# Patient Record
Sex: MALE | Race: WHITE | Employment: UNEMPLOYED | URBAN - NONMETROPOLITAN AREA
[De-identification: names, ages, dates, MRNs, and addresses within clinical notes are randomized per-mention and may not be internally consistent; named-entity substitution may affect disease eponyms.]

---

## 2020-11-24 ENCOUNTER — HOSPITAL ENCOUNTER (EMERGENCY)
Age: 70
Discharge: ANOTHER HOSPITAL | End: 2020-11-24
Attending: EMERGENCY MEDICINE
Payer: COMMERCIAL

## 2020-11-24 ENCOUNTER — APPOINTMENT (OUTPATIENT)
Dept: GENERAL RADIOLOGY | Age: 70
End: 2020-11-24
Payer: COMMERCIAL

## 2020-11-24 VITALS
DIASTOLIC BLOOD PRESSURE: 66 MMHG | HEART RATE: 83 BPM | SYSTOLIC BLOOD PRESSURE: 102 MMHG | WEIGHT: 170 LBS | RESPIRATION RATE: 18 BRPM | TEMPERATURE: 97.7 F | OXYGEN SATURATION: 94 %

## 2020-11-24 LAB
ABO: NORMAL
ALBUMIN SERPL-MCNC: 3.6 G/DL (ref 3.5–5.1)
ALP BLD-CCNC: 51 U/L (ref 38–126)
ALT SERPL-CCNC: 20 U/L (ref 11–66)
ANION GAP SERPL CALCULATED.3IONS-SCNC: 20 MEQ/L (ref 8–16)
ANTIBODY SCREEN: NORMAL
AST SERPL-CCNC: 28 U/L (ref 5–40)
BASOPHILS # BLD: 0.2 %
BASOPHILS ABSOLUTE: 0 THOU/MM3 (ref 0–0.1)
BILIRUB SERPL-MCNC: 0.5 MG/DL (ref 0.3–1.2)
BUN BLDV-MCNC: 120 MG/DL (ref 7–22)
C-REACTIVE PROTEIN: 4.22 MG/DL (ref 0–1)
CALCIUM SERPL-MCNC: 8.9 MG/DL (ref 8.5–10.5)
CHLORIDE BLD-SCNC: 102 MEQ/L (ref 98–111)
CO2: 20 MEQ/L (ref 23–33)
CREAT SERPL-MCNC: 4.9 MG/DL (ref 0.4–1.2)
D-DIMER QUANTITATIVE: 784 NG/ML FEU (ref 0–500)
EKG ATRIAL RATE: 76 BPM
EKG P AXIS: -19 DEGREES
EKG P-R INTERVAL: 134 MS
EKG Q-T INTERVAL: 376 MS
EKG QRS DURATION: 78 MS
EKG QTC CALCULATION (BAZETT): 423 MS
EKG R AXIS: -9 DEGREES
EKG T AXIS: 5 DEGREES
EKG VENTRICULAR RATE: 76 BPM
EOSINOPHIL # BLD: 1.3 %
EOSINOPHILS ABSOLUTE: 0.1 THOU/MM3 (ref 0–0.4)
ERYTHROCYTE [DISTWIDTH] IN BLOOD BY AUTOMATED COUNT: 12.6 % (ref 11.5–14.5)
ERYTHROCYTE [DISTWIDTH] IN BLOOD BY AUTOMATED COUNT: 45.5 FL (ref 35–45)
FERRITIN: 936 NG/ML (ref 22–322)
GFR SERPL CREATININE-BSD FRML MDRD: 12 ML/MIN/1.73M2
GLUCOSE BLD-MCNC: 135 MG/DL (ref 70–108)
HCT VFR BLD CALC: 40.3 % (ref 42–52)
HEMOGLOBIN: 13.2 GM/DL (ref 14–18)
IMMATURE GRANS (ABS): 0.02 THOU/MM3 (ref 0–0.07)
IMMATURE GRANULOCYTES: 0.4 %
LACTIC ACID: 1.2 MMOL/L (ref 0.5–2.2)
LD: 230 U/L (ref 100–190)
LYMPHOCYTES # BLD: 25.5 %
LYMPHOCYTES ABSOLUTE: 1.3 THOU/MM3 (ref 1–4.8)
MCH RBC QN AUTO: 32.2 PG (ref 26–33)
MCHC RBC AUTO-ENTMCNC: 32.8 GM/DL (ref 32.2–35.5)
MCV RBC AUTO: 98.3 FL (ref 80–94)
MONOCYTES # BLD: 4.6 %
MONOCYTES ABSOLUTE: 0.2 THOU/MM3 (ref 0.4–1.3)
NUCLEATED RED BLOOD CELLS: 0 /100 WBC
OSMOLALITY CALCULATION: 323.5 MOSMOL/KG (ref 275–300)
PLATELET # BLD: 182 THOU/MM3 (ref 130–400)
PLATELET ESTIMATE: ADEQUATE
PMV BLD AUTO: 11.2 FL (ref 9.4–12.4)
POTASSIUM REFLEX MAGNESIUM: 4 MEQ/L (ref 3.5–5.2)
PROCALCITONIN: 0.47 NG/ML (ref 0.01–0.09)
RBC # BLD: 4.1 MILL/MM3 (ref 4.7–6.1)
RH FACTOR: NORMAL
SARS-COV-2, NAAT: DETECTED
SCAN OF BLOOD SMEAR: NORMAL
SEG NEUTROPHILS: 68 %
SEGMENTED NEUTROPHILS ABSOLUTE COUNT: 3.5 THOU/MM3 (ref 1.8–7.7)
SODIUM BLD-SCNC: 142 MEQ/L (ref 135–145)
TOTAL PROTEIN: 7.2 G/DL (ref 6.1–8)
TROPONIN T: < 0.01 NG/ML
WBC # BLD: 5.2 THOU/MM3 (ref 4.8–10.8)

## 2020-11-24 PROCEDURE — 85379 FIBRIN DEGRADATION QUANT: CPT

## 2020-11-24 PROCEDURE — 80053 COMPREHEN METABOLIC PANEL: CPT

## 2020-11-24 PROCEDURE — 83615 LACTATE (LD) (LDH) ENZYME: CPT

## 2020-11-24 PROCEDURE — 86140 C-REACTIVE PROTEIN: CPT

## 2020-11-24 PROCEDURE — 71045 X-RAY EXAM CHEST 1 VIEW: CPT

## 2020-11-24 PROCEDURE — 99285 EMERGENCY DEPT VISIT HI MDM: CPT

## 2020-11-24 PROCEDURE — 36415 COLL VENOUS BLD VENIPUNCTURE: CPT

## 2020-11-24 PROCEDURE — 6360000002 HC RX W HCPCS: Performed by: EMERGENCY MEDICINE

## 2020-11-24 PROCEDURE — 85025 COMPLETE CBC W/AUTO DIFF WBC: CPT

## 2020-11-24 PROCEDURE — 83605 ASSAY OF LACTIC ACID: CPT

## 2020-11-24 PROCEDURE — 84145 PROCALCITONIN (PCT): CPT

## 2020-11-24 PROCEDURE — 86900 BLOOD TYPING SEROLOGIC ABO: CPT

## 2020-11-24 PROCEDURE — 93005 ELECTROCARDIOGRAM TRACING: CPT | Performed by: EMERGENCY MEDICINE

## 2020-11-24 PROCEDURE — 84484 ASSAY OF TROPONIN QUANT: CPT

## 2020-11-24 PROCEDURE — U0002 COVID-19 LAB TEST NON-CDC: HCPCS

## 2020-11-24 PROCEDURE — 2580000003 HC RX 258: Performed by: EMERGENCY MEDICINE

## 2020-11-24 PROCEDURE — 86850 RBC ANTIBODY SCREEN: CPT

## 2020-11-24 PROCEDURE — 86901 BLOOD TYPING SEROLOGIC RH(D): CPT

## 2020-11-24 PROCEDURE — 82728 ASSAY OF FERRITIN: CPT

## 2020-11-24 RX ORDER — OMEPRAZOLE 20 MG/1
20 CAPSULE, DELAYED RELEASE ORAL 2 TIMES DAILY
COMMUNITY

## 2020-11-24 RX ORDER — ATENOLOL 100 MG/1
100 TABLET ORAL DAILY
COMMUNITY

## 2020-11-24 RX ORDER — ASPIRIN 81 MG/1
81 TABLET ORAL DAILY
COMMUNITY

## 2020-11-24 RX ORDER — PRAVASTATIN SODIUM 80 MG/1
80 TABLET ORAL NIGHTLY
COMMUNITY

## 2020-11-24 RX ORDER — HYDROCHLOROTHIAZIDE 25 MG/1
25 TABLET ORAL DAILY
COMMUNITY

## 2020-11-24 RX ORDER — AMLODIPINE BESYLATE 10 MG/1
10 TABLET ORAL DAILY
COMMUNITY

## 2020-11-24 RX ORDER — FINASTERIDE 5 MG/1
5 TABLET, FILM COATED ORAL DAILY
COMMUNITY

## 2020-11-24 RX ORDER — LISINOPRIL 20 MG/1
20 TABLET ORAL 2 TIMES DAILY
COMMUNITY

## 2020-11-24 RX ORDER — SODIUM CHLORIDE 9 MG/ML
INJECTION, SOLUTION INTRAVENOUS CONTINUOUS
Status: DISCONTINUED | OUTPATIENT
Start: 2020-11-24 | End: 2020-11-24 | Stop reason: HOSPADM

## 2020-11-24 RX ORDER — AMITRIPTYLINE HYDROCHLORIDE 50 MG/1
50 TABLET, FILM COATED ORAL NIGHTLY
COMMUNITY

## 2020-11-24 RX ORDER — DEXAMETHASONE 4 MG/1
6 TABLET ORAL ONCE
Status: COMPLETED | OUTPATIENT
Start: 2020-11-24 | End: 2020-11-24

## 2020-11-24 RX ORDER — TERAZOSIN 2 MG/1
2 CAPSULE ORAL NIGHTLY
COMMUNITY

## 2020-11-24 RX ADMIN — DEXAMETHASONE 6 MG: 4 TABLET ORAL at 13:20

## 2020-11-24 RX ADMIN — SODIUM CHLORIDE: 9 INJECTION, SOLUTION INTRAVENOUS at 18:30

## 2020-11-24 ASSESSMENT — ENCOUNTER SYMPTOMS
SORE THROAT: 1
RHINORRHEA: 0
CONSTIPATION: 0
ABDOMINAL PAIN: 0
NAUSEA: 0
COUGH: 1
SHORTNESS OF BREATH: 0
DIARRHEA: 0
VOMITING: 0

## 2020-11-24 ASSESSMENT — PAIN DESCRIPTION - LOCATION
LOCATION: BACK;KNEE

## 2020-11-24 ASSESSMENT — PAIN DESCRIPTION - FREQUENCY
FREQUENCY: CONTINUOUS

## 2020-11-24 ASSESSMENT — PAIN DESCRIPTION - ORIENTATION
ORIENTATION: RIGHT;LEFT
ORIENTATION: RIGHT;LEFT
ORIENTATION: LEFT;RIGHT
ORIENTATION: RIGHT;LEFT

## 2020-11-24 ASSESSMENT — PAIN DESCRIPTION - DESCRIPTORS
DESCRIPTORS: ACHING

## 2020-11-24 ASSESSMENT — PAIN SCALES - GENERAL
PAINLEVEL_OUTOF10: 5

## 2020-11-24 ASSESSMENT — PAIN DESCRIPTION - PAIN TYPE
TYPE: CHRONIC PAIN

## 2020-11-24 NOTE — ED NOTES
In for hourly rounding. Pt resting on cot in position of comfort with eyes closed, easily awoken to voice. Pt remains A&Ox4, resps easy and unlabored. Verbal orders for 100ml/hr fluid administration from Dr. Pelon Amos. IV flushed and  shows no s/s of infection or infiltration. Pt pain remains unchanged at this time. Monitor remains in place. Assisted pt on use of urinal. Updated pt on POC. Will monitor.      Issa Fry RN  11/24/20 1640

## 2020-11-24 NOTE — ED PROVIDER NOTES
Jov[facility] Walsh [facility] COMPLAINT       Chief Complaint   Patient presents with    Shortness of Breath    Generalized Body Aches       Nurses Notes reviewed and I agree except as noted in the HPI. HISTORY OF PRESENT ILLNESS    Get Doe is a 69 y.o. male who presents emergency department for reported low O2 level. Patient reports that he was brought [facility] ED [facility]. He states that he has had a sore throat and did have a cough recently. He had some episodes of posttussive emesis but states that his cough is actually better now. He also had some mild headache. No fever, nausea, vomiting, diarrhea. He denies any known exposure to COVID-19. He did have a Covid test performed yesterday but does not yet have the results. Per prearrival notes from EMS, they were called to bring the patient to our facility because his oxygen level was low last night, he was requiring 5 L nasal cannula oxygen and sats were 95%. Patient also reportedly was hypotensive at the transferring facility however his exact vital signs were not provided to us in the emergency department. Currently, patient is resting in the room on room air and sats are 95 to 96%. He denies chest pain, abdominal pain, lower extremity pain or swelling. He does report some generalized malaise and weakness. He also reports some ongoing chronic pain in the knees and lower back consistent with his history of chronic pain due to arthritis; this is at baseline. No other initial complaints or concerns. REVIEW OF SYSTEMS     Review of Systems   Constitutional: Positive for fatigue. Negative for diaphoresis and fever. HENT: Positive for sore throat. Negative for congestion and rhinorrhea. Eyes: Negative for visual disturbance. Respiratory: Positive for cough. Negative for shortness of breath. Cardiovascular: Negative for chest pain, palpitations and leg swelling. Gastrointestinal: Negative for abdominal pain, constipation, diarrhea, nausea and vomiting. Endocrine: Negative for polyuria. Genitourinary: Negative for dysuria. Musculoskeletal: Positive for arthralgias. Negative for joint swelling. Skin: Negative for rash. Neurological: Positive for headaches. Negative for dizziness, seizures, syncope, speech difficulty, weakness and numbness. Hematological: Negative for adenopathy. Psychiatric/Behavioral: Negative for confusion and self-injury. All other systems reviewed and are negative. PAST MEDICAL HISTORY    has no past medical history on file. SURGICAL HISTORY      has no past surgical history on file. CURRENT MEDICATIONS       Previous Medications    AMITRIPTYLINE (ELAVIL) 50 MG TABLET    Take 50 mg by mouth nightly    AMLODIPINE (NORVASC) 10 MG TABLET    Take 10 mg by mouth daily    ASPIRIN 81 MG EC TABLET    Take 81 mg by mouth daily    ATENOLOL (TENORMIN) 100 MG TABLET    Take 100 mg by mouth daily    FINASTERIDE (PROSCAR) 5 MG TABLET    Take 5 mg by mouth daily    HYDROCHLOROTHIAZIDE (HYDRODIURIL) 25 MG TABLET    Take 25 mg by mouth daily    LISINOPRIL (PRINIVIL;ZESTRIL) 20 MG TABLET    Take 20 mg by mouth 2 times daily    OMEPRAZOLE (PRILOSEC) 20 MG DELAYED RELEASE CAPSULE    Take 20 mg by mouth 2 times daily 2BID    PRAVASTATIN (PRAVACHOL) 80 MG TABLET    Take 80 mg by mouth nightly    TERAZOSIN (HYTRIN) 2 MG CAPSULE    Take 2 mg by mouth nightly 4qHS       ALLERGIES     has No Known Allergies. FAMILY HISTORY     has no family status information on file. family history is not on file. SOCIAL HISTORY          PHYSICAL EXAM     INITIAL VITALS:  weight is 170 lb (77.1 kg). His oral temperature is 97.7 °F (36.5 °C). His blood pressure is 102/66 and his pulse is 83. His respiration is 18 and oxygen saturation is 94%. Constitutional: Well-nourished, no distress evident.   HEENT: Normocephalic, normal hearing, EOMI, speech clear.  Neck: Soft supple. Trachea midline. Heart: Regular rate and rhythm on cardiac monitor, no cyanosis, no JVD appreciated  Lungs: Respiratory effort normal; no retractions, no audible wheezing, no signs of air hunger  Abdomen: Nondistended; soft, nontender. Extremities: Well-perfused, movement normal as observed. Neuro: No focal deficits noted. Psych: Normal affect and behavior. DIFFERENTIAL DIAGNOSIS:   covid 19, pneumonia, dehydration, and others    DIAGNOSTIC RESULTS     EKG: All EKG's are interpreted by the Emergency Department Physician who either signs or Co-signsthis chart in the absence of a cardiologist.  EKG shows sinus rhythm at a rate of 76 bpm with occasional PVCs, LA interval 134 ms, QRS duration 78 ms,  ms. No ST elevation or depression, there is fine artifact in the baseline diffusely which can obscure or complicate the breathing. My interpretation. RADIOLOGY: non-plain film images(s) such as CT,Ultrasound and MRI are read by the radiologist.    XR CHEST PORTABLE   Final Result   No acute disease            **This report has been created using voice recognition software. It may contain minor errors which are inherent in voice recognition technology. **      Final report electronically signed by Dr. Caldwell Given on 11/24/2020 11:59 AM        [] Visualized and interpreted by me   [x] Radiologist's Wet Read Report Reviewed   [] Discussed withRadiologist.    LABS:   Labs Reviewed   CBC WITH AUTO DIFFERENTIAL - Abnormal; Notable for the following components:       Result Value    RBC 4.10 (*)     Hemoglobin 13.2 (*)     Hematocrit 40.3 (*)     MCV 98.3 (*)     RDW-SD 45.5 (*)     Monocytes Absolute 0.2 (*)     All other components within normal limits   COMPREHENSIVE METABOLIC PANEL W/ REFLEX TO MG FOR LOW K - Abnormal; Notable for the following components:    Glucose 135 (*)     CREATININE 4.9 (*)      (*)     CO2 20 (*)     All other components within normal limits D-DIMER, QUANTITATIVE - Abnormal; Notable for the following components:    D-Dimer, Quant 784.00 (*)     All other components within normal limits   FERRITIN - Abnormal; Notable for the following components:    Ferritin 936 (*)     All other components within normal limits   LACTATE DEHYDROGENASE - Abnormal; Notable for the following components:     (*)     All other components within normal limits   C-REACTIVE PROTEIN - Abnormal; Notable for the following components:    CRP 4.22 (*)     All other components within normal limits   PROCALCITONIN - Abnormal; Notable for the following components:    Procalcitonin 0.47 (*)     All other components within normal limits   COVID-19 - Abnormal; Notable for the following components:    SARS-CoV-2, NAAT DETECTED (*)     All other components within normal limits   ANION GAP - Abnormal; Notable for the following components:    Anion Gap 20.0 (*)     All other components within normal limits   GLOMERULAR FILTRATION RATE, ESTIMATED - Abnormal; Notable for the following components:    Est, Glom Filt Rate 12 (*)     All other components within normal limits   OSMOLALITY - Abnormal; Notable for the following components:    Osmolality Calc 323.5 (*)     All other components within normal limits   TROPONIN   LACTIC ACID, PLASMA   SCAN OF BLOOD SMEAR   D-DIMER, QUANTITATIVE   TYPE AND SCREEN       EMERGENCY DEPARTMENT COURSE:   Vitals:    Vitals:    11/24/20 1416 11/24/20 1531 11/24/20 1636 11/24/20 1752   BP: 96/61 (!) 103/58 102/68 102/66   Pulse: 72 74 79 83   Resp: 18 18 18 18   Temp:       TempSrc:       SpO2: 94% 92% 92% 94%   Weight:         No prior labs available for comparison, suspect elevation of BUN and creatinine is acute. Covid positive. I contacted [facility] transfer center at approximately 1 PM and spoke with staff there. They are going to call back about bed status in a couple of hours. Decadron 6mg ordered PO.     He was accepted for transfer to [facility] under Dr. Inga Gongora. Patient did urinate here in the emergency department per nursing staff, IV fluid hydration was continued. CRITICAL CARE:   None    CONSULTS:  [facility] transfer center    PROCEDURES:  None    FINAL IMPRESSION      1. COVID-19    2. ROSHAN (acute kidney injury) ([facility] [address]          DISPOSITION/PLAN   Transfer    PATIENT REFERRED TO:  No follow-up provider specified. DISCHARGE MEDICATIONS:  New Prescriptions    No medications on file       (Please note that portions of this note were completed with a voice recognition program.  Efforts were made to edit the dictations but occasionally words are mis-transcribed.)    Provider:  I personally performed the services described in the documentation, reviewed and edited the documentation which was dictated, and it accurately records my words and actions.     Reji Snider MD 11/24/20 7:07 PM                Reji Snider MD  11/24/20 8653

## 2020-11-24 NOTE — ED NOTES
In for hourly rounding. Pt resting on cot in position of comfort. Pt remains A&Ox4, resps easy and unlabored. IV infusion complete and shows no s/s of infection or infiltration. Pt pain remains unchanged at this time. Monitor remains in place. Updated pt on POC. Will monitor. [facility] guards remain at bedside.      Romie Monzon RN  11/24/20 9713

## 2020-11-24 NOTE — ED NOTES
Pt presents to ED via [address] [facility] from [facility] for c/o shortness of breath and possible COVID. [facility] reports pt began complaining of shortness of breath last night and placed pt on 2L O2 via nc. Upon arrival, pt is resting on cot in position of comfort. Pt is A&Ox4, resps easy and unlabored with LS clear and equal bilat. Pt 95-97% on RA. EKG completed upon arrival. IV established PTA, flushed, and infusing with no s/s of infection or infiltration. En route, pt was noted to be hypotensive. Monitor applied and VSS. Dr. Rayray Cali at bedside for assessment. Lab at bedside to draw more blood work and pt swabbed for rapid covid. Will monitor.      Gretel Lundborg, RN  11/24/20 9231

## 2020-11-24 NOTE — ACP (ADVANCE CARE PLANNING)
Advance Care Planning     Advance Care Planning Activator (Inpatient)  Conversation Note      Date of ACP Conversation: 11/24/2020    Conversation Conducted with: Patient with [address] Decision Maker: Court-Appointed Legal Guardian (name) Florencia Weaver    ACP Activator: Doreen Mccormick    \"Who would you like to name as your primary health care decision-maker? \"               Name: Florencia Weaver        Relationship: [employer]          Phone number: [phone]  \"Can this person be reached easily? \" No  \"Who would you like to name as your back-up decision maker? \"   Name: None        Relationship: None          Phone number: None  \"Can this person be reached easily? \" No      Care Preferences    Ventilation: \"If you were in your present state of health and suddenly became very ill and were unable to breathe on your own, what would your preference be about the use of a ventilator (breathing machine) if it were available to you? \"      Would the patient desire the use of ventilator (breathing machine)?: yes    \"If your health worsens and it becomes clear that your chance of recovery is unlikely, what would your preference be about the use of a ventilator (breathing machine) if it were available to you? \"     Would the patient desire the use of ventilator (breathing machine)?: Yes      Resuscitation  \"CPR works best to restart the heart when there is a sudden event, like a heart attack, in someone who is otherwise healthy. Unfortunately, CPR does not typically restart the heart for people who have serious health conditions or who are very sick. \"    \"In the event your heart stopped as a result of an underlying serious health condition, would you want attempts to be made to restart your heart (answer \"yes\" for attempt to resuscitate) or would you prefer a natural death (answer \"no\" for do not attempt to resuscitate)? \" yes    [] Yes   [x] No   Educated Patient / Decision Maker regarding differences between Advance Directives and portable DNR orders. Length of ACP Conversation in minutes:  15  Conversation Outcomes:  [] ACP discussion completed  [x] Existing advance directive reviewed with patient; no changes to patient's previously recorded wishes  [] New Advance Directive completed  [] Portable Do Not Rescitate prepared for Provider review and signature  [] POLST/POST/MOLST/MOST prepared for Provider review and signature      Follow-up plan:    [] Schedule follow-up conversation to continue planning  [] Referred individual to Provider for additional questions/concerns   [] Advised patient/agent/surrogate to review completed ACP document and update if needed with changes in condition, patient preferences or care setting    [] This note routed to one or more involved healthcare providers      Nicole Diamond is an inmate from the local snf. His determination of treatment was determined by the warden. When discussed he stated the patient is and will remain a Full Code at this point in time. No further conversation or follow-up is required.

## 2020-11-24 NOTE — ED NOTES
Bed: 020A  Expected date: 11/24/20  Expected time: 10:57 AM  Means of arrival: [facility]  Comments:     Freddie Ruiz RN  11/24/20 1926

## 2020-11-24 NOTE — ED NOTES
In for hourly rounding. Pt resting on cot in position of comfort with eyes closed, easily awoken to voice. Pt remains A&Ox4, resps easy and unlabored. IV shows no s/s of infection or infiltration. Pt chronic pain remains unchanged at this time. Monitor remains in place. Updated pt on POC. Will monitor.        Ilia Ricks RN  11/24/20 0035

## 2020-11-24 NOTE — ED NOTES
In for hourly rounding. Pt resting on cot in position of comfort. Pt remains A&Ox4, resps easy and unlabored. IV shows no s/s of infection or infiltration. Pt pain remains unchanged at this time. Monitor remains in place. Updated pt on POC. Will monitor.      Sarah Hernandez RN  11/24/20 9406

## 2020-11-25 PROCEDURE — 93010 ELECTROCARDIOGRAM REPORT: CPT | Performed by: INTERNAL MEDICINE

## 2022-09-30 ENCOUNTER — HOSPITAL ENCOUNTER (EMERGENCY)
Age: 72
Discharge: HOME | End: 2022-09-30
Attending: EMERGENCY MEDICINE
Payer: MEDICAID

## 2022-09-30 ENCOUNTER — APPOINTMENT (OUTPATIENT)
Dept: GENERAL RADIOLOGY | Age: 72
End: 2022-09-30
Payer: MEDICAID

## 2022-09-30 VITALS
SYSTOLIC BLOOD PRESSURE: 161 MMHG | HEIGHT: 63 IN | TEMPERATURE: 98.7 F | RESPIRATION RATE: 18 BRPM | DIASTOLIC BLOOD PRESSURE: 83 MMHG | WEIGHT: 179 LBS | HEART RATE: 62 BPM | BODY MASS INDEX: 31.71 KG/M2 | OXYGEN SATURATION: 96 %

## 2022-09-30 DIAGNOSIS — J18.9 PNEUMONIA OF LEFT LOWER LOBE DUE TO INFECTIOUS ORGANISM: Primary | ICD-10-CM

## 2022-09-30 LAB
ALBUMIN SERPL-MCNC: 4.1 G/DL (ref 3.5–5.2)
ALP BLD-CCNC: 55 U/L (ref 40–129)
ALT SERPL-CCNC: 20 U/L (ref 0–40)
ANION GAP SERPL CALCULATED.3IONS-SCNC: 12 MMOL/L (ref 7–16)
APTT: 27.4 SEC (ref 24.5–35.1)
AST SERPL-CCNC: 18 U/L (ref 0–39)
BACTERIA: ABNORMAL /HPF
BASOPHILS ABSOLUTE: 0.04 E9/L (ref 0–0.2)
BASOPHILS RELATIVE PERCENT: 0.6 % (ref 0–2)
BILIRUB SERPL-MCNC: 0.2 MG/DL (ref 0–1.2)
BILIRUBIN URINE: NEGATIVE
BLOOD, URINE: NEGATIVE
BUN BLDV-MCNC: 33 MG/DL (ref 6–23)
CALCIUM SERPL-MCNC: 9.4 MG/DL (ref 8.6–10.2)
CHLORIDE BLD-SCNC: 108 MMOL/L (ref 98–107)
CLARITY: CLEAR
CO2: 24 MMOL/L (ref 22–29)
COLOR: YELLOW
CREAT SERPL-MCNC: 1.4 MG/DL (ref 0.7–1.2)
CRYSTALS, UA: ABNORMAL /HPF
EOSINOPHILS ABSOLUTE: 0.16 E9/L (ref 0.05–0.5)
EOSINOPHILS RELATIVE PERCENT: 2.3 % (ref 0–6)
EPITHELIAL CELLS, UA: ABNORMAL /HPF
GFR AFRICAN AMERICAN: >60
GFR NON-AFRICAN AMERICAN: 50 ML/MIN/1.73
GLUCOSE BLD-MCNC: 117 MG/DL (ref 74–99)
GLUCOSE URINE: NEGATIVE MG/DL
HCT VFR BLD CALC: 41 % (ref 37–54)
HEMOGLOBIN: 13.6 G/DL (ref 12.5–16.5)
IMMATURE GRANULOCYTES #: 0.02 E9/L
IMMATURE GRANULOCYTES %: 0.3 % (ref 0–5)
INR BLD: 1.1
KETONES, URINE: NEGATIVE MG/DL
LACTIC ACID: 1.6 MMOL/L (ref 0.5–2.2)
LEUKOCYTE ESTERASE, URINE: NEGATIVE
LYMPHOCYTES ABSOLUTE: 1.87 E9/L (ref 1.5–4)
LYMPHOCYTES RELATIVE PERCENT: 26.6 % (ref 20–42)
MCH RBC QN AUTO: 30.3 PG (ref 26–35)
MCHC RBC AUTO-ENTMCNC: 33.2 % (ref 32–34.5)
MCV RBC AUTO: 91.3 FL (ref 80–99.9)
MONOCYTES ABSOLUTE: 0.57 E9/L (ref 0.1–0.95)
MONOCYTES RELATIVE PERCENT: 8.1 % (ref 2–12)
NEUTROPHILS ABSOLUTE: 4.37 E9/L (ref 1.8–7.3)
NEUTROPHILS RELATIVE PERCENT: 62.1 % (ref 43–80)
NITRITE, URINE: NEGATIVE
PDW BLD-RTO: 13.6 FL (ref 11.5–15)
PH UA: 6 (ref 5–9)
PLATELET # BLD: 216 E9/L (ref 130–450)
PMV BLD AUTO: 10.9 FL (ref 7–12)
POTASSIUM REFLEX MAGNESIUM: 4 MMOL/L (ref 3.5–5)
PRO-BNP: 917 PG/ML (ref 0–125)
PROTEIN UA: NEGATIVE MG/DL
PROTHROMBIN TIME: 11.9 SEC (ref 9.3–12.4)
RBC # BLD: 4.49 E12/L (ref 3.8–5.8)
RBC UA: ABNORMAL /HPF (ref 0–2)
SARS-COV-2, NAAT: NOT DETECTED
SODIUM BLD-SCNC: 144 MMOL/L (ref 132–146)
SPECIFIC GRAVITY UA: >=1.03 (ref 1–1.03)
TOTAL PROTEIN: 7.5 G/DL (ref 6.4–8.3)
TROPONIN, HIGH SENSITIVITY: 11 NG/L (ref 0–11)
TROPONIN, HIGH SENSITIVITY: 12 NG/L (ref 0–11)
UROBILINOGEN, URINE: 0.2 E.U./DL
WBC # BLD: 7 E9/L (ref 4.5–11.5)
WBC UA: ABNORMAL /HPF (ref 0–5)

## 2022-09-30 PROCEDURE — 85025 COMPLETE CBC W/AUTO DIFF WBC: CPT

## 2022-09-30 PROCEDURE — 36415 COLL VENOUS BLD VENIPUNCTURE: CPT

## 2022-09-30 PROCEDURE — 87635 SARS-COV-2 COVID-19 AMP PRB: CPT

## 2022-09-30 PROCEDURE — 80053 COMPREHEN METABOLIC PANEL: CPT

## 2022-09-30 PROCEDURE — 85610 PROTHROMBIN TIME: CPT

## 2022-09-30 PROCEDURE — 71045 X-RAY EXAM CHEST 1 VIEW: CPT

## 2022-09-30 PROCEDURE — 99285 EMERGENCY DEPT VISIT HI MDM: CPT

## 2022-09-30 PROCEDURE — 81001 URINALYSIS AUTO W/SCOPE: CPT

## 2022-09-30 PROCEDURE — 93005 ELECTROCARDIOGRAM TRACING: CPT

## 2022-09-30 PROCEDURE — 84484 ASSAY OF TROPONIN QUANT: CPT

## 2022-09-30 PROCEDURE — 85730 THROMBOPLASTIN TIME PARTIAL: CPT

## 2022-09-30 PROCEDURE — 83605 ASSAY OF LACTIC ACID: CPT

## 2022-09-30 PROCEDURE — 6370000000 HC RX 637 (ALT 250 FOR IP): Performed by: EMERGENCY MEDICINE

## 2022-09-30 PROCEDURE — 83880 ASSAY OF NATRIURETIC PEPTIDE: CPT

## 2022-09-30 RX ORDER — CEFDINIR 300 MG/1
300 CAPSULE ORAL ONCE
Status: COMPLETED | OUTPATIENT
Start: 2022-09-30 | End: 2022-09-30

## 2022-09-30 RX ORDER — DOXYCYCLINE HYCLATE 100 MG/1
100 CAPSULE ORAL ONCE
Status: COMPLETED | OUTPATIENT
Start: 2022-09-30 | End: 2022-09-30

## 2022-09-30 RX ORDER — CEFDINIR 300 MG/1
300 CAPSULE ORAL 2 TIMES DAILY
Qty: 20 CAPSULE | Refills: 0 | Status: ON HOLD | OUTPATIENT
Start: 2022-09-30 | End: 2022-10-14 | Stop reason: HOSPADM

## 2022-09-30 RX ORDER — DOXYCYCLINE HYCLATE 100 MG
100 TABLET ORAL 2 TIMES DAILY
Qty: 20 TABLET | Refills: 0 | Status: ON HOLD | OUTPATIENT
Start: 2022-09-30 | End: 2022-10-14 | Stop reason: HOSPADM

## 2022-09-30 RX ADMIN — CEFDINIR 300 MG: 300 CAPSULE ORAL at 18:27

## 2022-09-30 RX ADMIN — DOXYCYCLINE 100 MG: 100 CAPSULE ORAL at 18:28

## 2022-09-30 ASSESSMENT — PAIN DESCRIPTION - LOCATION: LOCATION: ABDOMEN

## 2022-09-30 ASSESSMENT — PAIN SCALES - GENERAL: PAINLEVEL_OUTOF10: 5

## 2022-09-30 ASSESSMENT — PAIN - FUNCTIONAL ASSESSMENT
PAIN_FUNCTIONAL_ASSESSMENT: 0-10
PAIN_FUNCTIONAL_ASSESSMENT: NONE - DENIES PAIN

## 2022-09-30 ASSESSMENT — PAIN DESCRIPTION - ORIENTATION: ORIENTATION: LEFT

## 2022-09-30 NOTE — ED PROVIDER NOTES
HPI:  9/30/22,   Time: 3:18 PM EDT       Barbara Massey is a 66 y.o. male presenting to the ED for irr heart beat, beginning unk ago. The complaint has been persistent, mild in severity, and worsened by nothing. Pt states weak and fatigued for past week. In rehab facility. Chronic nausea from esophageal problems. No palpitations. No cp/sob/v/d/congestion/fever/chills/sweats. Bib ems, pos cough    Review of Systems:   Pertinent positives and negatives are stated within HPI, all other systems reviewed and are negative.          --------------------------------------------- PAST HISTORY ---------------------------------------------  Past Medical History:  has a past medical history of Arthritis and Prostate disorder. Past Surgical History:  has no past surgical history on file. Social History:  reports that he has never smoked. He has never used smokeless tobacco. He reports that he does not currently use alcohol. He reports that he does not use drugs. Family History: family history is not on file. The patients home medications have been reviewed. Allergies: Patient has no known allergies. ---------------------------------------------------PHYSICAL EXAM--------------------------------------    Constitutional/General: Alert and oriented x3, well appearing, non toxic in NAD  Head: Normocephalic and atraumatic  Eyes: PERRL, EOMI, conjunctive normal, sclera non icteric  Mouth: Oropharynx clear, handling secretions, no trismus, no asymmetry of the posterior oropharynx or uvular edema  Neck: Supple, full ROM,   Respiratory: Lungs clear to auscultation bilaterally, no wheezes, rales, or rhonchi. Not in respiratory distress  Cardiovascular:  Regular rate. Regular rhythm. Occ pvcsNo murmurs, gallops, or rubs. 2+ distal pulses  Chest: No chest wall tenderness  GI:  Abdomen Soft, Non tender, Non distended. +BS. No organomegaly, no palpable masses,  No rebound, guarding, or rigidity. Musculoskeletal: Moves all extremities x 4. Warm and well perfused, no clubbing, cyanosis, or edema. Capillary refill <3 seconds  Integument: skin warm and dry. No rashes. Lymphatic: no lymphadenopathy noted  Neurologic: GCS 15, no focal deficits, symmetric strength 5/5 in the upper and lower extremities bilaterally  Psychiatric: Normal Affect    -------------------------------------------------- RESULTS -------------------------------------------------  I have personally reviewed all laboratory and imaging results for this patient. Results are listed below.      LABS:  Results for orders placed or performed during the hospital encounter of 09/30/22   COVID-19, Rapid    Specimen: Nasopharyngeal Swab   Result Value Ref Range    SARS-CoV-2, NAAT Not Detected Not Detected   CBC with Auto Differential   Result Value Ref Range    WBC 7.0 4.5 - 11.5 E9/L    RBC 4.49 3.80 - 5.80 E12/L    Hemoglobin 13.6 12.5 - 16.5 g/dL    Hematocrit 41.0 37.0 - 54.0 %    MCV 91.3 80.0 - 99.9 fL    MCH 30.3 26.0 - 35.0 pg    MCHC 33.2 32.0 - 34.5 %    RDW 13.6 11.5 - 15.0 fL    Platelets 001 519 - 329 E9/L    MPV 10.9 7.0 - 12.0 fL    Neutrophils % 62.1 43.0 - 80.0 %    Immature Granulocytes % 0.3 0.0 - 5.0 %    Lymphocytes % 26.6 20.0 - 42.0 %    Monocytes % 8.1 2.0 - 12.0 %    Eosinophils % 2.3 0.0 - 6.0 %    Basophils % 0.6 0.0 - 2.0 %    Neutrophils Absolute 4.37 1.80 - 7.30 E9/L    Immature Granulocytes # 0.02 E9/L    Lymphocytes Absolute 1.87 1.50 - 4.00 E9/L    Monocytes Absolute 0.57 0.10 - 0.95 E9/L    Eosinophils Absolute 0.16 0.05 - 0.50 E9/L    Basophils Absolute 0.04 0.00 - 0.20 E9/L   Comprehensive Metabolic Panel w/ Reflex to MG   Result Value Ref Range    Sodium 144 132 - 146 mmol/L    Potassium reflex Magnesium 4.0 3.5 - 5.0 mmol/L    Chloride 108 (H) 98 - 107 mmol/L    CO2 24 22 - 29 mmol/L    Anion Gap 12 7 - 16 mmol/L    Glucose 117 (H) 74 - 99 mg/dL    BUN 33 (H) 6 - 23 mg/dL    Creatinine 1.4 (H) 0.7 - 1.2 mg/dL GFR Non-African American 50 >=60 mL/min/1.73    GFR African American >60     Calcium 9.4 8.6 - 10.2 mg/dL    Total Protein 7.5 6.4 - 8.3 g/dL    Albumin 4.1 3.5 - 5.2 g/dL    Total Bilirubin 0.2 0.0 - 1.2 mg/dL    Alkaline Phosphatase 55 40 - 129 U/L    ALT 20 0 - 40 U/L    AST 18 0 - 39 U/L   Troponin   Result Value Ref Range    Troponin, High Sensitivity 11 0 - 11 ng/L   Protime-INR   Result Value Ref Range    Protime 11.9 9.3 - 12.4 sec    INR 1.1    APTT   Result Value Ref Range    aPTT 27.4 24.5 - 35.1 sec   Brain Natriuretic Peptide   Result Value Ref Range    Pro- (H) 0 - 125 pg/mL   Urinalysis with Microscopic   Result Value Ref Range    Color, UA Yellow Straw/Yellow    Clarity, UA Clear Clear    Glucose, Ur Negative Negative mg/dL    Bilirubin Urine Negative Negative    Ketones, Urine Negative Negative mg/dL    Specific Gravity, UA >=1.030 1.005 - 1.030    Blood, Urine Negative Negative    pH, UA 6.0 5.0 - 9.0    Protein, UA Negative Negative mg/dL    Urobilinogen, Urine 0.2 <2.0 E.U./dL    Nitrite, Urine Negative Negative    Leukocyte Esterase, Urine Negative Negative    WBC, UA NONE 0 - 5 /HPF    RBC, UA NONE 0 - 2 /HPF    Epithelial Cells, UA RARE /HPF    Bacteria, UA RARE (A) None Seen /HPF    Crystals, UA Rare (A) None Seen /HPF   Lactic Acid   Result Value Ref Range    Lactic Acid 1.6 0.5 - 2.2 mmol/L   Troponin   Result Value Ref Range    Troponin, High Sensitivity 12 (H) 0 - 11 ng/L       RADIOLOGY:  Interpreted by Radiologist.  XR CHEST PORTABLE   Final Result   Mild left basilar atelectasis and or infiltrate. EKG:  This EKG is signed and interpreted by the EP. Time: 1516  Rate: 90  Rhythm: Sinus w/ frequent pvcs  Interpretation: non-specific EKG  Comparison: None      ------------------------- NURSING NOTES AND VITALS REVIEWED ---------------------------   The nursing notes within the ED encounter and vital signs as below have been reviewed by myself.   BP (!) 161/83   Pulse 62   Temp 98.7 °F (37.1 °C) (Oral)   Resp 18   Ht 5' 3\" (1.6 m)   Wt 179 lb (81.2 kg)   SpO2 96%   BMI 31.71 kg/m²   Oxygen Saturation Interpretation: Normal    The patients available past medical records and past encounters were reviewed. ------------------------------ ED COURSE/MEDICAL DECISION MAKING----------------------  Medications   cefdinir (OMNICEF) capsule 300 mg (300 mg Oral Given 9/30/22 1827)   doxycycline hyclate (VIBRAMYCIN) capsule 100 mg (100 mg Oral Given 9/30/22 1828)         ED COURSE:       Medical Decision Making:    Vss, no hypoxia, pt initially denied cough, then stated did. Pna on xr, feel pt safe for outpt abx      This patient's ED course included: a personal history and physicial examination    This patient has remained hemodynamically stable during their ED course. Re-Evaluations:             Re-evaluation. Patients symptoms show no change    Re-examination  9/30/22   6PM EDT          Vital Signs:         Counseling: The emergency provider has spoken with the patient and discussed todays results, in addition to providing specific details for the plan of care and counseling regarding the diagnosis and prognosis. Questions are answered at this time and they are agreeable with the plan.       --------------------------------- IMPRESSION AND DISPOSITION ---------------------------------    IMPRESSION  1. Pneumonia of left lower lobe due to infectious organism        DISPOSITION  Disposition: Discharge to nursing home  Patient condition is stable    NOTE: This report was transcribed using voice recognition software.  Every effort was made to ensure accuracy; however, inadvertent computerized transcription errors may be present        Charmaine Fournier MD  09/30/22 2054

## 2022-09-30 NOTE — ED NOTES
Called [phone] for facility on [facility]"" pt states he is staying at, was told supervisor will call back for pt transportation     Dominique [facility]aleyCro[facility]  09/30/22 3616

## 2022-10-04 LAB
EKG ATRIAL RATE: 87 BPM
EKG P AXIS: 35 DEGREES
EKG P-R INTERVAL: 132 MS
EKG Q-T INTERVAL: 388 MS
EKG QRS DURATION: 70 MS
EKG QTC CALCULATION (BAZETT): 466 MS
EKG R AXIS: -2 DEGREES
EKG T AXIS: 24 DEGREES
EKG VENTRICULAR RATE: 87 BPM

## 2022-10-06 ENCOUNTER — HOSPITAL ENCOUNTER (INPATIENT)
Age: 72
LOS: 8 days | Discharge: HOME | DRG: 254 | End: 2022-10-14
Attending: EMERGENCY MEDICINE | Admitting: FAMILY MEDICINE
Payer: MEDICAID

## 2022-10-06 ENCOUNTER — APPOINTMENT (OUTPATIENT)
Dept: GENERAL RADIOLOGY | Age: 72
DRG: 254 | End: 2022-10-06
Payer: MEDICAID

## 2022-10-06 ENCOUNTER — APPOINTMENT (OUTPATIENT)
Dept: CT IMAGING | Age: 72
DRG: 254 | End: 2022-10-06
Payer: MEDICAID

## 2022-10-06 DIAGNOSIS — J18.9 PNEUMONIA OF LEFT LOWER LOBE DUE TO INFECTIOUS ORGANISM: Primary | ICD-10-CM

## 2022-10-06 LAB
ALBUMIN SERPL-MCNC: 4.1 G/DL (ref 3.5–5.2)
ALP BLD-CCNC: 61 U/L (ref 40–129)
ALT SERPL-CCNC: 18 U/L (ref 0–40)
AMPHETAMINE SCREEN, URINE: NOT DETECTED
ANION GAP SERPL CALCULATED.3IONS-SCNC: 12 MMOL/L (ref 7–16)
AST SERPL-CCNC: 17 U/L (ref 0–39)
BACTERIA: ABNORMAL /HPF
BARBITURATE SCREEN URINE: NOT DETECTED
BASOPHILS ABSOLUTE: 0.04 E9/L (ref 0–0.2)
BASOPHILS RELATIVE PERCENT: 0.6 % (ref 0–2)
BENZODIAZEPINE SCREEN, URINE: NOT DETECTED
BILIRUB SERPL-MCNC: 0.6 MG/DL (ref 0–1.2)
BILIRUBIN URINE: NEGATIVE
BLOOD, URINE: NEGATIVE
BUN BLDV-MCNC: 20 MG/DL (ref 6–23)
CALCIUM SERPL-MCNC: 9.5 MG/DL (ref 8.6–10.2)
CANNABINOID SCREEN URINE: NOT DETECTED
CHLORIDE BLD-SCNC: 105 MMOL/L (ref 98–107)
CLARITY: CLEAR
CO2: 24 MMOL/L (ref 22–29)
COCAINE METABOLITE SCREEN URINE: NOT DETECTED
COLOR: YELLOW
CREAT SERPL-MCNC: 1.4 MG/DL (ref 0.7–1.2)
EKG ATRIAL RATE: 85 BPM
EKG P AXIS: 29 DEGREES
EKG P-R INTERVAL: 148 MS
EKG Q-T INTERVAL: 390 MS
EKG QRS DURATION: 80 MS
EKG QTC CALCULATION (BAZETT): 464 MS
EKG R AXIS: 1 DEGREES
EKG T AXIS: 7 DEGREES
EKG VENTRICULAR RATE: 85 BPM
EOSINOPHILS ABSOLUTE: 0.22 E9/L (ref 0.05–0.5)
EOSINOPHILS RELATIVE PERCENT: 3.5 % (ref 0–6)
FENTANYL SCREEN, URINE: NOT DETECTED
GFR AFRICAN AMERICAN: >60
GFR NON-AFRICAN AMERICAN: 50 ML/MIN/1.73
GLUCOSE BLD-MCNC: 108 MG/DL (ref 74–99)
GLUCOSE URINE: NEGATIVE MG/DL
HCT VFR BLD CALC: 46.2 % (ref 37–54)
HEMOGLOBIN: 15.1 G/DL (ref 12.5–16.5)
IMMATURE GRANULOCYTES #: 0.02 E9/L
IMMATURE GRANULOCYTES %: 0.3 % (ref 0–5)
KETONES, URINE: NEGATIVE MG/DL
LACTIC ACID: 1.6 MMOL/L (ref 0.5–2.2)
LEUKOCYTE ESTERASE, URINE: NEGATIVE
LYMPHOCYTES ABSOLUTE: 1.68 E9/L (ref 1.5–4)
LYMPHOCYTES RELATIVE PERCENT: 26.4 % (ref 20–42)
Lab: NORMAL
MCH RBC QN AUTO: 30.3 PG (ref 26–35)
MCHC RBC AUTO-ENTMCNC: 32.7 % (ref 32–34.5)
MCV RBC AUTO: 92.8 FL (ref 80–99.9)
METHADONE SCREEN, URINE: NOT DETECTED
MONOCYTES ABSOLUTE: 0.62 E9/L (ref 0.1–0.95)
MONOCYTES RELATIVE PERCENT: 9.7 % (ref 2–12)
NEUTROPHILS ABSOLUTE: 3.79 E9/L (ref 1.8–7.3)
NEUTROPHILS RELATIVE PERCENT: 59.5 % (ref 43–80)
NITRITE, URINE: NEGATIVE
OPIATE SCREEN URINE: NOT DETECTED
OXYCODONE URINE: NOT DETECTED
PDW BLD-RTO: 13.8 FL (ref 11.5–15)
PH UA: 6 (ref 5–9)
PHENCYCLIDINE SCREEN URINE: NOT DETECTED
PLATELET # BLD: 235 E9/L (ref 130–450)
PMV BLD AUTO: 10.5 FL (ref 7–12)
POTASSIUM SERPL-SCNC: 3.9 MMOL/L (ref 3.5–5)
PRO-BNP: 810 PG/ML (ref 0–125)
PROTEIN UA: 30 MG/DL
RBC # BLD: 4.98 E12/L (ref 3.8–5.8)
RBC UA: ABNORMAL /HPF (ref 0–2)
SODIUM BLD-SCNC: 141 MMOL/L (ref 132–146)
SPECIFIC GRAVITY UA: >=1.03 (ref 1–1.03)
TOTAL PROTEIN: 7.5 G/DL (ref 6.4–8.3)
TROPONIN, HIGH SENSITIVITY: 13 NG/L (ref 0–11)
TSH SERPL DL<=0.05 MIU/L-ACNC: 1.49 UIU/ML (ref 0.27–4.2)
UROBILINOGEN, URINE: 0.2 E.U./DL
WBC # BLD: 6.4 E9/L (ref 4.5–11.5)
WBC UA: ABNORMAL /HPF (ref 0–5)

## 2022-10-06 PROCEDURE — 84484 ASSAY OF TROPONIN QUANT: CPT

## 2022-10-06 PROCEDURE — 71045 X-RAY EXAM CHEST 1 VIEW: CPT

## 2022-10-06 PROCEDURE — 83605 ASSAY OF LACTIC ACID: CPT

## 2022-10-06 PROCEDURE — 6360000004 HC RX CONTRAST MEDICATION: Performed by: RADIOLOGY

## 2022-10-06 PROCEDURE — 80053 COMPREHEN METABOLIC PANEL: CPT

## 2022-10-06 PROCEDURE — 99285 EMERGENCY DEPT VISIT HI MDM: CPT

## 2022-10-06 PROCEDURE — 96375 TX/PRO/DX INJ NEW DRUG ADDON: CPT

## 2022-10-06 PROCEDURE — 36415 COLL VENOUS BLD VENIPUNCTURE: CPT

## 2022-10-06 PROCEDURE — 2500000003 HC RX 250 WO HCPCS: Performed by: EMERGENCY MEDICINE

## 2022-10-06 PROCEDURE — 71275 CT ANGIOGRAPHY CHEST: CPT

## 2022-10-06 PROCEDURE — 6360000002 HC RX W HCPCS: Performed by: EMERGENCY MEDICINE

## 2022-10-06 PROCEDURE — 84443 ASSAY THYROID STIM HORMONE: CPT

## 2022-10-06 PROCEDURE — 80307 DRUG TEST PRSMV CHEM ANLYZR: CPT

## 2022-10-06 PROCEDURE — 81001 URINALYSIS AUTO W/SCOPE: CPT

## 2022-10-06 PROCEDURE — 85025 COMPLETE CBC W/AUTO DIFF WBC: CPT

## 2022-10-06 PROCEDURE — 6370000000 HC RX 637 (ALT 250 FOR IP): Performed by: FAMILY MEDICINE

## 2022-10-06 PROCEDURE — 96365 THER/PROPH/DIAG IV INF INIT: CPT

## 2022-10-06 PROCEDURE — 93005 ELECTROCARDIOGRAM TRACING: CPT | Performed by: PHYSICIAN ASSISTANT

## 2022-10-06 PROCEDURE — 1200000000 HC SEMI PRIVATE

## 2022-10-06 PROCEDURE — 87040 BLOOD CULTURE FOR BACTERIA: CPT

## 2022-10-06 PROCEDURE — 2580000003 HC RX 258: Performed by: EMERGENCY MEDICINE

## 2022-10-06 PROCEDURE — 6370000000 HC RX 637 (ALT 250 FOR IP): Performed by: EMERGENCY MEDICINE

## 2022-10-06 PROCEDURE — 83880 ASSAY OF NATRIURETIC PEPTIDE: CPT

## 2022-10-06 RX ORDER — SODIUM CHLORIDE 9 MG/ML
INJECTION, SOLUTION INTRAVENOUS PRN
Status: DISCONTINUED | OUTPATIENT
Start: 2022-10-06 | End: 2022-10-14 | Stop reason: HOSPADM

## 2022-10-06 RX ORDER — ACETAMINOPHEN 325 MG/1
650 TABLET ORAL EVERY 6 HOURS PRN
Status: DISCONTINUED | OUTPATIENT
Start: 2022-10-06 | End: 2022-10-14 | Stop reason: HOSPADM

## 2022-10-06 RX ORDER — ASPIRIN 81 MG/1
324 TABLET, CHEWABLE ORAL ONCE
Status: COMPLETED | OUTPATIENT
Start: 2022-10-06 | End: 2022-10-06

## 2022-10-06 RX ORDER — SODIUM CHLORIDE 0.9 % (FLUSH) 0.9 %
10 SYRINGE (ML) INJECTION EVERY 12 HOURS SCHEDULED
Status: DISCONTINUED | OUTPATIENT
Start: 2022-10-06 | End: 2022-10-14 | Stop reason: HOSPADM

## 2022-10-06 RX ORDER — LOSARTAN POTASSIUM 25 MG/1
25 TABLET ORAL DAILY
Status: DISCONTINUED | OUTPATIENT
Start: 2022-10-07 | End: 2022-10-14 | Stop reason: HOSPADM

## 2022-10-06 RX ORDER — POLYETHYLENE GLYCOL 3350 17 G/17G
17 POWDER, FOR SOLUTION ORAL DAILY PRN
Status: DISCONTINUED | OUTPATIENT
Start: 2022-10-06 | End: 2022-10-14 | Stop reason: HOSPADM

## 2022-10-06 RX ORDER — SODIUM CHLORIDE 0.9 % (FLUSH) 0.9 %
10 SYRINGE (ML) INJECTION PRN
Status: DISCONTINUED | OUTPATIENT
Start: 2022-10-06 | End: 2022-10-14 | Stop reason: HOSPADM

## 2022-10-06 RX ORDER — PROMETHAZINE HYDROCHLORIDE 12.5 MG/1
12.5 TABLET ORAL EVERY 6 HOURS PRN
Status: DISCONTINUED | OUTPATIENT
Start: 2022-10-06 | End: 2022-10-14 | Stop reason: HOSPADM

## 2022-10-06 RX ORDER — ENOXAPARIN SODIUM 100 MG/ML
40 INJECTION SUBCUTANEOUS DAILY
Status: DISCONTINUED | OUTPATIENT
Start: 2022-10-07 | End: 2022-10-14 | Stop reason: HOSPADM

## 2022-10-06 RX ORDER — ACETAMINOPHEN 650 MG/1
650 SUPPOSITORY RECTAL EVERY 6 HOURS PRN
Status: DISCONTINUED | OUTPATIENT
Start: 2022-10-06 | End: 2022-10-14 | Stop reason: HOSPADM

## 2022-10-06 RX ORDER — ONDANSETRON 2 MG/ML
4 INJECTION INTRAMUSCULAR; INTRAVENOUS EVERY 6 HOURS PRN
Status: DISCONTINUED | OUTPATIENT
Start: 2022-10-06 | End: 2022-10-14 | Stop reason: HOSPADM

## 2022-10-06 RX ORDER — HYDRALAZINE HYDROCHLORIDE 20 MG/ML
10 INJECTION INTRAMUSCULAR; INTRAVENOUS EVERY 4 HOURS PRN
Status: DISCONTINUED | OUTPATIENT
Start: 2022-10-06 | End: 2022-10-14 | Stop reason: HOSPADM

## 2022-10-06 RX ADMIN — CEFTRIAXONE 1000 MG: 1 INJECTION, POWDER, FOR SOLUTION INTRAMUSCULAR; INTRAVENOUS at 14:30

## 2022-10-06 RX ADMIN — NITROGLYCERIN 1 INCH: 20 OINTMENT TOPICAL at 12:01

## 2022-10-06 RX ADMIN — IOPAMIDOL 75 ML: 755 INJECTION, SOLUTION INTRAVENOUS at 17:24

## 2022-10-06 RX ADMIN — ACETAMINOPHEN 650 MG: 325 TABLET ORAL at 21:10

## 2022-10-06 RX ADMIN — ASPIRIN 81 MG 324 MG: 81 TABLET ORAL at 12:00

## 2022-10-06 RX ADMIN — DOXYCYCLINE 100 MG: 100 INJECTION, POWDER, LYOPHILIZED, FOR SOLUTION INTRAVENOUS at 14:29

## 2022-10-06 ASSESSMENT — PAIN - FUNCTIONAL ASSESSMENT: PAIN_FUNCTIONAL_ASSESSMENT: 0-10

## 2022-10-06 ASSESSMENT — PAIN SCALES - GENERAL
PAINLEVEL_OUTOF10: 6
PAINLEVEL_OUTOF10: 5

## 2022-10-06 ASSESSMENT — PAIN DESCRIPTION - ORIENTATION: ORIENTATION: INNER

## 2022-10-06 ASSESSMENT — PAIN DESCRIPTION - DESCRIPTORS: DESCRIPTORS: ACHING;NAGGING;SHARP

## 2022-10-06 ASSESSMENT — PAIN DESCRIPTION - LOCATION: LOCATION: CHEST

## 2022-10-06 NOTE — ED NOTES
Department of Emergency Medicine  FIRST PROVIDER TRIAGE NOTE             Independent MLP           10/6/22  9:33 AM EDT    Date of Encounter: 10/6/22   MRN: [id number]      HPI: Emily Edwards is a 66 y.o. male who presents to the ED for Fatigue (Pt has list of complaints, recently treated for pneumonia, here from [address] [place] [address] states he is out of his medications and  hasnt gotten them yet. Pt complaining of fatigue, arm pain into left chest, dry throat etc. )     Is a 66year-old that is presenting with generalized fatigue. Patient recently had pneumonia and was treated and was sent to [facility] without his medications and has never received them. Patient states he is feeling more fatigued and a new onset of worsening chest pain. Patient states is in his arm into his left chest within his dry throat as well. Patient came for further evaluation    ROS: Negative for abd pain, back pain, fever, vomiting, or diarrhea. PE: Gen Appearance/Constitutional: alert  HEENT: NC/NT. PERRLA,  Airway patent. Neck: supple     Initial Plan of Care: All treatment areas with department are currently occupied. Plan to order/Initiate the following while awaiting opening in ED: labs, EKG, and imaging studies.   Initiate Treatment-Testing, Proceed toTreatment Area When Bed Available for ED Attending/MLP to Continue Care    Electronically signed by [place] Reyes PA-C   DD: 10/6/22       Mt Reyes PA-C  10/06/22 7768

## 2022-10-06 NOTE — ED PROVIDER NOTES
HPI:  10/6/22,   Time: 11:40 AM EDT       Verna Kitchen is a 66 y.o. male presenting to the ED for cp/sob/fatigue, beginning days ago. The complaint has been persistent, moderate in severity, and worsened by nothing. Recent visit for pna, feels worse since going home. No n/v/d/abd pain. Cp into left arm with sob. Cont cough. No fever/chills/sweats. Nothing makes better. Review of Systems:   Pertinent positives and negatives are stated within HPI, all other systems reviewed and are negative.          --------------------------------------------- PAST HISTORY ---------------------------------------------  Past Medical History:  has a past medical history of Arthritis and Prostate disorder. Past Surgical History:  has a past surgical history that includes Esophagus dilation. Social History:  reports that he has never smoked. He has never used smokeless tobacco. He reports that he does not currently use alcohol. He reports that he does not use drugs. Family History: family history is not on file. The patients home medications have been reviewed. Allergies: Patient has no known allergies. ---------------------------------------------------PHYSICAL EXAM--------------------------------------    Constitutional/General: Alert and oriented x3, well appearing, non toxic in NAD  Head: Normocephalic and atraumatic  Eyes: PERRL, EOMI, conjunctive normal, sclera non icteric  Mouth: Oropharynx clear, handling secretions, no trismus, no asymmetry of the posterior oropharynx or uvular edema  Neck: Supple, full ROM, non tender to palpation in the midline, no stridor, no crepitus, no meningeal signs  Respiratory: Lungs clear to auscultation bilaterally, no wheezes, rales, or rhonchi. Not in respiratory distress  Cardiovascular:  Regular rate. Regular rhythm. No murmurs, gallops, or rubs. 2+ distal pulses  Chest: No chest wall tenderness  GI:  Abdomen Soft, Non tender, Non distended. +BS.  No organomegaly, no palpable masses,  No rebound, guarding, or rigidity. Musculoskeletal: Moves all extremities x 4. Warm and well perfused, no clubbing, cyanosis, or edema. Capillary refill <3 seconds  Integument: skin warm and dry. No rashes. Lymphatic: no lymphadenopathy noted  Neurologic: GCS 15, no focal deficits, symmetric strength 5/5 in the upper and lower extremities bilaterally  Psychiatric: Normal Affect    -------------------------------------------------- RESULTS -------------------------------------------------  I have personally reviewed all laboratory and imaging results for this patient. Results are listed below.      LABS:  Results for orders placed or performed during the hospital encounter of 10/06/22   CBC with Auto Differential   Result Value Ref Range    WBC 6.4 4.5 - 11.5 E9/L    RBC 4.98 3.80 - 5.80 E12/L    Hemoglobin 15.1 12.5 - 16.5 g/dL    Hematocrit 46.2 37.0 - 54.0 %    MCV 92.8 80.0 - 99.9 fL    MCH 30.3 26.0 - 35.0 pg    MCHC 32.7 32.0 - 34.5 %    RDW 13.8 11.5 - 15.0 fL    Platelets 935 794 - 735 E9/L    MPV 10.5 7.0 - 12.0 fL    Neutrophils % 59.5 43.0 - 80.0 %    Immature Granulocytes % 0.3 0.0 - 5.0 %    Lymphocytes % 26.4 20.0 - 42.0 %    Monocytes % 9.7 2.0 - 12.0 %    Eosinophils % 3.5 0.0 - 6.0 %    Basophils % 0.6 0.0 - 2.0 %    Neutrophils Absolute 3.79 1.80 - 7.30 E9/L    Immature Granulocytes # 0.02 E9/L    Lymphocytes Absolute 1.68 1.50 - 4.00 E9/L    Monocytes Absolute 0.62 0.10 - 0.95 E9/L    Eosinophils Absolute 0.22 0.05 - 0.50 E9/L    Basophils Absolute 0.04 0.00 - 0.20 E9/L   Comprehensive Metabolic Panel   Result Value Ref Range    Sodium 141 132 - 146 mmol/L    Potassium 3.9 3.5 - 5.0 mmol/L    Chloride 105 98 - 107 mmol/L    CO2 24 22 - 29 mmol/L    Anion Gap 12 7 - 16 mmol/L    Glucose 108 (H) 74 - 99 mg/dL    BUN 20 6 - 23 mg/dL    Creatinine 1.4 (H) 0.7 - 1.2 mg/dL    GFR Non-African American 50 >=60 mL/min/1.73    GFR African American >60     Calcium 9.5 8.6 - 10.2 mg/dL    Total Protein 7.5 6.4 - 8.3 g/dL    Albumin 4.1 3.5 - 5.2 g/dL    Total Bilirubin 0.6 0.0 - 1.2 mg/dL    Alkaline Phosphatase 61 40 - 129 U/L    ALT 18 0 - 40 U/L    AST 17 0 - 39 U/L   Troponin   Result Value Ref Range    Troponin, High Sensitivity 13 (H) 0 - 11 ng/L   Brain Natriuretic Peptide   Result Value Ref Range    Pro- (H) 0 - 125 pg/mL   Lactic Acid   Result Value Ref Range    Lactic Acid 1.6 0.5 - 2.2 mmol/L   Urinalysis with Microscopic   Result Value Ref Range    Color, UA Yellow Straw/Yellow    Clarity, UA Clear Clear    Glucose, Ur Negative Negative mg/dL    Bilirubin Urine Negative Negative    Ketones, Urine Negative Negative mg/dL    Specific Gravity, UA >=1.030 1.005 - 1.030    Blood, Urine Negative Negative    pH, UA 6.0 5.0 - 9.0    Protein, UA 30 (A) Negative mg/dL    Urobilinogen, Urine 0.2 <2.0 E.U./dL    Nitrite, Urine Negative Negative    Leukocyte Esterase, Urine Negative Negative    WBC, UA NONE 0 - 5 /HPF    RBC, UA NONE 0 - 2 /HPF    Bacteria, UA NONE SEEN None Seen /HPF   Urine Drug Screen   Result Value Ref Range    Amphetamine Screen, Urine NOT DETECTED Negative <1000 ng/mL    Barbiturate Screen, Ur NOT DETECTED Negative < 200 ng/mL    Benzodiazepine Screen, Urine NOT DETECTED Negative < 200 ng/mL    Cannabinoid Scrn, Ur NOT DETECTED Negative < 50ng/mL    Cocaine Metabolite Screen, Urine NOT DETECTED Negative < 300 ng/mL    Opiate Scrn, Ur NOT DETECTED Negative < 300ng/mL    PCP Screen, Urine NOT DETECTED Negative < 25 ng/mL    Methadone Screen, Urine NOT DETECTED Negative <300 ng/mL    Oxycodone Urine NOT DETECTED Negative <100 ng/mL    FENTANYL SCREEN, URINE NOT DETECTED Negative <1 ng/mL    Drug Screen Comment: see below    TSH   Result Value Ref Range    TSH 1.490 0.270 - 4.200 uIU/mL   Comprehensive Metabolic Panel w/ Reflex to MG   Result Value Ref Range    Sodium 140 132 - 146 mmol/L    Potassium reflex Magnesium 4.0 3.5 - 5.0 mmol/L    Chloride 104 98 - 107 mmol/L    CO2 25 22 - 29 mmol/L    Anion Gap 11 7 - 16 mmol/L    Glucose 105 (H) 74 - 99 mg/dL    BUN 23 6 - 23 mg/dL    Creatinine 1.3 (H) 0.7 - 1.2 mg/dL    GFR Non-African American 54 >=60 mL/min/1.73    GFR African American >60     Calcium 8.8 8.6 - 10.2 mg/dL    Total Protein 6.8 6.4 - 8.3 g/dL    Albumin 3.6 3.5 - 5.2 g/dL    Total Bilirubin 0.5 0.0 - 1.2 mg/dL    Alkaline Phosphatase 52 40 - 129 U/L    ALT 16 0 - 40 U/L    AST 17 0 - 39 U/L   CBC with Auto Differential   Result Value Ref Range    WBC 7.6 4.5 - 11.5 E9/L    RBC 4.62 3.80 - 5.80 E12/L    Hemoglobin 14.0 12.5 - 16.5 g/dL    Hematocrit 43.8 37.0 - 54.0 %    MCV 94.8 80.0 - 99.9 fL    MCH 30.3 26.0 - 35.0 pg    MCHC 32.0 32.0 - 34.5 %    RDW 13.9 11.5 - 15.0 fL    Platelets 530 933 - 910 E9/L    MPV 10.6 7.0 - 12.0 fL    Neutrophils % 56.5 43.0 - 80.0 %    Immature Granulocytes % 0.3 0.0 - 5.0 %    Lymphocytes % 28.3 20.0 - 42.0 %    Monocytes % 12.1 (H) 2.0 - 12.0 %    Eosinophils % 2.5 0.0 - 6.0 %    Basophils % 0.3 0.0 - 2.0 %    Neutrophils Absolute 4.31 1.80 - 7.30 E9/L    Immature Granulocytes # 0.02 E9/L    Lymphocytes Absolute 2.15 1.50 - 4.00 E9/L    Monocytes Absolute 0.92 0.10 - 0.95 E9/L    Eosinophils Absolute 0.19 0.05 - 0.50 E9/L    Basophils Absolute 0.02 0.00 - 0.20 E9/L   Troponin   Result Value Ref Range    Troponin, High Sensitivity 16 (H) 0 - 11 ng/L   EKG 12 Lead   Result Value Ref Range    Ventricular Rate 85 BPM    Atrial Rate 85 BPM    P-R Interval 148 ms    QRS Duration 80 ms    Q-T Interval 390 ms    QTc Calculation (Bazett) 464 ms    P Axis 29 degrees    R Axis 1 degrees    T Axis 7 degrees       RADIOLOGY:  Interpreted by Radiologist.  CTA PULMONARY W CONTRAST   Final Result   1. No evidence of pulmonary embolism or acute pulmonary abnormality. 2. Large hiatal hernia. 3. View of the upper abdomen show severe calcified and noncalcified   atherosclerotic plaque involving the abdominal aorta.          XR CHEST PORTABLE   Final Result   1. Patchy left perihilar left lower lobe airspace disease. EKG:  This EKG is signed and interpreted by the EP. Time: 939  Rate: 85  Rhythm: Sinus and frequent PVCs  Interpretation: no acute changes  Comparison: None      ------------------------- NURSING NOTES AND VITALS REVIEWED ---------------------------   The nursing notes within the ED encounter and vital signs as below have been reviewed by myself. BP (!) 142/78   Pulse 82   Temp 97.8 °F (36.6 °C) (Oral)   Resp 20   Wt 172 lb (78 kg)   SpO2 95%   BMI 30.47 kg/m²   Oxygen Saturation Interpretation: Normal    The patients available past medical records and past encounters were reviewed.         ------------------------------ ED COURSE/MEDICAL DECISION MAKING----------------------  Medications   sodium chloride flush 0.9 % injection 10 mL (10 mLs IntraVENous Given 10/7/22 0901)   sodium chloride flush 0.9 % injection 10 mL (has no administration in time range)   0.9 % sodium chloride infusion (has no administration in time range)   enoxaparin (LOVENOX) injection 40 mg (40 mg SubCUTAneous Given 10/7/22 0901)   promethazine (PHENERGAN) tablet 12.5 mg (has no administration in time range)     Or   ondansetron (ZOFRAN) injection 4 mg (has no administration in time range)   polyethylene glycol (GLYCOLAX) packet 17 g (has no administration in time range)   acetaminophen (TYLENOL) tablet 650 mg (650 mg Oral Given 10/7/22 0900)     Or   acetaminophen (TYLENOL) suppository 650 mg ( Rectal See Alternative 10/7/22 0900)   cefTRIAXone (ROCEPHIN) 1,000 mg in sterile water 10 mL IV syringe (has no administration in time range)   doxycycline (VIBRAMYCIN) 100 mg in dextrose 5 % 100 mL IVPB ([id number]) (0 mg IntraVENous Stopped 10/7/22 0435)   losartan (COZAAR) tablet 25 mg (25 mg Oral Given 10/7/22 0901)   hydrALAZINE (APRESOLINE) injection 10 mg (has no administration in time range)   nitroglycerin (NITRO-BID) 2 % ointment 1 inch (1 inch Topical Given 10/6/22 1201)   aspirin chewable tablet 324 mg (324 mg Oral Given 10/6/22 1200)   cefTRIAXone (ROCEPHIN) 1,000 mg in sterile water 10 mL IV syringe (1,000 mg IntraVENous Given 10/6/22 1430)     And   doxycycline (VIBRAMYCIN) 100 mg in dextrose 5 % 100 mL IVPB ([id number]) (0 mg IntraVENous Stopped 10/6/22 1542)   iopamidol (ISOVUE-370) 76 % injection 75 mL (75 mLs IntraVENous Given 10/6/22 1724)         ED COURSE:       Medical Decision Making:    Xr with pna, seen in ed for same, failed o utpt tx, no pe, admit for further care      This patient's ED course included: a personal history and physicial examination    This patient has remained hemodynamically stable during their ED course. Re-Evaluations:             Re-evaluation. Patients symptoms show no change    Re-examination  10/6/22   11:40 AM EDT          Vital Signs:     Consultations:             sound    Critical Care:         Counseling: The emergency provider has spoken with the patient and discussed todays results, in addition to providing specific details for the plan of care and counseling regarding the diagnosis and prognosis. Questions are answered at this time and they are agreeable with the plan.       --------------------------------- IMPRESSION AND DISPOSITION ---------------------------------    IMPRESSION  1. Pneumonia of left lower lobe due to infectious organism        DISPOSITION  Disposition: Admit to telemetry  Patient condition is stable    NOTE: This report was transcribed using voice recognition software.  Every effort was made to ensure accuracy; however, inadvertent computerized transcription errors may be present        Eliseo Fink MD  10/07/22 7331

## 2022-10-06 NOTE — H&P
Hospitalist History & Physical      PCP: No primary care provider on file. Date of Service: Pt seen/examined on 10/6/2022     Chief Complaint:  had concerns including Fatigue (Pt has list of complaints, recently treated for pneumonia, here from [address] [place] [address] states he is out of his medications and  hasnt gotten them yet. Pt complaining of fatigue, arm pain into left chest, dry throat etc. ). History Of Present Illness:    Mr. Valentina Guerra, a 66y.o. year old male  who  has a past medical history of Arthritis and Prostate disorder. Patient presented to the emergency department with chest pain, shortness of breath and fatigue. Recently diagnosed with pneumonia is currently on oral antibiotics. Patient reporting chest pain that radiates into the left arm. Has persistent cough. Denies fever, chills, nausea, vomiting, abdominal pain. Vital signs show the patient be hypertensive with pressures as high as 211/115. Current pressures 187/93. The patient is afebrile. SPO2 94% on room air. Laboratory studies demonstrate creatinine 1.4, glucose 108, proBNP 810, troponin 13. Chest x-ray shows patchy left perihilar left lower lobe airspace disease. Patient given doses of doxycycline and ceftriaxone and medicine was consulted for admission. Past Medical History:   Diagnosis Date    Arthritis     Prostate disorder        History reviewed. No pertinent surgical history. Prior to Admission medications    Medication Sig Start Date End Date Taking? Authorizing Provider   cefdinir (OMNICEF) 300 MG capsule Take 1 capsule by mouth 2 times daily for 10 days 9/30/22 10/10/22  Pipe Pagan MD   doxycycline hyclate (VIBRA-TABS) 100 MG tablet Take 1 tablet by mouth 2 times daily for 10 days 9/30/22 10/10/22  Pipe Pagan MD         Allergies:  Patient has no known allergies. Social History:    TOBACCO:   reports that he has never smoked.  He has never used smokeless tobacco.  ETOH:   reports that he does not currently use alcohol. Family History:    Reviewed in detail and negative for DM, CAD, Cancer, CVA. Positive as follows\"  History reviewed. No pertinent family history. REVIEW OF SYSTEMS:   Pertinent positives as noted in the HPI. All other systems reviewed and negative. PHYSICAL EXAM:  BP (!) 187/93   Pulse 95   Temp 98.2 °F (36.8 °C)   Resp 22   Wt 172 lb (78 kg)   SpO2 94%   BMI 30.47 kg/m²   General appearance: No apparent distress, appears stated age and cooperative. HEENT: Normal cephalic, atraumatic without obvious deformity. Pupils equal, round, and reactive to light. Extra ocular muscles intact. Conjunctivae/corneas clear. Neck: Supple, with full range of motion. No jugular venous distention. Trachea midline. Respiratory: Clear to auscultation bilaterally  Cardiovascular: Regular rate and rhythm  Abdomen: Soft, nontender, nondistended  Musculoskeletal: No clubbing, cyanosis, edema of bilateral lower extremities. Brisk capillary refill. Skin: Normal skin color. No rashes or lesions. Neurologic:  Neurovascularly intact without any focal sensory/motor deficits. Cranial nerves: II-XII intact, grossly non-focal.  Psychiatric: Alert and oriented, thought content appropriate, normal insight    Reviewed EKG and CXR personally      CBC:   Recent Labs     10/06/22  0934   WBC 6.4   RBC 4.98   HGB 15.1   HCT 46.2   MCV 92.8   RDW 13.8        BMP:   Recent Labs     10/06/22  0934      K 3.9      CO2 24   BUN 20   CREATININE 1.4*     LFT:  Recent Labs     10/06/22  0934   PROT 7.5   ALKPHOS 61   ALT 18   AST 17   BILITOT 0.6     CE:  No results for input(s): James Beets in the last 72 hours. PT/INR: No results for input(s): INR, APTT in the last 72 hours. BNP: No results for input(s): BNP in the last 72 hours.   ESR: No results found for: SEDRATE  CRP: No results found for: CRP  D Dimer: No results found for: DDIMER   Folate and B12: No results found for: [id number], No results found for: FOLATE  Lactic Acid:   Lab Results   Component Value Date    LACTA 1.6 10/06/2022     Thyroid Studies:   Lab Results   Component Value Date    TSH 1.490 10/06/2022       Oupatient labs:  Lab Results   Component Value Date    TSH 1.490 10/06/2022    INR 1.1 09/30/2022       Urinalysis:    Lab Results   Component Value Date/Time    NITRU Negative 10/06/2022 09:34 AM    WBCUA NONE 10/06/2022 09:34 AM    BACTERIA NONE SEEN 10/06/2022 09:34 AM    RBCUA NONE 10/06/2022 09:34 AM    BLOODU Negative 10/06/2022 09:34 AM    SPECGRAV >=1.030 10/06/2022 09:34 AM    GLUCOSEU Negative 10/06/2022 09:34 AM       Imaging:  XR CHEST PORTABLE    Result Date: 10/6/2022  EXAMINATION: ONE XRAY VIEW OF THE CHEST 10/6/2022 12:46 pm COMPARISON: 09/30/2022 HISTORY: ORDERING SYSTEM PROVIDED HISTORY: pain  hx pneumonia TECHNOLOGIST PROVIDED HISTORY: Reason for exam:->pain  hx pneumonia FINDINGS: The cardiac silhouette is at upper limits of normal in size. There are no findings of CHF. The right lung is clear. There is patchy left perihilar airspace disease. There is no pleural effusion     1. Patchy left perihilar left lower lobe airspace disease. XR CHEST PORTABLE    Result Date: 9/30/2022  EXAMINATION: ONE XRAY VIEW OF THE CHEST 9/30/2022 2:48 pm COMPARISON: None. HISTORY: ORDERING SYSTEM PROVIDED HISTORY: weak TECHNOLOGIST PROVIDED HISTORY: Reason for exam:->weak What reading provider will be dictating this exam?->CRC FINDINGS: Mild left basilar atelectasis and or infiltrate. Heart and pulmonary vascularity are normal.  Neither costophrenic angle is blunted. Mild left basilar atelectasis and or infiltrate.        ASSESSMENT:  -Community-acquired pneumonia  -Chest pain  -Poorly controlled hypertension  -Elevated troponin      PLAN:  -Admit to medicine  -Doxycycline and ceftriaxone  -Respiratory cultures  -Telemetry  -Repeat troponin  -Hydralazine as needed  -Start losartan 25 mg daily        Diet: No diet orders on file  Code Status: No Order  Surrogate decision maker confirmed with patient:   No emergency contact information on file. DVT Prophylaxis: []Lovenox []Heparin []PCD [] [address]  []Encouraged ambulation  Disposition: []Med/Surg [] Intermediate [] ICU/CCU  Admit status: [] Observation [] Inpatient     +++++++++++++++++++++++++++++++++++++++++++++++++  Tamarae Card, DO  +++++++++++++++++++++++++++++++++++++++++++++++++  NOTE: This report was transcribed using voice recognition software. Every effort was made to ensure accuracy; however, inadvertent computerized transcription errors may be present.

## 2022-10-06 NOTE — ED NOTES
Provider at bedside     Juliet Emerson, [facility]  10/06/22 5723

## 2022-10-07 LAB
ADENOVIRUS BY PCR: NOT DETECTED
ALBUMIN SERPL-MCNC: 3.6 G/DL (ref 3.5–5.2)
ALP BLD-CCNC: 52 U/L (ref 40–129)
ALT SERPL-CCNC: 16 U/L (ref 0–40)
ANION GAP SERPL CALCULATED.3IONS-SCNC: 11 MMOL/L (ref 7–16)
AST SERPL-CCNC: 17 U/L (ref 0–39)
BASOPHILS ABSOLUTE: 0.02 E9/L (ref 0–0.2)
BASOPHILS RELATIVE PERCENT: 0.3 % (ref 0–2)
BILIRUB SERPL-MCNC: 0.5 MG/DL (ref 0–1.2)
BORDETELLA PARAPERTUSSIS BY PCR: NOT DETECTED
BORDETELLA PERTUSSIS BY PCR: NOT DETECTED
BUN BLDV-MCNC: 23 MG/DL (ref 6–23)
CALCIUM SERPL-MCNC: 8.8 MG/DL (ref 8.6–10.2)
CHLAMYDOPHILIA PNEUMONIAE BY PCR: NOT DETECTED
CHLORIDE BLD-SCNC: 104 MMOL/L (ref 98–107)
CO2: 25 MMOL/L (ref 22–29)
CORONAVIRUS 229E BY PCR: NOT DETECTED
CORONAVIRUS HKU1 BY PCR: NOT DETECTED
CORONAVIRUS NL63 BY PCR: NOT DETECTED
CORONAVIRUS OC43 BY PCR: NOT DETECTED
CREAT SERPL-MCNC: 1.3 MG/DL (ref 0.7–1.2)
EOSINOPHILS ABSOLUTE: 0.19 E9/L (ref 0.05–0.5)
EOSINOPHILS RELATIVE PERCENT: 2.5 % (ref 0–6)
GFR AFRICAN AMERICAN: >60
GFR NON-AFRICAN AMERICAN: 54 ML/MIN/1.73
GLUCOSE BLD-MCNC: 105 MG/DL (ref 74–99)
HCT VFR BLD CALC: 43.8 % (ref 37–54)
HEMOGLOBIN: 14 G/DL (ref 12.5–16.5)
HUMAN METAPNEUMOVIRUS BY PCR: NOT DETECTED
HUMAN RHINOVIRUS/ENTEROVIRUS BY PCR: NOT DETECTED
IMMATURE GRANULOCYTES #: 0.02 E9/L
IMMATURE GRANULOCYTES %: 0.3 % (ref 0–5)
INFLUENZA A BY PCR: NOT DETECTED
INFLUENZA B BY PCR: NOT DETECTED
LYMPHOCYTES ABSOLUTE: 2.15 E9/L (ref 1.5–4)
LYMPHOCYTES RELATIVE PERCENT: 28.3 % (ref 20–42)
MCH RBC QN AUTO: 30.3 PG (ref 26–35)
MCHC RBC AUTO-ENTMCNC: 32 % (ref 32–34.5)
MCV RBC AUTO: 94.8 FL (ref 80–99.9)
MONOCYTES ABSOLUTE: 0.92 E9/L (ref 0.1–0.95)
MONOCYTES RELATIVE PERCENT: 12.1 % (ref 2–12)
MYCOPLASMA PNEUMONIAE BY PCR: NOT DETECTED
NEUTROPHILS ABSOLUTE: 4.31 E9/L (ref 1.8–7.3)
NEUTROPHILS RELATIVE PERCENT: 56.5 % (ref 43–80)
PARAINFLUENZA VIRUS 1 BY PCR: NOT DETECTED
PARAINFLUENZA VIRUS 2 BY PCR: NOT DETECTED
PARAINFLUENZA VIRUS 3 BY PCR: NOT DETECTED
PARAINFLUENZA VIRUS 4 BY PCR: NOT DETECTED
PDW BLD-RTO: 13.9 FL (ref 11.5–15)
PLATELET # BLD: 217 E9/L (ref 130–450)
PMV BLD AUTO: 10.6 FL (ref 7–12)
POTASSIUM REFLEX MAGNESIUM: 4 MMOL/L (ref 3.5–5)
RBC # BLD: 4.62 E12/L (ref 3.8–5.8)
RESPIRATORY SYNCYTIAL VIRUS BY PCR: NOT DETECTED
SARS-COV-2, PCR: NOT DETECTED
SODIUM BLD-SCNC: 140 MMOL/L (ref 132–146)
TOTAL PROTEIN: 6.8 G/DL (ref 6.4–8.3)
TROPONIN, HIGH SENSITIVITY: 16 NG/L (ref 0–11)
WBC # BLD: 7.6 E9/L (ref 4.5–11.5)

## 2022-10-07 PROCEDURE — 85025 COMPLETE CBC W/AUTO DIFF WBC: CPT

## 2022-10-07 PROCEDURE — 6370000000 HC RX 637 (ALT 250 FOR IP): Performed by: FAMILY MEDICINE

## 2022-10-07 PROCEDURE — 1200000000 HC SEMI PRIVATE

## 2022-10-07 PROCEDURE — 2500000003 HC RX 250 WO HCPCS: Performed by: FAMILY MEDICINE

## 2022-10-07 PROCEDURE — 0202U NFCT DS 22 TRGT SARS-COV-2: CPT

## 2022-10-07 PROCEDURE — 36415 COLL VENOUS BLD VENIPUNCTURE: CPT

## 2022-10-07 PROCEDURE — 84484 ASSAY OF TROPONIN QUANT: CPT

## 2022-10-07 PROCEDURE — 2580000003 HC RX 258: Performed by: FAMILY MEDICINE

## 2022-10-07 PROCEDURE — 6360000002 HC RX W HCPCS: Performed by: FAMILY MEDICINE

## 2022-10-07 PROCEDURE — 6370000000 HC RX 637 (ALT 250 FOR IP): Performed by: INTERNAL MEDICINE

## 2022-10-07 PROCEDURE — 80053 COMPREHEN METABOLIC PANEL: CPT

## 2022-10-07 RX ORDER — PANTOPRAZOLE SODIUM 40 MG/1
40 TABLET, DELAYED RELEASE ORAL
Status: DISCONTINUED | OUTPATIENT
Start: 2022-10-07 | End: 2022-10-09

## 2022-10-07 RX ORDER — DOXYCYCLINE HYCLATE 100 MG/1
100 CAPSULE ORAL EVERY 12 HOURS SCHEDULED
Status: DISCONTINUED | OUTPATIENT
Start: 2022-10-07 | End: 2022-10-08

## 2022-10-07 RX ORDER — CALCIUM CARBONATE 200(500)MG
500 TABLET,CHEWABLE ORAL 3 TIMES DAILY PRN
Status: DISCONTINUED | OUTPATIENT
Start: 2022-10-07 | End: 2022-10-14 | Stop reason: HOSPADM

## 2022-10-07 RX ADMIN — SODIUM CHLORIDE, PRESERVATIVE FREE 10 ML: 5 INJECTION INTRAVENOUS at 09:01

## 2022-10-07 RX ADMIN — PANTOPRAZOLE SODIUM 40 MG: 40 TABLET, DELAYED RELEASE ORAL at 17:16

## 2022-10-07 RX ADMIN — ACETAMINOPHEN 650 MG: 325 TABLET ORAL at 20:17

## 2022-10-07 RX ADMIN — ENOXAPARIN SODIUM 40 MG: 100 INJECTION SUBCUTANEOUS at 09:01

## 2022-10-07 RX ADMIN — ACETAMINOPHEN 650 MG: 325 TABLET ORAL at 09:00

## 2022-10-07 RX ADMIN — DOXYCYCLINE 100 MG: 100 INJECTION, POWDER, LYOPHILIZED, FOR SOLUTION INTRAVENOUS at 15:31

## 2022-10-07 RX ADMIN — CEFTRIAXONE 1000 MG: 1 INJECTION, POWDER, FOR SOLUTION INTRAMUSCULAR; INTRAVENOUS at 14:47

## 2022-10-07 RX ADMIN — DOXYCYCLINE HYCLATE 100 MG: 100 CAPSULE ORAL at 21:46

## 2022-10-07 RX ADMIN — CALCIUM CARBONATE 500 MG: 500 TABLET, CHEWABLE ORAL at 22:49

## 2022-10-07 RX ADMIN — LOSARTAN POTASSIUM 25 MG: 25 TABLET, FILM COATED ORAL at 09:01

## 2022-10-07 RX ADMIN — SODIUM CHLORIDE, PRESERVATIVE FREE 10 ML: 5 INJECTION INTRAVENOUS at 20:19

## 2022-10-07 RX ADMIN — DOXYCYCLINE 100 MG: 100 INJECTION, POWDER, LYOPHILIZED, FOR SOLUTION INTRAVENOUS at 03:35

## 2022-10-07 RX ADMIN — SODIUM CHLORIDE, PRESERVATIVE FREE 10 ML: 5 INJECTION INTRAVENOUS at 03:35

## 2022-10-07 RX ADMIN — ONDANSETRON 4 MG: 2 INJECTION INTRAMUSCULAR; INTRAVENOUS at 14:54

## 2022-10-07 ASSESSMENT — PAIN SCALES - GENERAL
PAINLEVEL_OUTOF10: 0
PAINLEVEL_OUTOF10: 4
PAINLEVEL_OUTOF10: 8

## 2022-10-07 ASSESSMENT — PAIN DESCRIPTION - ORIENTATION: ORIENTATION: MID

## 2022-10-07 ASSESSMENT — PAIN DESCRIPTION - DESCRIPTORS: DESCRIPTORS: ACHING;POUNDING

## 2022-10-07 ASSESSMENT — PAIN DESCRIPTION - LOCATION
LOCATION: HEAD
LOCATION: HEAD

## 2022-10-07 NOTE — ED NOTES
Report called to [facility], all questions answered.      Lily Prieto RN  10/06/22 0000

## 2022-10-07 NOTE — CARE COORDINATION
Care Coordination:  Presented to ED from his dorm at [address] [place] [address] with fatigue, SOB  . Recently discharged  . Pneumonia. IV antibiotic treatment . Patient resides in Dorm at [address] [facility] [phone] . His  is Domitila Ledesma at [address]. Patient has no family . No home ,. Was incarcerated long term at [facility] in [place]. He will return to [facility] at discharge.  should be contacted for transportation.

## 2022-10-07 NOTE — PROGRESS NOTES
Hospitalist Progress Note      SYNOPSIS: Patient admitted on 10/6/2022 for Community acquired pneumonia of both lower lobes  66years of age male patient [facility] from [facility] for symptoms of not feeling well. Patient apparently was recently treated for pneumonia  Symptom complex included fatigue      SUBJECTIVE: Above description based upon chief complaint documented history and physical  In addition to this patient stated that he experienced a headache warmness in the back of his neck his eyes were watering he did notice some nausea in the overall he was having some sore throat and felt lousy  He did have a dry hacking cough productive of sputum and occasionally \"regurgitation and particles of food coming up with the cough  He did experience some chest wall discomfort with the cough    Review of systems  Patient now denies headache denies fever denies abdominal pain  Although the reason for admission is documented as pneumonia  Which was not unreasonable given that the chest x-ray suggested some abnormality in the lung fields  CT scan of the chest to rule out PE not only did not find evidence of pulmonary emboli it also did not reveal any consolidation or infiltrate in the lungs  The scan did reveal evidence of hiatal hernia    Past medical history  Listed is arthritis and prostate disorder  The patient states that he suffered an esophageal rupture several years ago  Past surgical history  Surgical repair of the esophagus over 10 years ago  Temp (24hrs), Av.9 °F (36.6 °C), Min:97.2 °F (36.2 °C), Max:98.5 °F (36.9 °C)    DIET: ADULT DIET; Regular  Diet NPO  CODE: Full Code    OBJECTIVE:    BP (!) 158/70   Pulse 80   Temp 97.2 °F (36.2 °C) (Temporal)   Resp 18   Wt 172 lb (78 kg)   SpO2 96%   BMI 30.47 kg/m²     Physical Exam  Vitals reviewed. Constitutional:       General: He is not in acute distress. Appearance: He is not toxic-appearing or diaphoretic.       Comments: Elderly male HENT:      Right Ear: External ear normal.      Left Ear: External ear normal.      Nose: Nose normal.      Mouth/Throat:      Mouth: Mucous membranes are moist.   Eyes:      Conjunctiva/sclera: Conjunctivae normal.   Cardiovascular:      Rate and Rhythm: Normal rate and regular rhythm. Pulses: Normal pulses. Heart sounds: Murmur heard. Comments: Golf's pulse noted  Pulmonary:      Effort: Pulmonary effort is normal. No respiratory distress. Breath sounds: No wheezing or rhonchi. Abdominal:      General: Abdomen is flat. Bowel sounds are normal. There is no distension. Skin:     General: Skin is warm. Neurological:      Mental Status: He is alert.       Comments: Awake and alert speech is clear   Psychiatric:         Mood and Affect: Mood normal.         Behavior: Behavior normal.        ASSESSMENT:  Recent diagnosis of pneumonia  Symptom complex and presentation and clinical scenario does not seem consistent with pneumonia that is active at this time       Hiatal hernia    Chest wall pain/atypical for anginal symptoms  Elev ser cr-may repres CKD stage III  PLAN:    Stop IV antibiotics  Begin oral doxycycline  Begin oral Protonix for treatment of hiatal hernia    Lexiscan non exercise stress test  Consider op echo for diastol murmu    DISPOSITION: Not stable for home yet    Medications:  REVIEWED DAILY    Infusion Medications    sodium chloride       Scheduled Medications    pantoprazole  40 mg Oral QAM AC    doxycycline hyclate  100 mg Oral 2 times per day    sodium chloride flush  10 mL IntraVENous 2 times per day    enoxaparin  40 mg SubCUTAneous Daily    losartan  25 mg Oral Daily     PRN Meds: regadenoson, sodium chloride flush, sodium chloride, promethazine **OR** ondansetron, polyethylene glycol, acetaminophen **OR** acetaminophen, hydrALAZINE    Labs:     Recent Labs     10/06/22  0934 10/07/22  0435   WBC 6.4 7.6   HGB 15.1 14.0   HCT 46.2 43.8    217       Recent Labs 10/06/22  0934 10/07/22  0435    140   K 3.9 4.0    104   CO2 24 25   BUN 20 23   CREATININE 1.4* 1.3*   CALCIUM 9.5 8.8       Recent Labs     10/06/22  0934 10/07/22  0435   PROT 7.5 6.8   ALKPHOS 61 52   ALT 18 16   AST 17 17   BILITOT 0.6 0.5       Chronic labs:    Lab Results   Component Value Date    TSH 1.490 10/06/2022    INR 1.1 09/30/2022       Radiology:     +++++++++++++++++++++++++++++++++++++++++++++++++  DO Vikas oMntero Ala Physician - [address] [place], [place]  +++++++++++++++++++++++++++++++++++++++++++++++++  NOTE: This report was transcribed using voice recognition software. Every effort was made to ensure accuracy; however, inadvertent computerized transcription errors may be present.

## 2022-10-07 NOTE — PROGRESS NOTES
[facility] and floor number given. Spoke with Afua Wyman.   Burgess Weaver-   [phone]  [address]

## 2022-10-08 ENCOUNTER — APPOINTMENT (OUTPATIENT)
Dept: NUCLEAR MEDICINE | Age: 72
DRG: 254 | End: 2022-10-08
Payer: MEDICAID

## 2022-10-08 LAB
ALBUMIN SERPL-MCNC: 3.7 G/DL (ref 3.5–5.2)
ALP BLD-CCNC: 50 U/L (ref 40–129)
ALT SERPL-CCNC: 13 U/L (ref 0–40)
ANION GAP SERPL CALCULATED.3IONS-SCNC: 14 MMOL/L (ref 7–16)
AST SERPL-CCNC: 18 U/L (ref 0–39)
BASOPHILS ABSOLUTE: 0.04 E9/L (ref 0–0.2)
BASOPHILS RELATIVE PERCENT: 0.7 % (ref 0–2)
BILIRUB SERPL-MCNC: 0.4 MG/DL (ref 0–1.2)
BUN BLDV-MCNC: 24 MG/DL (ref 6–23)
C-REACTIVE PROTEIN: 0.8 MG/DL (ref 0–0.4)
CALCIUM SERPL-MCNC: 9.2 MG/DL (ref 8.6–10.2)
CHLORIDE BLD-SCNC: 105 MMOL/L (ref 98–107)
CO2: 23 MMOL/L (ref 22–29)
CREAT SERPL-MCNC: 1.4 MG/DL (ref 0.7–1.2)
EOSINOPHILS ABSOLUTE: 0.29 E9/L (ref 0.05–0.5)
EOSINOPHILS RELATIVE PERCENT: 4.9 % (ref 0–6)
GFR AFRICAN AMERICAN: >60
GFR NON-AFRICAN AMERICAN: 50 ML/MIN/1.73
GLUCOSE BLD-MCNC: 102 MG/DL (ref 74–99)
HCT VFR BLD CALC: 42.3 % (ref 37–54)
HEMOGLOBIN: 13.5 G/DL (ref 12.5–16.5)
IMMATURE GRANULOCYTES #: 0.01 E9/L
IMMATURE GRANULOCYTES %: 0.2 % (ref 0–5)
LV EF: 69 %
LVEF MODALITY: NORMAL
LYMPHOCYTES ABSOLUTE: 2.1 E9/L (ref 1.5–4)
LYMPHOCYTES RELATIVE PERCENT: 35.6 % (ref 20–42)
MCH RBC QN AUTO: 30.3 PG (ref 26–35)
MCHC RBC AUTO-ENTMCNC: 31.9 % (ref 32–34.5)
MCV RBC AUTO: 95.1 FL (ref 80–99.9)
MONOCYTES ABSOLUTE: 0.54 E9/L (ref 0.1–0.95)
MONOCYTES RELATIVE PERCENT: 9.2 % (ref 2–12)
NEUTROPHILS ABSOLUTE: 2.92 E9/L (ref 1.8–7.3)
NEUTROPHILS RELATIVE PERCENT: 49.4 % (ref 43–80)
PDW BLD-RTO: 13.8 FL (ref 11.5–15)
PLATELET # BLD: 199 E9/L (ref 130–450)
PMV BLD AUTO: 10.8 FL (ref 7–12)
POTASSIUM REFLEX MAGNESIUM: 4.2 MMOL/L (ref 3.5–5)
PROCALCITONIN: 0.08 NG/ML (ref 0–0.08)
RBC # BLD: 4.45 E12/L (ref 3.8–5.8)
SODIUM BLD-SCNC: 142 MMOL/L (ref 132–146)
TOTAL PROTEIN: 6.5 G/DL (ref 6.4–8.3)
WBC # BLD: 5.9 E9/L (ref 4.5–11.5)

## 2022-10-08 PROCEDURE — 80053 COMPREHEN METABOLIC PANEL: CPT

## 2022-10-08 PROCEDURE — 6370000000 HC RX 637 (ALT 250 FOR IP): Performed by: INTERNAL MEDICINE

## 2022-10-08 PROCEDURE — 3430000000 HC RX DIAGNOSTIC RADIOPHARMACEUTICAL: Performed by: RADIOLOGY

## 2022-10-08 PROCEDURE — 78452 HT MUSCLE IMAGE SPECT MULT: CPT | Performed by: INTERNAL MEDICINE

## 2022-10-08 PROCEDURE — 93017 CV STRESS TEST TRACING ONLY: CPT

## 2022-10-08 PROCEDURE — A9500 TC99M SESTAMIBI: HCPCS | Performed by: RADIOLOGY

## 2022-10-08 PROCEDURE — 93016 CV STRESS TEST SUPVJ ONLY: CPT | Performed by: INTERNAL MEDICINE

## 2022-10-08 PROCEDURE — 87449 NOS EACH ORGANISM AG IA: CPT

## 2022-10-08 PROCEDURE — 78452 HT MUSCLE IMAGE SPECT MULT: CPT

## 2022-10-08 PROCEDURE — 6370000000 HC RX 637 (ALT 250 FOR IP): Performed by: FAMILY MEDICINE

## 2022-10-08 PROCEDURE — 84145 PROCALCITONIN (PCT): CPT

## 2022-10-08 PROCEDURE — 2580000003 HC RX 258: Performed by: FAMILY MEDICINE

## 2022-10-08 PROCEDURE — 36415 COLL VENOUS BLD VENIPUNCTURE: CPT

## 2022-10-08 PROCEDURE — 93018 CV STRESS TEST I&R ONLY: CPT | Performed by: INTERNAL MEDICINE

## 2022-10-08 PROCEDURE — 86140 C-REACTIVE PROTEIN: CPT

## 2022-10-08 PROCEDURE — 6360000002 HC RX W HCPCS: Performed by: FAMILY MEDICINE

## 2022-10-08 PROCEDURE — 85025 COMPLETE CBC W/AUTO DIFF WBC: CPT

## 2022-10-08 PROCEDURE — 1200000000 HC SEMI PRIVATE

## 2022-10-08 PROCEDURE — 6360000002 HC RX W HCPCS: Performed by: INTERNAL MEDICINE

## 2022-10-08 RX ADMIN — SODIUM CHLORIDE, PRESERVATIVE FREE 10 ML: 5 INJECTION INTRAVENOUS at 20:27

## 2022-10-08 RX ADMIN — ENOXAPARIN SODIUM 40 MG: 100 INJECTION SUBCUTANEOUS at 08:34

## 2022-10-08 RX ADMIN — LOSARTAN POTASSIUM 25 MG: 25 TABLET, FILM COATED ORAL at 08:34

## 2022-10-08 RX ADMIN — ACETAMINOPHEN 650 MG: 325 TABLET ORAL at 20:28

## 2022-10-08 RX ADMIN — Medication 13.1 MILLICURIE: at 10:30

## 2022-10-08 RX ADMIN — SODIUM CHLORIDE, PRESERVATIVE FREE 10 ML: 5 INJECTION INTRAVENOUS at 08:34

## 2022-10-08 RX ADMIN — Medication 39.7 MILLICURIE: at 12:55

## 2022-10-08 RX ADMIN — REGADENOSON 0.4 MG: 0.08 INJECTION, SOLUTION INTRAVENOUS at 12:52

## 2022-10-08 RX ADMIN — DOXYCYCLINE HYCLATE 100 MG: 100 CAPSULE ORAL at 08:34

## 2022-10-08 ASSESSMENT — PAIN SCALES - GENERAL: PAINLEVEL_OUTOF10: 6

## 2022-10-08 NOTE — PROGRESS NOTES
Hospitalist Progress Note      SYNOPSIS: Patient admitted on 10/6/2022 for Community acquired pneumonia of both lower lobes  66years of age male [facility] of not feeling well. Patient apparently was recently treated for pneumonia  Symptom complex included fatigue      SUBJECTIVE: in midst of stress test completion  Procal is low  Resp pcr negative    Review of systems  + cough + cp with cough  Past medical history  Listed is arthritis and prostate disorder  The patient states that he suffered an esophageal rupture several years ago  Past surgical history  Surgical repair of the esophagus over 10 years ago  Temp (24hrs), Av °F (36.7 °C), Min:97.6 °F (36.4 °C), Max:98.2 °F (36.8 °C)    DIET: ADULT DIET; Regular  CODE: Full Code    OBJECTIVE:    BP (!) 153/93   Pulse 88   Temp 98.1 °F (36.7 °C) (Oral)   Resp 18   Wt 172 lb (78 kg)   SpO2 95%   BMI 30.47 kg/m²     Physical Exam  Vitals reviewed. Constitutional:       General: He is not in acute distress. Appearance: He is not toxic-appearing or diaphoretic. Comments: Elderly male   HENT:      Right Ear: External ear normal.      Left Ear: External ear normal.      Nose: Nose normal.      Mouth/Throat:      Mouth: Mucous membranes are moist.   Eyes:      Conjunctiva/sclera: Conjunctivae normal.   Cardiovascular:      Rate and Rhythm: Normal rate and regular rhythm. Pulses: Normal pulses. Heart sounds: Murmur heard. Comments: Bend's pulse noted  Pulmonary:      Effort: Pulmonary effort is normal. No respiratory distress. Breath sounds: No wheezing or rhonchi. Abdominal:      General: Abdomen is flat. Bowel sounds are normal. There is no distension. Skin:     General: Skin is warm. Neurological:      Mental Status: He is alert.       Comments: Awake and alert speech is clear   Psychiatric:         Mood and Affect: Mood normal.         Behavior: Behavior normal. ASSESSMENT:  Atypi cpain  Pneumonia ruled out  Hiatal hernia    Chest wall pain/atypical for anginal symptoms  Elev ser cr-may repres CKD stage III  PLAN:  Stop doxycycline   Protonix for treatment of hiatal hernia  Repeat cxr in am    F-up Lexiscan non exercise stress test    DISPOSITION: Not stable for home yet    Medications:  REVIEWED DAILY    Infusion Medications    sodium chloride       Scheduled Medications    pantoprazole  40 mg Oral QAM AC    doxycycline hyclate  100 mg Oral 2 times per day    sodium chloride flush  10 mL IntraVENous 2 times per day    enoxaparin  40 mg SubCUTAneous Daily    losartan  25 mg Oral Daily     PRN Meds: calcium carbonate, sodium chloride flush, sodium chloride, promethazine **OR** ondansetron, polyethylene glycol, acetaminophen **OR** acetaminophen, hydrALAZINE    Labs:     Recent Labs     10/06/22  0934 10/07/22  0435 10/08/22  0448   WBC 6.4 7.6 5.9   HGB 15.1 14.0 13.5   HCT 46.2 43.8 42.3    217 199         Recent Labs     10/06/22  0934 10/07/22  0435 10/08/22  0448    140 142   K 3.9 4.0 4.2    104 105   CO2 24 25 23   BUN 20 23 24*   CREATININE 1.4* 1.3* 1.4*   CALCIUM 9.5 8.8 9.2         Recent Labs     10/06/22  0934 10/07/22  0435 10/08/22  0448   PROT 7.5 6.8 6.5   ALKPHOS 61 52 50   ALT 18 16 13   AST 17 17 18   BILITOT 0.6 0.5 0.4         Chronic labs:    Lab Results   Component Value Date    TSH 1.490 10/06/2022    INR 1.1 09/30/2022       Radiology:     +++++++++++++++++++++++++++++++++++++++++++++++++  Savanna Dunbar DO  [place] [address], [place]  +++++++++++++++++++++++++++++++++++++++++++++++++  NOTE: This report was transcribed using voice recognition software. Every effort was made to ensure accuracy; however, inadvertent computerized transcription errors may be present.

## 2022-10-08 NOTE — PROCEDURES
Daniela Lou[facility] Nuclear Stress Test:    Cardiologist: Dr. Shakira Sotelo EKG: Normal sinus rhythm, normal EKG    Indications for study: Chest pain    1. No chest pain  2. No new arrhythmias  3. No EKG changes suggestive of stress induced ischemia  4. Nuclear images pending    Chandan Fulton MD., [facility].    [facility]) Cardiology

## 2022-10-09 ENCOUNTER — APPOINTMENT (OUTPATIENT)
Dept: GENERAL RADIOLOGY | Age: 72
DRG: 254 | End: 2022-10-09
Payer: MEDICAID

## 2022-10-09 PROBLEM — K44.9 HIATAL HERNIA: Status: ACTIVE | Noted: 2022-10-09

## 2022-10-09 LAB
ALBUMIN SERPL-MCNC: 3.6 G/DL (ref 3.5–5.2)
ALP BLD-CCNC: 53 U/L (ref 40–129)
ALT SERPL-CCNC: 16 U/L (ref 0–40)
ANION GAP SERPL CALCULATED.3IONS-SCNC: 13 MMOL/L (ref 7–16)
AST SERPL-CCNC: 19 U/L (ref 0–39)
BASOPHILS ABSOLUTE: 0.03 E9/L (ref 0–0.2)
BASOPHILS RELATIVE PERCENT: 0.5 % (ref 0–2)
BILIRUB SERPL-MCNC: 0.6 MG/DL (ref 0–1.2)
BUN BLDV-MCNC: 23 MG/DL (ref 6–23)
CALCIUM SERPL-MCNC: 8.7 MG/DL (ref 8.6–10.2)
CHLORIDE BLD-SCNC: 104 MMOL/L (ref 98–107)
CO2: 22 MMOL/L (ref 22–29)
CREAT SERPL-MCNC: 1.3 MG/DL (ref 0.7–1.2)
EOSINOPHILS ABSOLUTE: 0.28 E9/L (ref 0.05–0.5)
EOSINOPHILS RELATIVE PERCENT: 4.3 % (ref 0–6)
GFR AFRICAN AMERICAN: >60
GFR NON-AFRICAN AMERICAN: 54 ML/MIN/1.73
GLUCOSE BLD-MCNC: 95 MG/DL (ref 74–99)
HCT VFR BLD CALC: 44.1 % (ref 37–54)
HEMOGLOBIN: 13.9 G/DL (ref 12.5–16.5)
IMMATURE GRANULOCYTES #: 0.02 E9/L
IMMATURE GRANULOCYTES %: 0.3 % (ref 0–5)
L. PNEUMOPHILA SEROGP 1 UR AG: NORMAL
LYMPHOCYTES ABSOLUTE: 2.2 E9/L (ref 1.5–4)
LYMPHOCYTES RELATIVE PERCENT: 34 % (ref 20–42)
MCH RBC QN AUTO: 31.2 PG (ref 26–35)
MCHC RBC AUTO-ENTMCNC: 31.5 % (ref 32–34.5)
MCV RBC AUTO: 98.9 FL (ref 80–99.9)
MONOCYTES ABSOLUTE: 0.61 E9/L (ref 0.1–0.95)
MONOCYTES RELATIVE PERCENT: 9.4 % (ref 2–12)
NEUTROPHILS ABSOLUTE: 3.33 E9/L (ref 1.8–7.3)
NEUTROPHILS RELATIVE PERCENT: 51.5 % (ref 43–80)
PDW BLD-RTO: 13.7 FL (ref 11.5–15)
PLATELET # BLD: 203 E9/L (ref 130–450)
PMV BLD AUTO: 10.4 FL (ref 7–12)
POTASSIUM REFLEX MAGNESIUM: 3.7 MMOL/L (ref 3.5–5)
PROCALCITONIN: 0.06 NG/ML (ref 0–0.08)
RBC # BLD: 4.46 E12/L (ref 3.8–5.8)
SODIUM BLD-SCNC: 139 MMOL/L (ref 132–146)
STREP PNEUMONIAE ANTIGEN, URINE: NORMAL
TOTAL PROTEIN: 6.9 G/DL (ref 6.4–8.3)
WBC # BLD: 6.5 E9/L (ref 4.5–11.5)

## 2022-10-09 PROCEDURE — 2580000003 HC RX 258: Performed by: FAMILY MEDICINE

## 2022-10-09 PROCEDURE — 6370000000 HC RX 637 (ALT 250 FOR IP): Performed by: FAMILY MEDICINE

## 2022-10-09 PROCEDURE — 6360000002 HC RX W HCPCS: Performed by: FAMILY MEDICINE

## 2022-10-09 PROCEDURE — 85025 COMPLETE CBC W/AUTO DIFF WBC: CPT

## 2022-10-09 PROCEDURE — 36415 COLL VENOUS BLD VENIPUNCTURE: CPT

## 2022-10-09 PROCEDURE — 80053 COMPREHEN METABOLIC PANEL: CPT

## 2022-10-09 PROCEDURE — 84145 PROCALCITONIN (PCT): CPT

## 2022-10-09 PROCEDURE — 6370000000 HC RX 637 (ALT 250 FOR IP): Performed by: INTERNAL MEDICINE

## 2022-10-09 PROCEDURE — 1200000000 HC SEMI PRIVATE

## 2022-10-09 PROCEDURE — 71046 X-RAY EXAM CHEST 2 VIEWS: CPT

## 2022-10-09 RX ORDER — PANTOPRAZOLE SODIUM 40 MG/1
40 TABLET, DELAYED RELEASE ORAL
Status: DISCONTINUED | OUTPATIENT
Start: 2022-10-10 | End: 2022-10-11

## 2022-10-09 RX ADMIN — HYDRALAZINE HYDROCHLORIDE 10 MG: 20 INJECTION INTRAMUSCULAR; INTRAVENOUS at 09:32

## 2022-10-09 RX ADMIN — SODIUM CHLORIDE, PRESERVATIVE FREE 10 ML: 5 INJECTION INTRAVENOUS at 20:14

## 2022-10-09 RX ADMIN — ONDANSETRON 4 MG: 2 INJECTION INTRAMUSCULAR; INTRAVENOUS at 04:53

## 2022-10-09 RX ADMIN — ENOXAPARIN SODIUM 40 MG: 100 INJECTION SUBCUTANEOUS at 09:26

## 2022-10-09 RX ADMIN — SODIUM CHLORIDE, PRESERVATIVE FREE 10 ML: 5 INJECTION INTRAVENOUS at 09:26

## 2022-10-09 RX ADMIN — ONDANSETRON 4 MG: 2 INJECTION INTRAMUSCULAR; INTRAVENOUS at 20:14

## 2022-10-09 RX ADMIN — PANTOPRAZOLE SODIUM 40 MG: 40 TABLET, DELAYED RELEASE ORAL at 04:55

## 2022-10-09 RX ADMIN — LOSARTAN POTASSIUM 25 MG: 25 TABLET, FILM COATED ORAL at 09:26

## 2022-10-09 ASSESSMENT — PAIN SCALES - GENERAL: PAINLEVEL_OUTOF10: 0

## 2022-10-09 NOTE — PROGRESS NOTES
Hospitalist Progress Note      SYNOPSIS: Patient admitted on 10/6/2022 for Hiatal hernia      SUBJECTIVE:    Still experiencing regurgit of food  particles   Lexiscan= low risk no evid of ischemia  Temp (24hrs), Av.4 °F (36.9 °C), Min:97.9 °F (36.6 °C), Max:99.8 °F (37.7 °C)    DIET: ADULT DIET; Regular  CODE: Full Code    OBJECTIVE:    BP (!) 150/70   Pulse 68   Temp 98 °F (36.7 °C) (Oral)   Resp 19   Wt 172 lb (78 kg)   SpO2 96%   BMI 30.47 kg/m²     General appearance: Not jaundiced not diaphoretic  Eyes=  HEENT: No obvious swelling to lips or tongue  Neck:  Respiratory: Clear to auscultation bilaterally, unlabored respiratory pattern at rest  Cardiovascular:  Audible S1-S2 no lower extremity edema  Abdomen:  Musculoskeletal:   Skin: No petechiae or hives on inspection warm to palpation  Neurologic: awake, alert speech is clear     ASSESSMENT:    Hiatal hernia symptomatic  Ruled out for pneumonia  Ruled out for ACS     PLAN:    Ppi bid  Will need op f-up for esophag hernia    DISPOSITION: return to [facility] home    Medications:  REVIEWED DAILY    Infusion Medications    sodium chloride       Scheduled Medications    pantoprazole  40 mg Oral QAM AC    sodium chloride flush  10 mL IntraVENous 2 times per day    enoxaparin  40 mg SubCUTAneous Daily    losartan  25 mg Oral Daily     PRN Meds: calcium carbonate, sodium chloride flush, sodium chloride, promethazine **OR** ondansetron, polyethylene glycol, acetaminophen **OR** acetaminophen, hydrALAZINE    Labs:     Recent Labs     10/07/22  0435 10/08/22  0448 10/09/22  0646   WBC 7.6 5.9 6.5   HGB 14.0 13.5 13.9   HCT 43.8 42.3 44.1    199 203       Recent Labs     10/07/22  0435 10/08/22  0448 10/09/22  0646    142 139   K 4.0 4.2 3.7    105 104   CO2 25 23 22   BUN 23 24* 23   CREATININE 1.3* 1.4* 1.3*   CALCIUM 8.8 9.2 8.7       Recent Labs     10/07/22  0435 10/08/22  0448 10/09/22  0646   PROT 6.8 6.5 6.9   ALKPHOS 52 50 53   ALT 16 13 16   AST 17 18 19   BILITOT 0.5 0.4 0.6     Chronic labs:    Lab Results   Component Value Date    TSH 1.490 10/06/2022    INR 1.1 09/30/2022       Radiology:   +++++++++++++++++++++++++++++++++++++++++++++++++  DO Vikas Lemus Physician - [address] [place], [place]  +++++++++++++++++++++++++++++++++++++++++++++++++  NOTE: This report was transcribed using voice recognition software. Every effort was made to ensure accuracy; however, inadvertent computerized transcription errors may be present.

## 2022-10-10 LAB
ALBUMIN SERPL-MCNC: 3.5 G/DL (ref 3.5–5.2)
ALP BLD-CCNC: 46 U/L (ref 40–129)
ALT SERPL-CCNC: 16 U/L (ref 0–40)
ANION GAP SERPL CALCULATED.3IONS-SCNC: 10 MMOL/L (ref 7–16)
AST SERPL-CCNC: 17 U/L (ref 0–39)
BASOPHILS ABSOLUTE: 0.04 E9/L (ref 0–0.2)
BASOPHILS RELATIVE PERCENT: 0.6 % (ref 0–2)
BILIRUB SERPL-MCNC: 0.4 MG/DL (ref 0–1.2)
BUN BLDV-MCNC: 25 MG/DL (ref 6–23)
CALCIUM SERPL-MCNC: 9.1 MG/DL (ref 8.6–10.2)
CHLORIDE BLD-SCNC: 109 MMOL/L (ref 98–107)
CO2: 25 MMOL/L (ref 22–29)
CREAT SERPL-MCNC: 1.4 MG/DL (ref 0.7–1.2)
EOSINOPHILS ABSOLUTE: 0.25 E9/L (ref 0.05–0.5)
EOSINOPHILS RELATIVE PERCENT: 4 % (ref 0–6)
GFR AFRICAN AMERICAN: >60
GFR NON-AFRICAN AMERICAN: 50 ML/MIN/1.73
GLUCOSE BLD-MCNC: 103 MG/DL (ref 74–99)
HCT VFR BLD CALC: 41.4 % (ref 37–54)
HEMOGLOBIN: 13.2 G/DL (ref 12.5–16.5)
IMMATURE GRANULOCYTES #: 0.01 E9/L
IMMATURE GRANULOCYTES %: 0.2 % (ref 0–5)
LYMPHOCYTES ABSOLUTE: 1.85 E9/L (ref 1.5–4)
LYMPHOCYTES RELATIVE PERCENT: 29.4 % (ref 20–42)
MCH RBC QN AUTO: 30.5 PG (ref 26–35)
MCHC RBC AUTO-ENTMCNC: 31.9 % (ref 32–34.5)
MCV RBC AUTO: 95.6 FL (ref 80–99.9)
MONOCYTES ABSOLUTE: 0.57 E9/L (ref 0.1–0.95)
MONOCYTES RELATIVE PERCENT: 9.1 % (ref 2–12)
NEUTROPHILS ABSOLUTE: 3.57 E9/L (ref 1.8–7.3)
NEUTROPHILS RELATIVE PERCENT: 56.7 % (ref 43–80)
PDW BLD-RTO: 14 FL (ref 11.5–15)
PLATELET # BLD: 209 E9/L (ref 130–450)
PMV BLD AUTO: 10.7 FL (ref 7–12)
POTASSIUM REFLEX MAGNESIUM: 3.8 MMOL/L (ref 3.5–5)
RBC # BLD: 4.33 E12/L (ref 3.8–5.8)
SODIUM BLD-SCNC: 144 MMOL/L (ref 132–146)
TOTAL PROTEIN: 6.5 G/DL (ref 6.4–8.3)
WBC # BLD: 6.3 E9/L (ref 4.5–11.5)

## 2022-10-10 PROCEDURE — 6370000000 HC RX 637 (ALT 250 FOR IP): Performed by: FAMILY MEDICINE

## 2022-10-10 PROCEDURE — 85025 COMPLETE CBC W/AUTO DIFF WBC: CPT

## 2022-10-10 PROCEDURE — 6360000002 HC RX W HCPCS: Performed by: FAMILY MEDICINE

## 2022-10-10 PROCEDURE — 2580000003 HC RX 258: Performed by: FAMILY MEDICINE

## 2022-10-10 PROCEDURE — 80053 COMPREHEN METABOLIC PANEL: CPT

## 2022-10-10 PROCEDURE — 36415 COLL VENOUS BLD VENIPUNCTURE: CPT

## 2022-10-10 PROCEDURE — 1200000000 HC SEMI PRIVATE

## 2022-10-10 PROCEDURE — 6370000000 HC RX 637 (ALT 250 FOR IP): Performed by: INTERNAL MEDICINE

## 2022-10-10 RX ADMIN — ONDANSETRON 4 MG: 2 INJECTION INTRAMUSCULAR; INTRAVENOUS at 10:02

## 2022-10-10 RX ADMIN — LOSARTAN POTASSIUM 25 MG: 25 TABLET, FILM COATED ORAL at 09:14

## 2022-10-10 RX ADMIN — PANTOPRAZOLE SODIUM 40 MG: 40 TABLET, DELAYED RELEASE ORAL at 17:28

## 2022-10-10 RX ADMIN — ONDANSETRON 4 MG: 2 INJECTION INTRAMUSCULAR; INTRAVENOUS at 19:57

## 2022-10-10 RX ADMIN — CALCIUM CARBONATE 500 MG: 500 TABLET, CHEWABLE ORAL at 19:57

## 2022-10-10 RX ADMIN — PANTOPRAZOLE SODIUM 40 MG: 40 TABLET, DELAYED RELEASE ORAL at 09:14

## 2022-10-10 RX ADMIN — SODIUM CHLORIDE, PRESERVATIVE FREE 10 ML: 5 INJECTION INTRAVENOUS at 19:58

## 2022-10-10 RX ADMIN — ONDANSETRON 4 MG: 2 INJECTION INTRAMUSCULAR; INTRAVENOUS at 03:51

## 2022-10-10 RX ADMIN — ENOXAPARIN SODIUM 40 MG: 100 INJECTION SUBCUTANEOUS at 09:15

## 2022-10-10 ASSESSMENT — PAIN SCALES - GENERAL: PAINLEVEL_OUTOF10: 0

## 2022-10-10 NOTE — PROGRESS NOTES
Hospitalist Progress Note      SYNOPSIS: Patient admitted on 10/6/2022 for atyp chest pain fel to be 2/2  Hiatal hernia      SUBJECTIVE:  Experiencing food intolerance and regurgitation along with chest wall discomfort  Denies eye pain  Denies pruritus      Temp (24hrs), Av.5 °F (36.9 °C), Min:98.4 °F (36.9 °C), Max:98.5 °F (36.9 °C)    DIET: ADULT DIET; Regular  CODE: Full Code        OBJECTIVE:    BP (!) 143/87   Pulse 60   Temp 98.5 °F (36.9 °C) (Temporal)   Resp 16   Wt 172 lb (78 kg)   SpO2 96%   BMI 30.47 kg/m²     General appearance: Not jaundiced not diaphoretic  Eyes=  HEENT: No obvious swelling to lips or tongue  Neck:  Respiratory: Clear to auscultation bilaterally, unlabored respiratory pattern at rest  Cardiovascular:  Audible S1-S2 no lower extremity edema  Abdomen:  Musculoskeletal:   Skin: No petechiae or hives on inspection warm to palpation  Neurologic: awake, alert speech is clear        ASSESSMENT: Nausea/vomiting/food regurgitation    Hiatal hernia symptomatic  Ruled out for pneumonia  Ruled out for ACS     PLAN:    Upper GI  GI consult  Continue PPI  Enoxaparin for DVT prophylaxis  DISPOSITION: Patient was retrun to [facility]    Medications:  REVIEWED DAILY    Infusion Medications    sodium chloride       Scheduled Medications    pantoprazole  40 mg Oral BID AC    sodium chloride flush  10 mL IntraVENous 2 times per day    enoxaparin  40 mg SubCUTAneous Daily    losartan  25 mg Oral Daily     PRN Meds: calcium carbonate, sodium chloride flush, sodium chloride, promethazine **OR** ondansetron, polyethylene glycol, acetaminophen **OR** acetaminophen, hydrALAZINE    Labs:     Recent Labs     10/08/22  0448 10/09/22  0646 10/10/22  0440   WBC 5.9 6.5 6.3   HGB 13.5 13.9 13.2   HCT 42.3 44.1 41.4    203 209       Recent Labs     10/08/22  0448 10/09/22  0646 10/10/22  0440    139 144   K 4.2 3.7 3.8    104 109*   CO2 23 22 25   BUN 24* 23 25*   CREATININE 1.4* 1.3* 1.4* CALCIUM 9.2 8.7 9.1       Recent Labs     10/08/22  0448 10/09/22  0646 10/10/22  0440   PROT 6.5 6.9 6.5   ALKPHOS 50 53 46   ALT 13 16 16   AST 18 19 17   BILITOT 0.4 0.6 0.4       Radiology:   +++++++++++++++++++++++++++++++++++++++++++++++++  Prosper Fairly, DO  [address]  [facility] [address] [place] [address]  +++++++++++++++++++++++++++++++++++++++++++++++++  NOTE: This report was transcribed using voice recognition software. Every effort was made to ensure accuracy; however, inadvertent computerized transcription errors may be present.

## 2022-10-11 ENCOUNTER — APPOINTMENT (OUTPATIENT)
Dept: GENERAL RADIOLOGY | Age: 72
DRG: 254 | End: 2022-10-11
Payer: MEDICAID

## 2022-10-11 LAB
ALBUMIN SERPL-MCNC: 3.7 G/DL (ref 3.5–5.2)
ALP BLD-CCNC: 46 U/L (ref 40–129)
ALT SERPL-CCNC: 20 U/L (ref 0–40)
ANION GAP SERPL CALCULATED.3IONS-SCNC: 12 MMOL/L (ref 7–16)
AST SERPL-CCNC: 25 U/L (ref 0–39)
BASOPHILS ABSOLUTE: 0.04 E9/L (ref 0–0.2)
BASOPHILS RELATIVE PERCENT: 0.6 % (ref 0–2)
BILIRUB SERPL-MCNC: 0.5 MG/DL (ref 0–1.2)
BLOOD CULTURE, ROUTINE: NORMAL
BUN BLDV-MCNC: 21 MG/DL (ref 6–23)
CALCIUM SERPL-MCNC: 8.9 MG/DL (ref 8.6–10.2)
CHLORIDE BLD-SCNC: 110 MMOL/L (ref 98–107)
CO2: 24 MMOL/L (ref 22–29)
CREAT SERPL-MCNC: 1.5 MG/DL (ref 0.7–1.2)
CULTURE, BLOOD 2: NORMAL
EOSINOPHILS ABSOLUTE: 0.27 E9/L (ref 0.05–0.5)
EOSINOPHILS RELATIVE PERCENT: 4.1 % (ref 0–6)
GFR AFRICAN AMERICAN: 56
GFR NON-AFRICAN AMERICAN: 46 ML/MIN/1.73
GLUCOSE BLD-MCNC: 99 MG/DL (ref 74–99)
HCT VFR BLD CALC: 43 % (ref 37–54)
HEMOGLOBIN: 13.5 G/DL (ref 12.5–16.5)
IMMATURE GRANULOCYTES #: 0.01 E9/L
IMMATURE GRANULOCYTES %: 0.2 % (ref 0–5)
LYMPHOCYTES ABSOLUTE: 2.38 E9/L (ref 1.5–4)
LYMPHOCYTES RELATIVE PERCENT: 36.1 % (ref 20–42)
MCH RBC QN AUTO: 31.3 PG (ref 26–35)
MCHC RBC AUTO-ENTMCNC: 31.4 % (ref 32–34.5)
MCV RBC AUTO: 99.8 FL (ref 80–99.9)
MONOCYTES ABSOLUTE: 0.64 E9/L (ref 0.1–0.95)
MONOCYTES RELATIVE PERCENT: 9.7 % (ref 2–12)
NEUTROPHILS ABSOLUTE: 3.25 E9/L (ref 1.8–7.3)
NEUTROPHILS RELATIVE PERCENT: 49.3 % (ref 43–80)
PDW BLD-RTO: 13.9 FL (ref 11.5–15)
PLATELET # BLD: 211 E9/L (ref 130–450)
PMV BLD AUTO: 10.9 FL (ref 7–12)
POTASSIUM REFLEX MAGNESIUM: 4.2 MMOL/L (ref 3.5–5)
RBC # BLD: 4.31 E12/L (ref 3.8–5.8)
SODIUM BLD-SCNC: 146 MMOL/L (ref 132–146)
TOTAL PROTEIN: 6.5 G/DL (ref 6.4–8.3)
WBC # BLD: 6.6 E9/L (ref 4.5–11.5)

## 2022-10-11 PROCEDURE — 80053 COMPREHEN METABOLIC PANEL: CPT

## 2022-10-11 PROCEDURE — 6370000000 HC RX 637 (ALT 250 FOR IP): Performed by: FAMILY MEDICINE

## 2022-10-11 PROCEDURE — C9113 INJ PANTOPRAZOLE SODIUM, VIA: HCPCS | Performed by: INTERNAL MEDICINE

## 2022-10-11 PROCEDURE — 1200000000 HC SEMI PRIVATE

## 2022-10-11 PROCEDURE — 2580000003 HC RX 258: Performed by: FAMILY MEDICINE

## 2022-10-11 PROCEDURE — 85025 COMPLETE CBC W/AUTO DIFF WBC: CPT

## 2022-10-11 PROCEDURE — 36415 COLL VENOUS BLD VENIPUNCTURE: CPT

## 2022-10-11 PROCEDURE — 99252 IP/OBS CONSLTJ NEW/EST SF 35: CPT | Performed by: STUDENT IN AN ORGANIZED HEALTH CARE EDUCATION/TRAINING PROGRAM

## 2022-10-11 PROCEDURE — 6370000000 HC RX 637 (ALT 250 FOR IP): Performed by: INTERNAL MEDICINE

## 2022-10-11 PROCEDURE — 6360000002 HC RX W HCPCS: Performed by: FAMILY MEDICINE

## 2022-10-11 PROCEDURE — A4216 STERILE WATER/SALINE, 10 ML: HCPCS | Performed by: INTERNAL MEDICINE

## 2022-10-11 PROCEDURE — 74240 X-RAY XM UPR GI TRC 1CNTRST: CPT

## 2022-10-11 PROCEDURE — 2580000003 HC RX 258: Performed by: INTERNAL MEDICINE

## 2022-10-11 PROCEDURE — 6360000002 HC RX W HCPCS: Performed by: INTERNAL MEDICINE

## 2022-10-11 RX ORDER — SUCRALFATE 1 G/1
1 TABLET ORAL EVERY 6 HOURS SCHEDULED
Status: DISCONTINUED | OUTPATIENT
Start: 2022-10-11 | End: 2022-10-14 | Stop reason: HOSPADM

## 2022-10-11 RX ADMIN — PANTOPRAZOLE SODIUM 40 MG: 40 TABLET, DELAYED RELEASE ORAL at 05:20

## 2022-10-11 RX ADMIN — SODIUM CHLORIDE, PRESERVATIVE FREE 10 ML: 5 INJECTION INTRAVENOUS at 08:27

## 2022-10-11 RX ADMIN — LOSARTAN POTASSIUM 25 MG: 25 TABLET, FILM COATED ORAL at 08:27

## 2022-10-11 RX ADMIN — SODIUM CHLORIDE 40 MG: 9 INJECTION INTRAMUSCULAR; INTRAVENOUS; SUBCUTANEOUS at 15:43

## 2022-10-11 RX ADMIN — SODIUM CHLORIDE, PRESERVATIVE FREE 10 ML: 5 INJECTION INTRAVENOUS at 20:29

## 2022-10-11 RX ADMIN — HYDRALAZINE HYDROCHLORIDE 10 MG: 20 INJECTION INTRAMUSCULAR; INTRAVENOUS at 16:03

## 2022-10-11 RX ADMIN — CALCIUM CARBONATE 500 MG: 500 TABLET, CHEWABLE ORAL at 20:29

## 2022-10-11 RX ADMIN — SUCRALFATE 1 G: 1 TABLET ORAL at 15:43

## 2022-10-11 RX ADMIN — ENOXAPARIN SODIUM 40 MG: 100 INJECTION SUBCUTANEOUS at 08:28

## 2022-10-11 RX ADMIN — SUCRALFATE 1 G: 1 TABLET ORAL at 18:25

## 2022-10-11 ASSESSMENT — PAIN SCALES - GENERAL
PAINLEVEL_OUTOF10: 0
PAINLEVEL_OUTOF10: 0

## 2022-10-11 NOTE — PROGRESS NOTES
Hospitalist Progress Note      PCP: No primary care provider on file. Date of Admission: 10/6/2022  Days in the hospital: [address] [facility] Course:   Patient is 59-year-old male with history of osteoarthritis, hypertension who comes into hospital with complaints of chest pain, shortness of breath. Initially there was concern for pneumonia for which he was placed on empiric antibiotics. Pneumonia was ruled out. He also stress test done which did not show any evidence of ischemia. Repeat chest x-ray did not show any evidence of pneumonia. CT chest showed large hiatal hernia and patient continues to have dysphagia and so consultation was placed to GI. Subjective  Patient seen and examined at bedside. Denies any headache, dizziness. Chart reviewed, overnight events reviewed. Not able to tolerate diet. Exam:    BP (!) 155/89   Pulse 73   Temp 98.7 °F (37.1 °C) (Oral)   Resp 18   Wt 172 lb (78 kg)   SpO2 93%   BMI 30.47 kg/m²     HEENT: No pallor, no icterus. Respiratory:  CTA, good air entry. Cardiovascular: RRR, no murmur. Abdomen: Soft, non-tender, BS noted. Musculoskeletal: No joint pains or joint swelling noted. Neurologic: awake, alert and following commands       Assessment/Plan:    Dysphagia, continue with PPI, follow-up with GI, noted to have large hiatal hernia, will place him on IV Protonix and also start Carafate.     Hypertension, blood pressure controlled    Labs:   Recent Labs     10/09/22  0646 10/10/22  0440 10/11/22  0524   WBC 6.5 6.3 6.6   HGB 13.9 13.2 13.5   HCT 44.1 41.4 43.0    209 211     Recent Labs     10/09/22  0646 10/10/22  0440 10/11/22  0524    144 146   K 3.7 3.8 4.2    109* 110*   CO2 22 25 24   BUN 23 25* 21   CREATININE 1.3* 1.4* 1.5*   CALCIUM 8.7 9.1 8.9     Recent Labs     10/09/22  0646 10/10/22  0440 10/11/22  0524   AST 19 17 25   ALT 16 16 20   BILITOT 0.6 0.4 0.5   ALKPHOS 53 46 46     No results for input(s): INR in the last 72 hours. No results for input(s): Cristy Ill in the last 72 hours. Medications:  Reviewed    Infusion Medications    sodium chloride       Scheduled Medications    pantoprazole  40 mg Oral BID AC    sodium chloride flush  10 mL IntraVENous 2 times per day    enoxaparin  40 mg SubCUTAneous Daily    losartan  25 mg Oral Daily     PRN Meds: calcium carbonate, sodium chloride flush, sodium chloride, promethazine **OR** ondansetron, polyethylene glycol, acetaminophen **OR** acetaminophen, hydrALAZINE      Intake/Output Summary (Last 24 hours) at 10/11/2022 1000  Last data filed at 10/11/2022 0817  Gross per 24 hour   Intake 240 ml   Output 350 ml   Net -110 ml     Body mass index is 30.47 kg/m². Diet  ADULT DIET; Regular    Code Status  Full Code       Electronically signed by Sue Robertson MD on 10/11/2022 at 10:00 AM  [employer]   Please contact me through perfect serve    NOTE: This report was transcribed using voice recognition software. Every effort was made to ensure accuracy; however, inadvertent computerized transcription errors may be present.

## 2022-10-11 NOTE — CARE COORDINATION
10/11 Update CM note. GI consulted yesterday for hiatal hernia. Protonix 40 MG PO BID continues. Patient resides in [place] at [address] [facility] [phone]. His  is Jazmine Bucio at ext. [phone]. Please call Diana Mclean on discharge to arrange transportation back to [facility].     SYLWIA AlvarezN, RN  [facility] Case Management   Cell: [phone]

## 2022-10-12 ENCOUNTER — ANESTHESIA (OUTPATIENT)
Dept: ENDOSCOPY | Age: 72
DRG: 254 | End: 2022-10-12
Payer: MEDICAID

## 2022-10-12 LAB
ALBUMIN SERPL-MCNC: 4.2 G/DL (ref 3.5–5.2)
ALP BLD-CCNC: 52 U/L (ref 40–129)
ALT SERPL-CCNC: 29 U/L (ref 0–40)
ANION GAP SERPL CALCULATED.3IONS-SCNC: 13 MMOL/L (ref 7–16)
AST SERPL-CCNC: 26 U/L (ref 0–39)
BASOPHILS ABSOLUTE: 0.05 E9/L (ref 0–0.2)
BASOPHILS RELATIVE PERCENT: 0.7 % (ref 0–2)
BILIRUB SERPL-MCNC: 0.6 MG/DL (ref 0–1.2)
BUN BLDV-MCNC: 19 MG/DL (ref 6–23)
CALCIUM SERPL-MCNC: 9.4 MG/DL (ref 8.6–10.2)
CHLORIDE BLD-SCNC: 107 MMOL/L (ref 98–107)
CO2: 27 MMOL/L (ref 22–29)
CREAT SERPL-MCNC: 1.5 MG/DL (ref 0.7–1.2)
EOSINOPHILS ABSOLUTE: 0.25 E9/L (ref 0.05–0.5)
EOSINOPHILS RELATIVE PERCENT: 3.5 % (ref 0–6)
GFR AFRICAN AMERICAN: 56
GFR NON-AFRICAN AMERICAN: 46 ML/MIN/1.73
GLUCOSE BLD-MCNC: 101 MG/DL (ref 74–99)
HCT VFR BLD CALC: 48.8 % (ref 37–54)
HEMOGLOBIN: 15.1 G/DL (ref 12.5–16.5)
IMMATURE GRANULOCYTES #: 0.03 E9/L
IMMATURE GRANULOCYTES %: 0.4 % (ref 0–5)
LYMPHOCYTES ABSOLUTE: 2.35 E9/L (ref 1.5–4)
LYMPHOCYTES RELATIVE PERCENT: 32.6 % (ref 20–42)
MCH RBC QN AUTO: 30.7 PG (ref 26–35)
MCHC RBC AUTO-ENTMCNC: 30.9 % (ref 32–34.5)
MCV RBC AUTO: 99.2 FL (ref 80–99.9)
MONOCYTES ABSOLUTE: 0.65 E9/L (ref 0.1–0.95)
MONOCYTES RELATIVE PERCENT: 9 % (ref 2–12)
NEUTROPHILS ABSOLUTE: 3.88 E9/L (ref 1.8–7.3)
NEUTROPHILS RELATIVE PERCENT: 53.8 % (ref 43–80)
PDW BLD-RTO: 14 FL (ref 11.5–15)
PLATELET # BLD: 247 E9/L (ref 130–450)
PMV BLD AUTO: 10.9 FL (ref 7–12)
POTASSIUM REFLEX MAGNESIUM: 4.1 MMOL/L (ref 3.5–5)
RBC # BLD: 4.92 E12/L (ref 3.8–5.8)
SODIUM BLD-SCNC: 147 MMOL/L (ref 132–146)
TOTAL PROTEIN: 7.2 G/DL (ref 6.4–8.3)
WBC # BLD: 7.2 E9/L (ref 4.5–11.5)

## 2022-10-12 PROCEDURE — 36415 COLL VENOUS BLD VENIPUNCTURE: CPT

## 2022-10-12 PROCEDURE — 1200000000 HC SEMI PRIVATE

## 2022-10-12 PROCEDURE — A4216 STERILE WATER/SALINE, 10 ML: HCPCS | Performed by: INTERNAL MEDICINE

## 2022-10-12 PROCEDURE — 6360000002 HC RX W HCPCS: Performed by: INTERNAL MEDICINE

## 2022-10-12 PROCEDURE — 6370000000 HC RX 637 (ALT 250 FOR IP): Performed by: FAMILY MEDICINE

## 2022-10-12 PROCEDURE — 80053 COMPREHEN METABOLIC PANEL: CPT

## 2022-10-12 PROCEDURE — 6360000002 HC RX W HCPCS: Performed by: FAMILY MEDICINE

## 2022-10-12 PROCEDURE — 6370000000 HC RX 637 (ALT 250 FOR IP): Performed by: INTERNAL MEDICINE

## 2022-10-12 PROCEDURE — C9113 INJ PANTOPRAZOLE SODIUM, VIA: HCPCS | Performed by: INTERNAL MEDICINE

## 2022-10-12 PROCEDURE — 2580000003 HC RX 258: Performed by: INTERNAL MEDICINE

## 2022-10-12 PROCEDURE — 85025 COMPLETE CBC W/AUTO DIFF WBC: CPT

## 2022-10-12 PROCEDURE — 2580000003 HC RX 258: Performed by: FAMILY MEDICINE

## 2022-10-12 RX ORDER — SODIUM CHLORIDE 0.9 % (FLUSH) 0.9 %
5-40 SYRINGE (ML) INJECTION PRN
Status: DISCONTINUED | OUTPATIENT
Start: 2022-10-12 | End: 2022-10-14 | Stop reason: HOSPADM

## 2022-10-12 RX ORDER — SODIUM CHLORIDE 450 MG/100ML
INJECTION, SOLUTION INTRAVENOUS CONTINUOUS
Status: DISCONTINUED | OUTPATIENT
Start: 2022-10-12 | End: 2022-10-14 | Stop reason: HOSPADM

## 2022-10-12 RX ORDER — SODIUM CHLORIDE 9 MG/ML
25 INJECTION, SOLUTION INTRAVENOUS PRN
Status: DISCONTINUED | OUTPATIENT
Start: 2022-10-12 | End: 2022-10-14 | Stop reason: HOSPADM

## 2022-10-12 RX ORDER — SODIUM CHLORIDE 0.9 % (FLUSH) 0.9 %
5-40 SYRINGE (ML) INJECTION EVERY 12 HOURS SCHEDULED
Status: DISCONTINUED | OUTPATIENT
Start: 2022-10-12 | End: 2022-10-14 | Stop reason: HOSPADM

## 2022-10-12 RX ADMIN — ONDANSETRON 4 MG: 2 INJECTION INTRAMUSCULAR; INTRAVENOUS at 05:13

## 2022-10-12 RX ADMIN — ONDANSETRON 4 MG: 2 INJECTION INTRAMUSCULAR; INTRAVENOUS at 12:17

## 2022-10-12 RX ADMIN — ACETAMINOPHEN 650 MG: 325 TABLET ORAL at 09:22

## 2022-10-12 RX ADMIN — SODIUM CHLORIDE: 4.5 INJECTION, SOLUTION INTRAVENOUS at 20:04

## 2022-10-12 RX ADMIN — SODIUM CHLORIDE 40 MG: 9 INJECTION INTRAMUSCULAR; INTRAVENOUS; SUBCUTANEOUS at 15:44

## 2022-10-12 RX ADMIN — SODIUM CHLORIDE: 4.5 INJECTION, SOLUTION INTRAVENOUS at 12:30

## 2022-10-12 RX ADMIN — LOSARTAN POTASSIUM 25 MG: 25 TABLET, FILM COATED ORAL at 09:17

## 2022-10-12 RX ADMIN — ENOXAPARIN SODIUM 40 MG: 100 INJECTION SUBCUTANEOUS at 09:17

## 2022-10-12 RX ADMIN — SODIUM CHLORIDE, PRESERVATIVE FREE 10 ML: 5 INJECTION INTRAVENOUS at 09:17

## 2022-10-12 RX ADMIN — HYDRALAZINE HYDROCHLORIDE 10 MG: 20 INJECTION INTRAMUSCULAR; INTRAVENOUS at 03:10

## 2022-10-12 RX ADMIN — SODIUM CHLORIDE 40 MG: 9 INJECTION INTRAMUSCULAR; INTRAVENOUS; SUBCUTANEOUS at 03:09

## 2022-10-12 RX ADMIN — ONDANSETRON 4 MG: 2 INJECTION INTRAMUSCULAR; INTRAVENOUS at 18:26

## 2022-10-12 RX ADMIN — SUCRALFATE 1 G: 1 TABLET ORAL at 00:13

## 2022-10-12 ASSESSMENT — PAIN SCALES - GENERAL
PAINLEVEL_OUTOF10: 8
PAINLEVEL_OUTOF10: 0

## 2022-10-12 ASSESSMENT — PAIN DESCRIPTION - LOCATION: LOCATION: HEAD

## 2022-10-12 ASSESSMENT — PAIN DESCRIPTION - DESCRIPTORS: DESCRIPTORS: ACHING;THROBBING

## 2022-10-12 NOTE — PROGRESS NOTES
Hospitalist Progress Note      PCP: No primary care provider on file. Date of Admission: 10/6/2022  Days in the hospital: [address] [facility] [address] Course:   Patient is 69-year-old male with history of osteoarthritis, hypertension who comes into hospital with complaints of chest pain, shortness of breath. Initially there was concern for pneumonia for which he was placed on empiric antibiotics. Pneumonia was ruled out. He also stress test done which did not show any evidence of ischemia. Repeat chest x-ray did not show any evidence of pneumonia. CT chest showed large hiatal hernia and patient continues to have dysphagia and so consultation was placed to GI. Subjective  Patient seen and examined at bedside. Still complaining of some nausea. Chart review, overnight events reviewed. Awaiting GI evaluation. Exam:    BP (!) 162/68   Pulse 86   Temp 97.9 °F (36.6 °C) (Oral)   Resp 18   Wt 172 lb (78 kg)   SpO2 93%   BMI 30.47 kg/m²     HEENT: No pallor, no icterus. Respiratory:  CTA, good air entry. Cardiovascular: RRR, no murmur. Abdomen: Soft, non-tender, BS noted. Musculoskeletal: No joint pains or joint swelling noted. Neurologic: awake, alert and following commands         Assessment/Plan:     Dysphagia, continue with PPI, awaiting GI evaluation, noted to have large hiatal hernia, continue Carafate and IV PPI.     Hypertension, blood pressure controlled    Mild hypernatremia, start half-normal saline      Labs:   Recent Labs     10/10/22  0440 10/11/22  0524 10/12/22  0501   WBC 6.3 6.6 7.2   HGB 13.2 13.5 15.1   HCT 41.4 43.0 48.8    211 247     Recent Labs     10/10/22  0440 10/11/22  0524 10/12/22  0501    146 147*   K 3.8 4.2 4.1   * 110* 107   CO2 25 24 27   BUN 25* 21 19   CREATININE 1.4* 1.5* 1.5*   CALCIUM 9.1 8.9 9.4     Recent Labs     10/10/22  0440 10/11/22  0524 10/12/22  0501   AST 17 25 26   ALT 16 20 29   BILITOT 0.4 0.5 0.6   ALKPHOS 46 46 52     No results for input(s): INR in the last 72 hours. No results for input(s): Chiki Gubler in the last 72 hours. Medications:  Reviewed    Infusion Medications    sodium chloride       Scheduled Medications    pantoprazole (PROTONIX) 40 mg injection  40 mg IntraVENous Q12H    sucralfate  1 g Oral 4 times per day    sodium chloride flush  10 mL IntraVENous 2 times per day    enoxaparin  40 mg SubCUTAneous Daily    losartan  25 mg Oral Daily     PRN Meds: calcium carbonate, sodium chloride flush, sodium chloride, promethazine **OR** ondansetron, polyethylene glycol, acetaminophen **OR** acetaminophen, hydrALAZINE      Intake/Output Summary (Last 24 hours) at 10/12/2022 1210  Last data filed at 10/12/2022 1042  Gross per 24 hour   Intake 360 ml   Output 350 ml   Net 10 ml     Body mass index is 30.47 kg/m². Diet  ADULT DIET; Regular    Code Status  Full Code       Electronically signed by Alison Mendoza MD on 10/12/2022 at 12:10 PM  [employer]   Please contact me through perfect serve    NOTE: This report was transcribed using voice recognition software. Every effort was made to ensure accuracy; however, inadvertent computerized transcription errors may be present.

## 2022-10-12 NOTE — CONSULTS
[facility] ([facility])   Gastroenterology, Hepatology, &  Advanced Endoscopy    Consult       ASSESSMENT AND PLAN:    71y/M w/ history of esophageal dysmotility, reflux, and symptomatic hiatal hernia who presents w/ chest discomfort and decreased ability to tolerate PO intake. PLAN:  -Cont PPI BID and carafate.  -Will proceed with EGD for evaluation and treatment.  -Would place on soft diet  -Will require outpatient esophageal manometry     I will follow. Thank you for including us in the care of this patient. Please do not hesitate to contact us with any additional questions or concerns. Jordan Alexander MD  Gastroenterology/Hepatology  Advanced Endoscopy      HISTORY OF PRESENT ILLNESS:      Mr. Emanuel Gore is a 71y/M who presented to the ED w/ CP and SOB. CXR on admission showed changes of L perihilar LLL airspace disease. The patient has a longstanding history of dysphagia and GERD in the setting of a large hiatal hernia. He reports being evaluated at a [place] [facility] and having several EGDs with dilation. He also reports being told that he should be referred to a surgeon for hiatal hernia repair. He has been tolerating clears but also has been having regurgitation at times. He underwent barium esophagram which showed diffuse distention of the esophagus, esophageal dysmotility, and moderate-sized hiatal hernia with associated reflux. He has been placed on PPI BID therapy and carafate. He is requesting inpatient endoscopic evaluation and dilation. Past Medical History:        Diagnosis Date    Arthritis     Prostate disorder      Past Surgical History:        Procedure Laterality Date    ESOPHAGEAL DILATATION       Social History:    TOBACCO:   reports that he has never smoked. He has never used smokeless tobacco.  ETOH:   reports that he does not currently use alcohol. DRUGS:   reports no history of drug use. Family History:   History reviewed. No pertinent family history.       Current Facility-Administered Medications:     pantoprazole (PROTONIX) 40 mg in sodium chloride (PF) 10 mL injection, 40 mg, IntraVENous, Q12H, Dino Davis MD, 40 mg at 10/11/22 1543    sucralfate (CARAFATE) tablet 1 g, 1 g, Oral, 4 times per day, Dina Woo MD, 1 g at 10/11/22 1825    calcium carbonate (TUMS) chewable tablet 500 mg, 500 mg, Oral, TID PRN, Jenny Samuel MD, 500 mg at 10/10/22 1957    sodium chloride flush 0.9 % injection 10 mL, 10 mL, IntraVENous, 2 times per day, [facility], DO, 10 mL at 10/11/22 0827    sodium chloride flush 0.9 % injection 10 mL, 10 mL, IntraVENous, PRN, [facility]    0.9 % sodium chloride infusion, , IntraVENous, PRN, [facility],     enoxaparin (LOVENOX) injection 40 mg, 40 mg, SubCUTAneous, Daily, [facility] DO, 40 mg at 10/11/22 8418    promethazine (PHENERGAN) tablet 12.5 mg, 12.5 mg, Oral, Q6H PRN **OR** ondansetron (ZOFRAN) injection 4 mg, 4 mg, IntraVENous, Q6H PRN, [facility], DO, 4 mg at 10/10/22 1957    polyethylene glycol (GLYCOLAX) packet 17 g, 17 g, Oral, Daily PRN, [facility]    acetaminophen (TYLENOL) tablet 650 mg, 650 mg, Oral, Q6H PRN, 650 mg at 10/08/22 2028 **OR** acetaminophen (TYLENOL) suppository 650 mg, 650 mg, Rectal, Q6H PRN, [facility]    losartan (COZAAR) tablet 25 mg, 25 mg, Oral, Daily, [facility], DO, 25 mg at 10/11/22 0827    hydrALAZINE (APRESOLINE) injection 10 mg, 10 mg, IntraVENous, Q4H PRN, [facility], 10 mg at 10/11/22 1603     Allergies:  Patient has no known allergies. ROS:  General: Patient denies n/v/f/c or weight loss. HEENT: Patient denies persistent postnasal drip, scleral icterus, drooling, persistent bleeding from nose/mouth. Resp: Patient denies wheezing, productive cough. He had SOB on admission. Cards: Patient denies palpitations, significant edema. He had CP on admission. GI: As above. Derm: Patient denies jaundice/rashes.    Musc: Patient denies diffuse/irregular joint swelling or myalgias. PHYSICAL EXAM:  BP (!) 153/88   Pulse 99   Temp 98.7 °F (37.1 °C) (Oral)   Resp 18   Wt 172 lb (78 kg)   SpO2 97%   BMI 30.47 kg/m²   Physical Exam:  General: Overall well-appearing, NAD  HEENT: PERRLA, EOMI, Anicteric sclera, MMM, no rhinorrhea  Cards: RRR, no LE edema  Resp: Breathing comfortably on room air, good air movement, no use of accessory muscles, no audible wheezing  Abdomen: soft, ND. Tender in epigastrium. Extremities: Moves all extremities, no effusions or bruising.   Skin: No rashes or jaundice  Neuro: A&O x 3, CN grossly intact, non-focal exam               Electronically signed by Betty Kennedy MD on 10/11/2022 at 8:24 PM

## 2022-10-12 NOTE — CARE COORDINATION
10/12 Update CM note. Incomplete GI note in chart but no current recommendations. Carafate 1G PO QID and Protonix 40 MG IV Q12H started yesterday per primary. UGI with KUB completed yesterday was a severely limited exam with diffuse distension of the esophagus, severe esophageal dysmotility and a moderate-sized hiatal hernia with moderate to severe gastroesophageal reflux. [facility] with pt's , Rylee Rodriguez via phone @ [phone] regarding update on pt. There is concerns regarding pt having declining health and not being able to return to [facility]. Crystal Yanez provided CM with pt's CM at [facility] contact information, her name is Murali Garrido, [phone], ext. [phone] and her email is [email or web address]. She voiced concerns regarding pt not tolerating liquids or solids. Discussed findings of hiatal hernia and awaiting GI recs. PT/OT evals are ordered to clear pt physically. Both Crystal Yanez and Slade need updated to ensure pt can return to [facility].     Joy Carbajal, BSN, RN  [facility] Case Management   Cell: [phone]

## 2022-10-13 ENCOUNTER — ANESTHESIA EVENT (OUTPATIENT)
Dept: ENDOSCOPY | Age: 72
DRG: 254 | End: 2022-10-13
Payer: MEDICAID

## 2022-10-13 PROBLEM — R13.10 DYSPHAGIA: Status: ACTIVE | Noted: 2022-10-06

## 2022-10-13 LAB
ALBUMIN SERPL-MCNC: 3.4 G/DL (ref 3.5–5.2)
ALP BLD-CCNC: 43 U/L (ref 40–129)
ALT SERPL-CCNC: 19 U/L (ref 0–40)
ANION GAP SERPL CALCULATED.3IONS-SCNC: 10 MMOL/L (ref 7–16)
AST SERPL-CCNC: 18 U/L (ref 0–39)
BASOPHILS ABSOLUTE: 0.03 E9/L (ref 0–0.2)
BASOPHILS RELATIVE PERCENT: 0.5 % (ref 0–2)
BILIRUB SERPL-MCNC: 0.7 MG/DL (ref 0–1.2)
BUN BLDV-MCNC: 16 MG/DL (ref 6–23)
CALCIUM SERPL-MCNC: 8.8 MG/DL (ref 8.6–10.2)
CHLORIDE BLD-SCNC: 106 MMOL/L (ref 98–107)
CO2: 25 MMOL/L (ref 22–29)
CREAT SERPL-MCNC: 1.5 MG/DL (ref 0.7–1.2)
EOSINOPHILS ABSOLUTE: 0.3 E9/L (ref 0.05–0.5)
EOSINOPHILS RELATIVE PERCENT: 4.6 % (ref 0–6)
GFR AFRICAN AMERICAN: 56
GFR NON-AFRICAN AMERICAN: 46 ML/MIN/1.73
GLUCOSE BLD-MCNC: 102 MG/DL (ref 74–99)
HCT VFR BLD CALC: 41.5 % (ref 37–54)
HEMOGLOBIN: 13.3 G/DL (ref 12.5–16.5)
IMMATURE GRANULOCYTES #: 0.02 E9/L
IMMATURE GRANULOCYTES %: 0.3 % (ref 0–5)
LYMPHOCYTES ABSOLUTE: 1.89 E9/L (ref 1.5–4)
LYMPHOCYTES RELATIVE PERCENT: 29 % (ref 20–42)
MCH RBC QN AUTO: 30.7 PG (ref 26–35)
MCHC RBC AUTO-ENTMCNC: 32 % (ref 32–34.5)
MCV RBC AUTO: 95.8 FL (ref 80–99.9)
MONOCYTES ABSOLUTE: 0.57 E9/L (ref 0.1–0.95)
MONOCYTES RELATIVE PERCENT: 8.7 % (ref 2–12)
NEUTROPHILS ABSOLUTE: 3.71 E9/L (ref 1.8–7.3)
NEUTROPHILS RELATIVE PERCENT: 56.9 % (ref 43–80)
PDW BLD-RTO: 13.6 FL (ref 11.5–15)
PLATELET # BLD: 201 E9/L (ref 130–450)
PMV BLD AUTO: 10.8 FL (ref 7–12)
POTASSIUM REFLEX MAGNESIUM: 3.7 MMOL/L (ref 3.5–5)
POTASSIUM SERPL-SCNC: 3.7 MMOL/L (ref 3.5–5)
RBC # BLD: 4.33 E12/L (ref 3.8–5.8)
SODIUM BLD-SCNC: 141 MMOL/L (ref 132–146)
TOTAL PROTEIN: 6.4 G/DL (ref 6.4–8.3)
WBC # BLD: 6.5 E9/L (ref 4.5–11.5)

## 2022-10-13 PROCEDURE — 0DJ08ZZ INSPECTION OF UPPER INTESTINAL TRACT, VIA NATURAL OR ARTIFICIAL OPENING ENDOSCOPIC: ICD-10-PCS | Performed by: STUDENT IN AN ORGANIZED HEALTH CARE EDUCATION/TRAINING PROGRAM

## 2022-10-13 PROCEDURE — 85025 COMPLETE CBC W/AUTO DIFF WBC: CPT

## 2022-10-13 PROCEDURE — 2709999900 HC NON-CHARGEABLE SUPPLY: Performed by: STUDENT IN AN ORGANIZED HEALTH CARE EDUCATION/TRAINING PROGRAM

## 2022-10-13 PROCEDURE — A4216 STERILE WATER/SALINE, 10 ML: HCPCS | Performed by: INTERNAL MEDICINE

## 2022-10-13 PROCEDURE — 7100000000 HC PACU RECOVERY - FIRST 15 MIN: Performed by: STUDENT IN AN ORGANIZED HEALTH CARE EDUCATION/TRAINING PROGRAM

## 2022-10-13 PROCEDURE — 80053 COMPREHEN METABOLIC PANEL: CPT

## 2022-10-13 PROCEDURE — 80048 BASIC METABOLIC PNL TOTAL CA: CPT

## 2022-10-13 PROCEDURE — 6370000000 HC RX 637 (ALT 250 FOR IP): Performed by: INTERNAL MEDICINE

## 2022-10-13 PROCEDURE — 7100000001 HC PACU RECOVERY - ADDTL 15 MIN: Performed by: STUDENT IN AN ORGANIZED HEALTH CARE EDUCATION/TRAINING PROGRAM

## 2022-10-13 PROCEDURE — 97535 SELF CARE MNGMENT TRAINING: CPT

## 2022-10-13 PROCEDURE — 3700000000 HC ANESTHESIA ATTENDED CARE: Performed by: STUDENT IN AN ORGANIZED HEALTH CARE EDUCATION/TRAINING PROGRAM

## 2022-10-13 PROCEDURE — 97161 PT EVAL LOW COMPLEX 20 MIN: CPT

## 2022-10-13 PROCEDURE — 2580000003 HC RX 258

## 2022-10-13 PROCEDURE — 2580000003 HC RX 258: Performed by: STUDENT IN AN ORGANIZED HEALTH CARE EDUCATION/TRAINING PROGRAM

## 2022-10-13 PROCEDURE — 97165 OT EVAL LOW COMPLEX 30 MIN: CPT

## 2022-10-13 PROCEDURE — 6360000002 HC RX W HCPCS: Performed by: INTERNAL MEDICINE

## 2022-10-13 PROCEDURE — 6360000002 HC RX W HCPCS

## 2022-10-13 PROCEDURE — 2500000003 HC RX 250 WO HCPCS

## 2022-10-13 PROCEDURE — C9113 INJ PANTOPRAZOLE SODIUM, VIA: HCPCS | Performed by: INTERNAL MEDICINE

## 2022-10-13 PROCEDURE — 97530 THERAPEUTIC ACTIVITIES: CPT

## 2022-10-13 PROCEDURE — 36415 COLL VENOUS BLD VENIPUNCTURE: CPT

## 2022-10-13 PROCEDURE — 2580000003 HC RX 258: Performed by: INTERNAL MEDICINE

## 2022-10-13 PROCEDURE — 6360000002 HC RX W HCPCS: Performed by: FAMILY MEDICINE

## 2022-10-13 PROCEDURE — 43235 EGD DIAGNOSTIC BRUSH WASH: CPT | Performed by: STUDENT IN AN ORGANIZED HEALTH CARE EDUCATION/TRAINING PROGRAM

## 2022-10-13 PROCEDURE — 1200000000 HC SEMI PRIVATE

## 2022-10-13 PROCEDURE — 3609017100 HC EGD: Performed by: STUDENT IN AN ORGANIZED HEALTH CARE EDUCATION/TRAINING PROGRAM

## 2022-10-13 PROCEDURE — 3700000001 HC ADD 15 MINUTES (ANESTHESIA): Performed by: STUDENT IN AN ORGANIZED HEALTH CARE EDUCATION/TRAINING PROGRAM

## 2022-10-13 RX ORDER — PROPOFOL 10 MG/ML
INJECTION, EMULSION INTRAVENOUS PRN
Status: DISCONTINUED | OUTPATIENT
Start: 2022-10-13 | End: 2022-10-13 | Stop reason: SDUPTHER

## 2022-10-13 RX ORDER — LIDOCAINE HYDROCHLORIDE 20 MG/ML
INJECTION, SOLUTION INTRAVENOUS PRN
Status: DISCONTINUED | OUTPATIENT
Start: 2022-10-13 | End: 2022-10-13 | Stop reason: SDUPTHER

## 2022-10-13 RX ORDER — LABETALOL HYDROCHLORIDE 5 MG/ML
INJECTION, SOLUTION INTRAVENOUS PRN
Status: DISCONTINUED | OUTPATIENT
Start: 2022-10-13 | End: 2022-10-13 | Stop reason: SDUPTHER

## 2022-10-13 RX ORDER — SODIUM CHLORIDE 9 MG/ML
INJECTION, SOLUTION INTRAVENOUS CONTINUOUS PRN
Status: DISCONTINUED | OUTPATIENT
Start: 2022-10-13 | End: 2022-10-13 | Stop reason: SDUPTHER

## 2022-10-13 RX ADMIN — HYDRALAZINE HYDROCHLORIDE 10 MG: 20 INJECTION INTRAMUSCULAR; INTRAVENOUS at 09:00

## 2022-10-13 RX ADMIN — LABETALOL HYDROCHLORIDE 5 MG: 5 INJECTION INTRAVENOUS at 16:32

## 2022-10-13 RX ADMIN — SODIUM CHLORIDE: 9 INJECTION, SOLUTION INTRAVENOUS at 16:24

## 2022-10-13 RX ADMIN — LIDOCAINE HYDROCHLORIDE 100 MG: 20 INJECTION, SOLUTION INTRAVENOUS at 16:28

## 2022-10-13 RX ADMIN — LABETALOL HYDROCHLORIDE 10 MG: 5 INJECTION INTRAVENOUS at 16:38

## 2022-10-13 RX ADMIN — SODIUM CHLORIDE: 4.5 INJECTION, SOLUTION INTRAVENOUS at 09:01

## 2022-10-13 RX ADMIN — SUCRALFATE 1 G: 1 TABLET ORAL at 17:55

## 2022-10-13 RX ADMIN — PROPOFOL 150 MG: 10 INJECTION, EMULSION INTRAVENOUS at 16:38

## 2022-10-13 RX ADMIN — SODIUM CHLORIDE 40 MG: 9 INJECTION INTRAMUSCULAR; INTRAVENOUS; SUBCUTANEOUS at 14:12

## 2022-10-13 RX ADMIN — SODIUM CHLORIDE 40 MG: 9 INJECTION INTRAMUSCULAR; INTRAVENOUS; SUBCUTANEOUS at 03:23

## 2022-10-13 RX ADMIN — SODIUM CHLORIDE, PRESERVATIVE FREE 10 ML: 5 INJECTION INTRAVENOUS at 19:22

## 2022-10-13 ASSESSMENT — LIFESTYLE VARIABLES: SMOKING_STATUS: 0

## 2022-10-13 ASSESSMENT — PAIN SCALES - GENERAL
PAINLEVEL_OUTOF10: 0

## 2022-10-13 ASSESSMENT — ENCOUNTER SYMPTOMS
DYSPNEA ACTIVITY LEVEL: AFTER AMBULATING 1 FLIGHT OF STAIRS
SHORTNESS OF BREATH: 1

## 2022-10-13 NOTE — BRIEF OP NOTE
Brief Postoperative Note      Patient: Bailee Chatman  YOB: 1950  MRN: [id number]    Date of Procedure: 10/13/2022    Pre-Op Diagnosis: Dysphagia [R13.10]    Post-Op Diagnosis: Esophagitis, Large Hiatal Hernia       Procedure(s):  EGD ESOPHAGOGASTRODUODENOSCOPY    Surgeon(s):  Chiam Hung MD    Assistant:  * No surgical staff found *    Anesthesia: Monitor Anesthesia Care    Estimated Blood Loss (mL): Minimal    Complications: None    Specimens:   * No specimens in log *    Implants:  * No implants in log *      Drains: * No LDAs found *    Findings:     Distal LA Grade C Esophagitis. Irregular Z line at 30cm. Large hiatal hernia containing food with gastric pinch at 40cm. Fibrous food debris in the fundus. Normal stomach. Normal duodenum. Recommendations:  -Continue PPI BID. -Soft. Low-fat, low-fiber diet.  -Will need outpatient manometry study.     Electronically signed by Chaim Hung MD on 10/13/2022 at 4:49 PM

## 2022-10-13 NOTE — PROGRESS NOTES
Physical Therapy  Physical Therapy Initial Assessment     Name: Angel Chaidez  : 1950  MRN: [id number]      Date of Service: 10/13/2022    Evaluating PT:  Alecia Erazo PT, DPT [id number]    Room #:  2734/8797-W  Diagnosis:  Pneumonia of left lower lobe due to infectious organism [J18.9]  Community acquired pneumonia of both lower lobes [J18.9]  PMHx/PSHx:    Past Medical History:   Diagnosis Date    Arthritis     Prostate disorder      Precautions:  Fall risk, Alarms, IV, Dysphagia  Equipment Needs:  TBD    SUBJECTIVE:    Pt resides at [facility]. Pt ambulated with rollator PTA. Equipment Owned: rollator, cane    OBJECTIVE:   Initial Evaluation  Date: 10/13/22 Treatment Short Term/ Long Term   Goals   AM-PAC 6 Clicks      Was pt agreeable to Eval/treatment? Yes     Does pt have pain? No pain     Bed Mobility  Rolling: SBA  Supine to sit: SBA  Sit to supine: SBA  Scooting: SBA  Independent    Transfers Sit to stand: Min A from bed and commode  Stand to sit: Min A  Stand pivot: Min A with rollator  Sit to stand: Independent  Stand to sit: Independent  Stand pivot: Mod I   Ambulation    40 x2feet with rollator Min A  Standing rest break  >200 feet with rollator Mod I   Stair negotiation: ascended and descended  NT  2 steps with unilateral rail independent (community access)   ROM BUE:  WFL  BLE:  WFL PROM, L LE knee extension limited     Strength BUE:  Refer to OT  BLE:  4-/5 bilaterally  4/5   Balance Sitting EOB:  SBA  Dynamic Standing:  Min A with rollator  Sitting EOB:  Independent  Dynamic Standing:   Mod I     Pt is A & O x 4  Sensation:  Pt denies numbness and tingling to extremities  Edema:  unremarkable   Vitals: HR 88-98, SpO2 94-98%    Patient education  Pt educated on role and benefits of physical therapy, safety and technique for mobility    Patient response to education:   Pt verbalized understanding Pt demonstrated skill Pt requires further education in this area   x x x     ASSESSMENT:    Conditions Requiring Skilled Therapeutic Intervention:    [x]Decreased strength     [x]Decreased ROM  [x]Decreased functional mobility  [x]Decreased balance   [x]Decreased endurance   []Decreased posture  []Decreased sensation  []Decreased coordination   []Decreased vision  [x]Decreased safety awareness   []Increased pain       Comments:  Patient deemed medically stable prior to therapy evaluation. Patient was supine in bed upon therapy arrival and provided consent to evaluation. He performed all activities with great effort of independence. Assistance provided for stability with gait, but no LOB noted. Deficits observed during ambulation include: decreased mila, poor proximity to rollator, and decreased foot clearance. Patient left in chair position with all needs met and call light in reach for safety. Pt would benefit from skilled PT post DC in order to improve function. Treatment:  Patient practiced and was instructed in the following treatment:    Bed mobility training - pt given verbal and tactile cues to facilitate proper sequencing and safety during rolling and supine>sit as well as provided with physical assistance to complete task   Sitting EOB for >10 minutes for upright tolerance, postural awareness and BLE ROM  Transfer training - pt was given verbal and tactile cues to facilitate proper hand placement, technique and safety during sit to stand and stand to sit as well as provided with physical assistance. Gait training- pt was given verbal and tactile cues to facilitate appropriate use of rollator during ambulation as well as provided with physical assistance. Pt's/ family goals   1. Return to PLOF    Prognosis is good for reaching above PT goals. Patient and or family understand(s) diagnosis, prognosis, and plan of care.   Yes    PHYSICAL THERAPY PLAN OF CARE:    PT POC is established based on physician order and patient diagnosis     Referring provider/PT Order:     PT eval and treat  Start:  10/12/22 1100,   End:  10/12/22 1100,   ONE TIME,   Standing Count:  1 Occurrences,   Ying Chávez MD     Diagnosis:  Pneumonia of left lower lobe due to infectious organism [J18.9]  Community acquired pneumonia of both lower lobes [J18.9]  Specific instructions for next treatment:  progress mobility, increase ambulatory distance    Current Treatment Recommendations:     [x] Strengthening to improve independence with functional mobility   [] ROM to improve independence with functional mobility   [x] Balance Training to improve static/dynamic balance and to reduce fall risk  [x] Endurance Training to improve activity tolerance during functional mobility   [x] Transfer Training to improve safety and independence with all functional transfers   [x] Gait Training to improve gait mechanics, endurance and assess need for appropriate assistive device  [] Stair Training in preparation for safe discharge home and/or into the community   [] Positioning to prevent skin breakdown and contractures  [x] Safety and Education Training   [x] Patient/Caregiver Education   [] HEP  [] Other     PT long term treatment goals are located in above grid    Frequency of treatments: 2-5x/week x 1-2 weeks. Time in  1525  Time out  1603    Total Treatment Time  23 minutes     Evaluation Time includes thorough review of current medical information, gathering information on past medical history/social history and prior level of function, completion of standardized testing/informal observation of tasks, assessment of data and education on plan of care and goals.     CPT codes:  [x] Low Complexity PT evaluation 58508  [] Moderate Complexity PT evaluation 70134  [] High Complexity PT evaluation 14286  [] PT Re-evaluation 53075  [] Gait training 12548 - minutes  [] Manual therapy 63610 - minutes  [x] Therapeutic activities 27201 23 minutes  [] Therapeutic exercises 15582 - minutes  [] Neuromuscular reeducation 16097 - minutes     Nabila Nath, PT, DPT [id number]

## 2022-10-13 NOTE — PROGRESS NOTES
[address]:                                                  Patient Name: Allyson Gatica    MRN: [id number]    : 1950    Room: [address] [place] [address]      Evaluating OT: Belen Whitmore, [address] OTR/L; [id number]      Referring Provider: Morenita Maurer MD    Specific Provider Orders/Date: OT Eval and Treat 10/12/22      Diagnosis: Hiatal Hernia     Surgery: none this session     Pertinent Medical History:  has a past medical history of Arthritis and Prostate disorder.       Reason for Admission: fatigue dry throat, sore throat, cough, headache, SOB     Recommended Adaptive Equipment:  TBD pending progression/discharge     Precautions:  Fall Risk, IV, Bed Alarm, Dysphagia     Assessment of current deficits:    [x] Functional mobility  [x]ADLs  [x] Strength               [x]Cognition    [x] Functional transfers   [x] IADLs         [x] Safety Awareness   [x]Endurance    [x] Fine Coordination              [x] Balance      [] Vision/perception   []Sensation     []Gross Motor Coordination  [] ROM  [] Delirium                   [] Motor Control     OT PLAN OF CARE   OT POC based on physician orders, patient diagnosis and results of clinical assessment    Frequency/Duration: 2-3 days/wk for 2 weeks PRN   Specific OT Treatment Interventions to include:   * Instruction/training on adapted ADL techniques and AE recommendations to increase functional independence within precautions       * Training on energy conservation strategies, correct breathing pattern and techniques to improve independence/tolerance for self-care routine  * Functional transfer/mobility training/DME recommendations for increased independence, safety, and fall prevention  * Patient/Family education to increase follow through with safety techniques and functional independence  * Recommendation of environmental modifications for increased safety with functional transfers/mobility and ADLs  * Therapeutic exercise to improve motor endurance, ROM, and functional strength for ADLs/functional transfers  * Therapeutic activities to facilitate/challenge dynamic balance, stand tolerance for increased safety and independence with ADLs    Home Living: Pt admitted from [address] [place] [address]. Bathroom setup:  ?  Equipment owned: rollator    Prior Level of Function: IND with ADLs    assist with IADLs  Ambulated with no AD  Driving: no - [facility] provides transportation  Occupation: none stated    Pain Level: 0/10  Cognition: A&O: 4/4  Follows multi step directions   Memory:  good   Sequencing:  fair   Problem solving:  fair   Judgement/safety:  fair     Functional Assessment:  AM-PAC Daily Activity Raw Score: 16/24   Initial Eval Status  Date: 10/13/22 Treatment Status  Date: STGs = LTGs  Time frame: 10-14 days   Feeding Stand by Assist   Tray set up  Independent    Grooming Stand by Assist   Standing sink side for hand hygiene  Independent    UB Dressing Minimal Assist   Macario/doff gown seated EOB  Independent    LB Dressing Moderate Assist   Macario pants seated EOB   Assist to threading  Independent    Bathing Moderate Assist  Would benefit from use of shower chair upon returning home  Independent    Toileting Minimal Assist   Dynamic standing for urine movement  Independent    Bed Mobility  Log Roll: SBA  Supine to sit: Stand by Assist   Sit to supine: Stand by Assist   Supine to sit: Independent   Sit to supine: Independent    Functional Transfers Sit to stand: Min A   Stand to sit: Min A  Stand pivot: Min A  Commode: Min A  Sit to stand: IND  Stand to sit:IND  Stand pivot: IND  Commode: IND    Functional Mobility Min A with ww   within household distance  Mod Ind with ww    Balance Sitting:     Static - SBA     Dynamic - SBA  Standing: Min A with ww  Sitting:  Static: IND  Dynamic: IND  Standing:  Mod Ind with ww   Activity Tolerance FAIR  Limited d/t overall fatigue and SOB with prolonged activity  GOOD   Visual/  Perceptual Glasses: YES        Vitals   SpO2 at rest 98%  SpO2 with functional mobility 94%  SpO2 end of session 98%         BUE  ROM/Strength/  Fine motor Coordination Hand dominance: Right     RUE: ROM WFL     Strength: 4-/5      Strength: FAIR     Coordination:  FAIR     LUE: ROM WFL     Strength: 4-/5      Strength: FAIR     Coordination:  FAIR  increase BUE muscle strength for improved indep with functional transfers       Fine Motor Coordination:  finger to nose assessment appears WFL  Hearing: WFL   Sensation:  No c/o numbness or tingling   Tone: WFL   Edema: unremarkable    Patient/Family Goal: pt plan to go home   Based on patient's functional performance as stated below and level of assistance needed prior to admission, this therapist believes that the patient would benefit from further skilled OT following hospital stay in an effort to increase safety, functional independence, and quality of life. Comment: Cleared by RN to see pt. Upon arrival patient lying supine in bed and agreeable to OT session. At end of session, patient lying supine in bed with call light and phone within reach, all lines and tubes intact. Overall patient demonstrated  decreased independence and safety during completion of ADL/functional transfer/mobility tasks. Pt would benefit from continued skilled OT to increase safety and independence with completion of ADL/IADL tasks for functional independence and quality of life. Treatment: Pt required vc's for proper technique/safety with hand placement/body mechanics/posture for bed mobility/ADLs/functional tranfers/mobility/ww management. Pt required vc's for sequencing/initiation of ADLs/functional transfers. Pt able to  sit EOB ~10 mins and at sink ~2 mins to increase core strength/balance/activity tolerance for ease with ADLs.  Pt required increased time to complete ADLs/functional transfers due to management of multiple lines/overall fatigue. Pt required skilled monitoring of SpO2/HR during session and education on energy conservation techniques. Pt required rest breaks during session and educated on pursed lip breathing. Pt appeared to have tolerated session well and appears motivated/cooperative/pleasant. Pt instructed on use of call light for assistance and fall prevention. Pt demo'ing fair understanding of education provided. Continue to educate. Rehab Potential: Good  for established goals       LTG: maximize independence with ADLs to return to PLOF    Patient and/or family were instructed on functional diagnosis, prognosis/goals and OT plan of care. Demonstrated FAIR understanding. [] Malnutrition indicators have been identified and nursing has been notified to ensure a dietitian consult is ordered. Eval Complexity: Low    Evaluation time includes thorough review of current medical information, gathering information on past medical & social history & PLOF, completion of standardized testing, informal observation of tasks, consultation with other medical professions/disciplines, assessment of data & development of POC/goals. Time In: 1525  Time Out: 1603  Total Treatment Time: 23    Min Units   OT Eval Low 81728  X     OT Eval Medium 87631      OT Eval High 53837      OT Re-Eval [id number]       Therapeutic Ex 06191       Therapeutic Activities 80475  10  1   ADL/Self Care 49813  13  1   Orthotic Management 84094       Manual 07557     Neuro Re-Ed 44779       Non-Billable Time          Evaluation Time additionally includes thorough review of current medical information, gathering information on past medical history/social history and prior level of function, interpretation of standardized testing/informal observation of tasks, assessment of data and development of plan of care and goals.             ALTON Salas OTR/L; [id number]

## 2022-10-13 NOTE — ANESTHESIA PRE PROCEDURE
Department of Anesthesiology  Preprocedure Note       Name:  Barbara Massey   Age:  66 y.o.  :  1950                                          MRN:  [id number]         Date:  10/13/2022      Surgeon: Berta Philippe):  Bhavana De La Cruz MD    Procedure: Procedure(s):  EGD ESOPHAGOGASTRODUODENOSCOPY    Medications prior to admission:   Prior to Admission medications    Not on File       Current medications:    Current Facility-Administered Medications   Medication Dose Route Frequency Provider Last Rate Last Admin    0.45 % sodium chloride infusion   IntraVENous Continuous Dino Davis  mL/hr at 10/13/22 0901 New Bag at 10/13/22 0901    sodium chloride flush 0.9 % injection 5-40 mL  5-40 mL IntraVENous 2 times per day Bhavana De La Cruz MD        sodium chloride flush 0.9 % injection 5-40 mL  5-40 mL IntraVENous PRN Bhavana De La Cruz MD        0.9 % sodium chloride infusion  25 mL IntraVENous PRN Bhavana De La Cruz MD        pantoprazole (PROTONIX) 40 mg in sodium chloride (PF) 10 mL injection  40 mg IntraVENous Q12H Dino Davis MD   40 mg at 10/13/22 1412    sucralfate (CARAFATE) tablet 1 g  1 g Oral 4 times per day Mily Connor MD   1 g at 10/12/22 0013    calcium carbonate (TUMS) chewable tablet 500 mg  500 mg Oral TID PRN Bernadette Reno MD   500 mg at 10/11/22 2029    sodium chloride flush 0.9 % injection 10 mL  10 mL IntraVENous 2 times per day Starlene Card, DO   10 mL at 10/12/22 0917    sodium chloride flush 0.9 % injection 10 mL  10 mL IntraVENous PRN Starlene Card, DO        0.9 % sodium chloride infusion   IntraVENous PRN Starlene Card, DO        enoxaparin (LOVENOX) injection 40 mg  40 mg SubCUTAneous Daily Starlene Card, DO   40 mg at 10/12/22 0917    promethazine (PHENERGAN) tablet 12.5 mg  12.5 mg Oral Q6H PRN Starlene Card, DO        Or    ondansetron [facility]) injection 4 mg  4 mg IntraVENous Q6H PRN Starlene Card, DO   4 mg at 10/12/22 1826    polyethylene glycol ([facility]) packet 17 g  17 g Oral Daily PRN Cecile Kristian, DO        acetaminophen (TYLENOL) tablet 650 mg  650 mg Oral Q6H PRN Cecile Kristian, DO   650 mg at 10/12/22 4605    Or    acetaminophen (TYLENOL) suppository 650 mg  650 mg Rectal Q6H PRN Cecile Kristian, DO        losartan (COZAAR) tablet 25 mg  25 mg Oral Daily Cecile Kristian, DO   25 mg at 10/12/22 3776    hydrALAZINE (APRESOLINE) injection 10 mg  10 mg IntraVENous Q4H PRN Cecile Kristian, DO   10 mg at 10/13/22 0900       Allergies:  No Known Allergies    Problem List:    Patient Active Problem List   Diagnosis Code    Community acquired pneumonia of both lower lobes J18.9    Hiatal hernia K44.9    Dysphagia R13.10       Past Medical History:        Diagnosis Date    Arthritis     Prostate disorder        Past Surgical History:        Procedure Laterality Date    ESOPHAGEAL DILATATION         Social History:    Social History     Tobacco Use    Smoking status: Never    Smokeless tobacco: Never   Substance Use Topics    Alcohol use: Not Currently                                Counseling given: Not Answered      Vital Signs (Current):   Vitals:    10/13/22 0945 10/13/22 1030 10/13/22 1215 10/13/22 1411   BP: (!) 157/89  (!) 172/93 (!) 168/96   Pulse: 83  94 84   Resp:       Temp:       TempSrc:       SpO2:       Weight:       Height:  5' 3\" (1.6 m)                                                BP Readings from Last 3 Encounters:   10/13/22 (!) 168/96   09/30/22 (!) 161/83       NPO Status: Time of last liquid consumption: 2000                        Time of last solid consumption: 2000                        Date of last liquid consumption: 10/12/22                        Date of last solid food consumption: 10/12/22    BMI:   Wt Readings from Last 3 Encounters:   10/06/22 172 lb (78 kg)   09/30/22 179 lb (81.2 kg)     Body mass index is 30.47 kg/m².     CBC:   Lab Results   Component Value Date/Time    WBC 6.5 10/13/2022 05:01 AM    RBC 4.33 10/13/2022 05:01 AM    HGB 13.3 10/13/2022 05:01 AM    HCT 41.5 10/13/2022 05:01 AM    MCV 95.8 10/13/2022 05:01 AM    RDW 13.6 10/13/2022 05:01 AM     10/13/2022 05:01 AM       CMP:   Lab Results   Component Value Date/Time     10/13/2022 05:01 AM    K 3.7 10/13/2022 05:01 AM    K 3.7 10/13/2022 05:01 AM     10/13/2022 05:01 AM    CO2 25 10/13/2022 05:01 AM    BUN 16 10/13/2022 05:01 AM    CREATININE 1.5 10/13/2022 05:01 AM    GFRAA 56 10/13/2022 05:01 AM    LABGLOM 46 10/13/2022 05:01 AM    GLUCOSE 102 10/13/2022 05:01 AM    PROT 6.4 10/13/2022 05:01 AM    CALCIUM 8.8 10/13/2022 05:01 AM    BILITOT 0.7 10/13/2022 05:01 AM    ALKPHOS 43 10/13/2022 05:01 AM    AST 18 10/13/2022 05:01 AM    ALT 19 10/13/2022 05:01 AM       POC Tests: No results for input(s): POCGLU, POCNA, POCK, POCCL, POCBUN, POCHEMO, POCHCT in the last 72 hours. Coags:   Lab Results   Component Value Date/Time    PROTIME 11.9 09/30/2022 03:20 PM    INR 1.1 09/30/2022 03:20 PM    APTT 27.4 09/30/2022 03:20 PM       HCG (If Applicable): No results found for: PREGTESTUR, PREGSERUM, HCG, HCGQUANT     ABGs: No results found for: PHART, PO2ART, WTJ3NQU, YFK7ULS, BEART, L8QGERHO     Type & Screen (If Applicable):  No results found for: LABABO, LABRH    Drug/Infectious Status (If Applicable):  No results found for: HIV, HEPCAB    COVID-19 Screening (If Applicable):   Lab Results   Component Value Date/Time    COVID19 Not Detected 10/07/2022 05:40 PM       10/6/22 CTA Pulmonary   Impression   1. No evidence of pulmonary embolism or acute pulmonary abnormality. 2. Large hiatal hernia. 3. View of the upper abdomen show severe calcified and noncalcified   atherosclerotic plaque involving the abdominal aorta.      10/11/22 FL UGI W KUB      Impression   Severely limited examination with diffuse distension of the esophagus, severe   esophageal dysmotility and a moderate-sized hiatal hernia.  Moderate to   severe gastroesophageal reflux is also present. 10/6/22 EKG  Narrative & Impression    Sinus rhythm with frequent premature ventricular complexes  Minimal voltage criteria for LVH, may be normal variant  Borderline ECG  When compared with ECG of 30-SEP-2022 15:16,  No significant change was found  Confirmed by Emily Snyder ([id number]) on 10/6/2022 3:56:58 PM         Anesthesia Evaluation  Patient summary reviewed and Nursing notes reviewed no history of anesthetic complications:   Airway: Mallampati: II  TM distance: <3 FB   Neck ROM: limited  Mouth opening: < 3 FB   Dental:    (+) edentulous  Comment: Pt denies any loose or chipped dentition    Pulmonary:normal exam    (+) shortness of breath: new,      (-) not a current smoker                           Cardiovascular:  Exercise tolerance: poor (<4 METS), Pt uses walker  (+) hypertension:, angina: at rest, CHF:, GONZALEZ: after ambulating 1 flight of stairs,       ECG reviewed  Rhythm: regular  Rate: normal    Stress test reviewed       Beta Blocker:  Not on Beta Blocker      ROS comment: Pt states he was sitting in bed when he had a tingling pain 4/10 in L chest, and both arms had a tingling sensation-- only lasted for a few seconds per patient     Neuro/Psych:   Negative Neuro/Psych ROS  (+) CVA (> 20 yrs ago):, TIA,             GI/Hepatic/Renal:   (+) hiatal hernia, GERD ( moderate to severe  ): poorly controlled,          ROS comment: Pt had emesis episode 10/12/22 s/p dinner-- states this is normal for him-- has not had any episodes of emesis/regurgitation today    Dysphagia  enalarged prostate  . Endo/Other:    (+) : arthritis:., .                 Abdominal:             Vascular: negative vascular ROS. Other Findings:           Anesthesia Plan      MAC     ASA 3     (22 R Wrist)  Induction: intravenous. MIPS: Postoperative opioids intended and Prophylactic antiemetics administered. Anesthetic plan and risks discussed with patient.     Use of blood products discussed with patient whom consented to blood products. Plan discussed with attending and CRNA.                     Judi Montero RN   10/13/2022

## 2022-10-13 NOTE — PROGRESS NOTES
Hospitalist Progress Note      PCP: No primary care provider on file. Date of Admission: 10/6/2022  Days in the hospital: [address] Course:   Patient is 50-year-old male with history of osteoarthritis, hypertension who comes into hospital with complaints of chest pain, shortness of breath. Initially there was concern for pneumonia for which he was placed on empiric antibiotics. Pneumonia was ruled out. He also stress test done which did not show any evidence of ischemia. Repeat chest x-ray did not show any evidence of pneumonia. CT chest showed large hiatal hernia and patient continues to have dysphagia and so consultation was placed to GI. Patient had upper GI series which showed severe esophageal dysmotility and dilation of esophagus and GERD. Subjective  Patient seen and examined at bedside. Scheduled for EGD today, chart reviewed, overnight events reviewed. Denies any headache, dizziness, chest pain. Remains NPO. Exam:    BP (!) 172/93   Pulse 94   Temp 97.7 °F (36.5 °C)   Resp 17   Ht 5' 3\" (1.6 m)   Wt 172 lb (78 kg)   SpO2 93%   BMI 30.47 kg/m²     HEENT: No pallor, no icterus. Respiratory:  CTA, good air entry. Cardiovascular: RRR, no murmur. Abdomen: Soft, non-tender, BS noted. Musculoskeletal: No joint pains or joint swelling noted. Neurologic: awake, alert and following commands         Assessment/Plan:     Dysphagia, continue with PPI and Carafate, scheduled for EGD today. Upper GI series shows severe esophageal dysmotility, dilatation and severe GERD.     Hypertension, blood pressure controlled    Mild hypernatremia, improved      Labs:   Recent Labs     10/11/22  0524 10/12/22  0501 10/13/22  0501   WBC 6.6 7.2 6.5   HGB 13.5 15.1 13.3   HCT 43.0 48.8 41.5    247 201     Recent Labs     10/11/22  0524 10/12/22  0501 10/13/22  0501    147* 141   K 4.2 4.1 3.7  3.7   * 107 106   CO2 24 27 25   BUN 21 19 16   CREATININE 1.5* 1.5* 1.5*   CALCIUM 8.9 9.4 8.8     Recent Labs     10/11/22  0524 10/12/22  0501 10/13/22  0501   AST 25 26 18   ALT 20 29 19   BILITOT 0.5 0.6 0.7   ALKPHOS 46 52 43     No results for input(s): INR in the last 72 hours. No results for input(s): Estle Carrow in the last 72 hours. Medications:  Reviewed    Infusion Medications    sodium chloride 100 mL/hr at 10/13/22 0901    sodium chloride      sodium chloride       Scheduled Medications    sodium chloride flush  5-40 mL IntraVENous 2 times per day    pantoprazole (PROTONIX) 40 mg injection  40 mg IntraVENous Q12H    sucralfate  1 g Oral 4 times per day    sodium chloride flush  10 mL IntraVENous 2 times per day    enoxaparin  40 mg SubCUTAneous Daily    losartan  25 mg Oral Daily     PRN Meds: sodium chloride flush, sodium chloride, calcium carbonate, sodium chloride flush, sodium chloride, promethazine **OR** ondansetron, polyethylene glycol, acetaminophen **OR** acetaminophen, hydrALAZINE      Intake/Output Summary (Last 24 hours) at 10/13/2022 1233  Last data filed at 10/13/2022 1018  Gross per 24 hour   Intake 1787.77 ml   Output 200 ml   Net 1587.77 ml     Body mass index is 30.47 kg/m². Diet  Diet NPO Exceptions are: Sips of Water with Meds    Code Status  Full Code       Electronically signed by Shubham Ocampo MD on 10/13/2022 at 12:33 PM  [employer]   Please contact me through [employer]    NOTE: This report was transcribed using voice recognition software. Every effort was made to ensure accuracy; however, inadvertent computerized transcription errors may be present.

## 2022-10-13 NOTE — PROGRESS NOTES
Comprehensive Nutrition Assessment    Type and Reason for Visit:  Initial, RD Nutrition Re-Screen/LOS (LOS 7)    Nutrition Recommendations/Plan:   Continue NPO. Will monitor for speech rec/eval. No nutrition intervention indicated at this time. Malnutrition Assessment:  Malnutrition Status:  No malnutrition (10/13/22 1104)    Context:  Acute Illness     Findings of the 6 clinical characteristics of malnutrition:  Energy Intake:  No significant decrease in energy intake  Weight Loss:  Unable to assess (2/2 lack of wt hx on file to assess)     Body Fat Loss:  No significant body fat loss     Muscle Mass Loss:  No significant muscle mass loss    Fluid Accumulation:  No significant fluid accumulation     Strength:  Not Performed    Nutrition Assessment:    Pt. admit from [facility] with fatigue and SOB w/ concern for PNA (ruled out). CT chest showed large hiatal hernia, GERD. GI consulted for ongoing dysphagia. Hx of HTN,esophageal dysmotility, reflux, and symptomatic hiatal hernia,  PO intake appears stable per doc flow with noted intakes of % of most meals. Pt. currently NPO. Will monitor. Nutrition Related Findings:    A&Ox4, +1L I/O, +Nausea, tender/soft abd, +BS, no edema, Wound Type: None       Current Nutrition Intake & Therapies:    Average Meal Intake: %, NPO (most meals)  Average Supplements Intake: None Ordered  Diet NPO Exceptions are: Sips of Water with Meds    Anthropometric Measures:  Height: 5' 3\" (160 cm)  Ideal Body Weight (IBW): 124 lbs (56 kg)    Admission Body Weight: 172 lb (78 kg) (10/06 no method)  Current Body Weight: 172 lb (78 kg) (10/06 no method), 138.7 % IBW. Weight Source: Not Specified  Current BMI (kg/m2): 30.5  Usual Body Weight:  (PABLITO d/t lack of wt hx on file to assess)     Weight Adjustment For: No Adjustment                 BMI Categories: Obese Class 1 (BMI 30.0-34. 9)    Estimated Daily Nutrient Needs:  Energy Requirements Based On: Kcal/kg  Weight Used for Energy Requirements: Current  Energy (kcal/day): 1200-1400kcal (15-18kcal/kg)  Weight Used for Protein Requirements: Ideal  Protein (g/day): 73-85g (1.3-1.5g/kgIBW)  Method Used for Fluid Requirements: 1 ml/kcal  Fluid (ml/day): 7336-7741    Nutrition Diagnosis:   No nutrition diagnosis at this time (insufficient data)     Nutrition Interventions:   Food and/or Nutrient Delivery: Continue Current Diet  Nutrition Education/Counseling: Education not indicated  Coordination of Nutrition Care: Continue to monitor while inpatient, Swallow Evaluation       Goals:     Goals: PO intake 75% or greater, by next RD assessment (to continue)       Nutrition Monitoring and Evaluation:   Behavioral-Environmental Outcomes: None Identified  Food/Nutrient Intake Outcomes: Food and Nutrient Intake  Physical Signs/Symptoms Outcomes: Biochemical Data, Chewing or Swallowing, Fluid Status or Edema, Nutrition Focused Physical Findings, Skin, Weight, GI Status, Nausea or Vomiting    Discharge Planning:    No discharge needs at this time     Mercy Keyes RD  Contact: ext [phone]

## 2022-10-13 NOTE — OP NOTE
Operative Note      Patient: Henri Tristan  YOB: 1950  MRN: [id number]    Date of Procedure: 10/13/2022    Pre-Op Diagnosis: Dysphagia [R13.10]    Post-Op Diagnosis: Esophagitis, Large Hiatal Hernia       Procedure(s):  EGD ESOPHAGOGASTRODUODENOSCOPY    Surgeon(s):  Haley Louie MD    Assistant:   * No surgical staff found *    Anesthesia: Monitor Anesthesia Care    Estimated Blood Loss (mL): Minimal    Complications: None    Specimens:   * No specimens in log *    Implants:  * No implants in log *      Drains: * No LDAs found *    Findings:     Distal LA Grade C Esophagitis with no associated stricture. Large hiatal hernia with retained food contents. Normal stomach. Normal duodenum. Detailed Description of Procedure:   Pre-Anesthesia Assessment:  Prior to the procedure, a history and physical was performed and patient medications and allergies were reviewed. The risks, benefits, complications, treatment options and expected outcomes were discussed with the patient. The possibilities of reaction to medication, pulmonary aspiration, perforation of the gastrointestinal tract, bleeding requiring transfusion or operation, respiratory failure requiring placement on a ventilator, and failure to diagnose a condition or identify polyps/lesions were discussed with the patient. All questions were answered and informed consent was obtained. Patient identification and proposed procedure were verified by the physician, the nurse, the anesthesiologist, the anesthetist, and the technician in the preprocedure area. Please see accompanying documentation. All staff in attendance for the performed procedure act in the role of the Chaperone. After I obtained informed consent, the scope was passed under direct vision. Throughout the procedure, the patient's blood pressure, pulse, and oxygen saturations were monitored continuously.   The gastroscope was introduced through the mouth and advanced to the duodenum. The procedure was performed without difficulty. The patient tolerated the procedure well. EGD:  The mucosa of the esophagus showed changes consistent with LA Grade C esophagitis in the distal esophagus. No associated ulceration or stricture was appreciated. The Z-line was irregular and found at  30cm. A large, 10cm hiatal hernia was appreciated with the top of the gastric folds located at 30cm and the gastric pinch located at 40cm. A moderate amount of fibrous food content was appreciated within the hiatal hernia. The mucosa of the stomach appeared normal. No inflammation, ulceration, or erosion was appreciated. The fundus and cardia were carefully inspected in retroflexion and showed a hiatal hernia. Retained fibrous food contents were also appreciated within the gastric fundus. The mucosa of the duodenum appeared normal.   No stricture, inflammation, erosion, or ulceration was appreciated. Recommendations:  -Continue PPI BID. -Soft, Low-fat, low-fiber diet.  -Will need outpatient manometry study and referral to [facility] Surgery for hiata hernia repair afterward.   -I will arrange for outpatient follow-up.          Electronically signed by Hector Hernandez MD on 10/13/2022 at 4:49 PM

## 2022-10-13 NOTE — CARE COORDINATION
10/13 Update CM note. Pt for EGD today. Await further recommendations post procedure. PT/OT evals pending. Plan remains to return to [facility] once medically stable. Pt's CM Hallie Bobby needs notified @ [phone], ext. [phone]. Pt's  Mellisa Aguilar will also need notified @ [phone].      SYLWIA YadavN, RN  [facility] Case Management   Cell: [phone]

## 2022-10-14 VITALS
BODY MASS INDEX: 30.48 KG/M2 | HEIGHT: 63 IN | HEART RATE: 45 BPM | DIASTOLIC BLOOD PRESSURE: 79 MMHG | RESPIRATION RATE: 18 BRPM | SYSTOLIC BLOOD PRESSURE: 157 MMHG | WEIGHT: 172 LBS | TEMPERATURE: 98.1 F | OXYGEN SATURATION: 97 %

## 2022-10-14 PROCEDURE — A4216 STERILE WATER/SALINE, 10 ML: HCPCS | Performed by: INTERNAL MEDICINE

## 2022-10-14 PROCEDURE — 6370000000 HC RX 637 (ALT 250 FOR IP): Performed by: INTERNAL MEDICINE

## 2022-10-14 PROCEDURE — 2580000003 HC RX 258: Performed by: FAMILY MEDICINE

## 2022-10-14 PROCEDURE — 6370000000 HC RX 637 (ALT 250 FOR IP): Performed by: FAMILY MEDICINE

## 2022-10-14 PROCEDURE — 6360000002 HC RX W HCPCS: Performed by: INTERNAL MEDICINE

## 2022-10-14 PROCEDURE — 6360000002 HC RX W HCPCS: Performed by: FAMILY MEDICINE

## 2022-10-14 PROCEDURE — 2580000003 HC RX 258: Performed by: INTERNAL MEDICINE

## 2022-10-14 PROCEDURE — C9113 INJ PANTOPRAZOLE SODIUM, VIA: HCPCS | Performed by: INTERNAL MEDICINE

## 2022-10-14 RX ORDER — PANTOPRAZOLE SODIUM 40 MG/1
40 TABLET, DELAYED RELEASE ORAL
Qty: 180 TABLET | Refills: 1 | Status: SHIPPED | OUTPATIENT
Start: 2022-10-14

## 2022-10-14 RX ORDER — SUCRALFATE 1 G/1
1 TABLET ORAL 4 TIMES DAILY
Qty: 120 TABLET | Refills: 1 | Status: SHIPPED | OUTPATIENT
Start: 2022-10-14

## 2022-10-14 RX ORDER — LOSARTAN POTASSIUM 25 MG/1
25 TABLET ORAL DAILY
Qty: 30 TABLET | Refills: 3 | Status: SHIPPED | OUTPATIENT
Start: 2022-10-15 | End: 2022-10-14 | Stop reason: SDUPTHER

## 2022-10-14 RX ORDER — SUCRALFATE 1 G/1
1 TABLET ORAL 4 TIMES DAILY
Qty: 120 TABLET | Refills: 1 | Status: SHIPPED | OUTPATIENT
Start: 2022-10-14 | End: 2022-10-14 | Stop reason: SDUPTHER

## 2022-10-14 RX ORDER — LOSARTAN POTASSIUM 25 MG/1
25 TABLET ORAL DAILY
Qty: 30 TABLET | Refills: 3 | Status: SHIPPED | OUTPATIENT
Start: 2022-10-15

## 2022-10-14 RX ORDER — PANTOPRAZOLE SODIUM 40 MG/1
40 TABLET, DELAYED RELEASE ORAL EVERY 12 HOURS
Status: DISCONTINUED | OUTPATIENT
Start: 2022-10-14 | End: 2022-10-14 | Stop reason: HOSPADM

## 2022-10-14 RX ORDER — PANTOPRAZOLE SODIUM 40 MG/1
40 TABLET, DELAYED RELEASE ORAL
Qty: 180 TABLET | Refills: 1 | Status: SHIPPED | OUTPATIENT
Start: 2022-10-14 | End: 2022-10-14 | Stop reason: SDUPTHER

## 2022-10-14 RX ADMIN — SUCRALFATE 1 G: 1 TABLET ORAL at 05:20

## 2022-10-14 RX ADMIN — SODIUM CHLORIDE 40 MG: 9 INJECTION INTRAMUSCULAR; INTRAVENOUS; SUBCUTANEOUS at 02:00

## 2022-10-14 RX ADMIN — SODIUM CHLORIDE, PRESERVATIVE FREE 10 ML: 5 INJECTION INTRAVENOUS at 09:03

## 2022-10-14 RX ADMIN — ENOXAPARIN SODIUM 40 MG: 100 INJECTION SUBCUTANEOUS at 09:03

## 2022-10-14 RX ADMIN — SUCRALFATE 1 G: 1 TABLET ORAL at 01:05

## 2022-10-14 RX ADMIN — ACETAMINOPHEN 650 MG: 325 TABLET ORAL at 00:43

## 2022-10-14 RX ADMIN — SUCRALFATE 1 G: 1 TABLET ORAL at 11:29

## 2022-10-14 RX ADMIN — LOSARTAN POTASSIUM 25 MG: 25 TABLET, FILM COATED ORAL at 09:03

## 2022-10-14 ASSESSMENT — PAIN SCALES - GENERAL
PAINLEVEL_OUTOF10: 6
PAINLEVEL_OUTOF10: 0

## 2022-10-14 ASSESSMENT — PAIN DESCRIPTION - LOCATION: LOCATION: BACK

## 2022-10-14 ASSESSMENT — PAIN DESCRIPTION - DESCRIPTORS: DESCRIPTORS: ACHING

## 2022-10-14 NOTE — ANESTHESIA POSTPROCEDURE EVALUATION
Department of Anesthesiology  Postprocedure Note    Patient: Zhen Ramsey  MRN: [id number]  [place]: 1950  Date of evaluation: 10/13/2022      Procedure Summary     Date: 10/13/22 Room / Location: [facility] 03 / [facility]    Anesthesia Start: 1624 Anesthesia Stop: 7278    Procedure: EGD ESOPHAGOGASTRODUODENOSCOPY Diagnosis:       Dysphagia      (Dysphagia [R13.10])    Surgeons: Jordan Alexander MD Responsible Provider: Matt Diop MD    Anesthesia Type: MAC ASA Status: 3          Anesthesia Type: MAC    Ino Phase I: Ino Score: 10    Ino Phase II:        Anesthesia Post Evaluation    Patient location during evaluation: PACU  Patient participation: complete - patient participated  Level of consciousness: awake and alert  Airway patency: patent  Nausea & Vomiting: no nausea and no vomiting  Complications: no  Cardiovascular status: hemodynamically stable and blood pressure returned to baseline  Respiratory status: acceptable  Hydration status: euvolemic

## 2022-10-14 NOTE — PROGRESS NOTES
Notification of IV to PO conversion: This patient's order for pantoprazole 40 mg IV Q12H has been changed to PO as approved by the Pharmacy & Therapeutics and Medical Executive Committees. If the patient should become strict NPO while on this therapy, contact the prescriber for further orders.       Janeth Kulkarni, PharmD, [address]  PGY1 Pharmacy Resident     10/14/2022 1:30 PM

## 2022-10-14 NOTE — CARE COORDINATION
10/14 Update CM note. Pt underwent EGD yesterday which revealed esophagitis with no stricture, large hiatal hernia with retained food contents, normal stomach and normal duodenum. Plan is for PPI BID, soft, low-fat, and low-fiber diet and pt will need outpatient manometry study and referral to MIS surgery for hiatal hernia repair afterward. Per Dr. Marshall Mckeon note, he will arrange follow up. Email sent to pt's CM Aura Jarvis @ [email or web address] to confirm if they can accommodate pt's diet and take him back to group home today. [phone]  Received email from Auar Jarvis, [facility] NOT [facility] because of [facility] restrictions and pending surgical consults. She instructed CM to contact pt's  Cheswick Mayur @ [phone] to find alternative housing. LM with Yamilet Robert. Await callback. [phone]  Per Aura Jarvis, pt is ABLE to [facility] to [facility] today. She requested AVS be emailed to her (completed). Spoke with [facility] at [phone] and they stated they will  pt in 30 minutes. Spoke with [facility] regarding needing meds delivered to beside. Bedside RN notified. 1300  Received call from [facility] regarding if they could fill prescriptions or if [facility] had a pharmacy they used. Call placed to [address] [place] [address] and spoke with pt's TONY Garcia, she instructed CM to forward prescriptions to [facility] at [address] [facility] [address]. Meds were forwarded, AVS was re-printed and given to bedside RN.     SYLWIA SandhuN, RN  [facility] Case Management   Cell: [phone]

## 2022-10-14 NOTE — DISCHARGE SUMMARY
Hospital Medicine Discharge Summary    Patient ID: Farrah Martinez      Patient's PCP: No primary care provider on file. Admit Date: 10/6/2022     Discharge Date:    10/14/2022    Admitting Physician: Roni Edgar DO     Discharge Physician: Anupam Beltre MD      Condition at discharge: Stable    Disposition: Group home    Discharge Diagnoses:  Dysphagia  Esophagitis  Hiatal hernia  Esophageal dilatation  GERD  Hypertension    The patient was seen and examined on day of discharge and this discharge summary is in conjunction with any daily progress note from day of discharge. Hospital Course:   Patient is 69-year-old male with history of osteoarthritis, hypertension who comes into hospital with complaints of chest pain, shortness of breath. Initially there was concern for pneumonia for which he was placed on empiric antibiotics. Pneumonia was ruled out. He also stress test done which did not show any evidence of ischemia. Repeat chest x-ray did not show any evidence of pneumonia. CT chest showed large hiatal hernia and patient continues to have dysphagia and so consultation was placed to GI. Patient had upper GI series which showed severe esophageal dysmotility and dilation of esophagus and GERD. Patient underwent EGD which showed severe esophagitis and hiatal hernia. GI was okay with patient being discharged. He is tolerating diet. He was started on Cozaar for hypertension. He will need close follow-up with his blood pressure as outpatient. Consults:     IP CONSULT TO INTERNAL MEDICINE  IP CONSULT TO GI    Significant Diagnostic Studies:  As above      Discharge Instructions/Follow-up:  As above       Activity: activity as tolerated    Physical Exam:  Vitals:    10/14/22 1123   BP: (!) 157/79   Pulse: (!) 45   Resp:    Temp:    SpO2:        General appearance: No apparent distress, appears stated age and cooperative.   Respiratory:  Clear to auscultation, good air entry. Cardiovascular: RRR, no murmur. Abdomen: Soft, non-tender, non-distended with normal bowel sounds. Neurologic: awake, alert and following commands     Labs: For convenience and continuity at follow-up the following most recent labs are provided:      CBC:    Lab Results   Component Value Date/Time    WBC 6.5 10/13/2022 05:01 AM    HGB 13.3 10/13/2022 05:01 AM    HCT 41.5 10/13/2022 05:01 AM     10/13/2022 05:01 AM       Renal:    Lab Results   Component Value Date/Time     10/13/2022 05:01 AM    K 3.7 10/13/2022 05:01 AM    K 3.7 10/13/2022 05:01 AM     10/13/2022 05:01 AM    CO2 25 10/13/2022 05:01 AM    BUN 16 10/13/2022 05:01 AM    CREATININE 1.5 10/13/2022 05:01 AM    CALCIUM 8.8 10/13/2022 05:01 AM         Discharge Medications:     Current Discharge Medication List             Details   losartan (COZAAR) 25 MG tablet Take 1 tablet by mouth daily  Qty: 30 tablet, Refills: 3      sucralfate (CARAFATE) 1 GM tablet Take 1 tablet by mouth 4 times daily  Qty: 120 tablet, Refills: 1      pantoprazole (PROTONIX) 40 MG tablet Take 1 tablet by mouth 2 times daily (before meals)  Qty: 180 tablet, Refills: 1             Time Spent on discharge is more than 31 min in the examination, evaluation, counseling and review of medications and discharge plan. Signed:    Srini Gonzalez MD   10/14/2022      Thank you No primary care provider on file. for the opportunity to be involved in this patient's care. If you have any questions or concerns please feel free to contact me. NOTE: This report was transcribed using voice recognition software. Every effort was made to ensure accuracy; however, inadvertent computerized transcription errors may be present.

## 2022-10-14 NOTE — PLAN OF CARE
Problem: Pain  Goal: Verbalizes/displays adequate comfort level or baseline comfort level  10/13/2022 2135 by Paolo Avila RN  Outcome: Progressing  10/13/2022 1020 by Demetris Cyr RN  Outcome: Progressing     Problem: Safety - Adult  Goal: Free from fall injury  10/13/2022 2135 by Paolo Avila RN  Outcome: Progressing  10/13/2022 1020 by Demetris Cyr RN  Outcome: Progressing     Problem: Skin/Tissue Integrity  Goal: Absence of new skin breakdown  Description: 1. Monitor for areas of redness and/or skin breakdown  2. Assess vascular access sites hourly  3. Every 4-6 hours minimum:  Change oxygen saturation probe site  4. Every 4-6 hours:  If on nasal continuous positive airway pressure, respiratory therapy assess nares and determine need for appliance change or resting period.   10/13/2022 2135 by Paolo Avila RN  Outcome: Progressing  10/13/2022 1020 by Demetris Cyr RN  Outcome: Progressing     Problem: ABCDS Injury Assessment  Goal: Absence of physical injury  10/13/2022 2135 by Paolo Avila RN  Outcome: Progressing  10/13/2022 1020 by Demetris Cyr RN  Outcome: Progressing

## 2022-10-20 ENCOUNTER — TELEPHONE (OUTPATIENT)
Dept: OTHER | Facility: CLINIC | Age: 72
End: 2022-10-20

## 2022-10-20 ENCOUNTER — APPOINTMENT (OUTPATIENT)
Dept: GENERAL RADIOLOGY | Age: 72
DRG: 220 | End: 2022-10-20
Payer: MEDICAID

## 2022-10-20 ENCOUNTER — HOSPITAL ENCOUNTER (INPATIENT)
Age: 72
LOS: 10 days | Discharge: NURSING, REHAB OR LONG-TERM CARE | DRG: 220 | End: 2022-11-01
Attending: EMERGENCY MEDICINE | Admitting: HOSPITALIST
Payer: MEDICAID

## 2022-10-20 ENCOUNTER — APPOINTMENT (OUTPATIENT)
Dept: CT IMAGING | Age: 72
DRG: 220 | End: 2022-10-20
Payer: MEDICAID

## 2022-10-20 DIAGNOSIS — R07.9 CHEST PAIN, UNSPECIFIED TYPE: Primary | ICD-10-CM

## 2022-10-20 DIAGNOSIS — I49.8 BIGEMINY: ICD-10-CM

## 2022-10-20 DIAGNOSIS — R11.2 NAUSEA AND VOMITING, UNSPECIFIED VOMITING TYPE: ICD-10-CM

## 2022-10-20 PROBLEM — I10 PRIMARY HYPERTENSION: Status: ACTIVE | Noted: 2022-10-20

## 2022-10-20 PROBLEM — Z86.73 HISTORY OF CVA (CEREBROVASCULAR ACCIDENT): Status: ACTIVE | Noted: 2022-10-20

## 2022-10-20 PROBLEM — I49.3 PVC'S (PREMATURE VENTRICULAR CONTRACTIONS): Status: ACTIVE | Noted: 2022-10-20

## 2022-10-20 LAB
ABO/RH: NORMAL
ALBUMIN SERPL-MCNC: 3.9 G/DL (ref 3.5–5.2)
ALP BLD-CCNC: 47 U/L (ref 40–129)
ALT SERPL-CCNC: 23 U/L (ref 0–40)
ANION GAP SERPL CALCULATED.3IONS-SCNC: 11 MMOL/L (ref 7–16)
ANTIBODY SCREEN: NORMAL
AST SERPL-CCNC: 17 U/L (ref 0–39)
BASOPHILS ABSOLUTE: 0.03 E9/L (ref 0–0.2)
BASOPHILS RELATIVE PERCENT: 0.5 % (ref 0–2)
BILIRUB SERPL-MCNC: 0.4 MG/DL (ref 0–1.2)
BUN BLDV-MCNC: 22 MG/DL (ref 6–23)
CALCIUM SERPL-MCNC: 9.2 MG/DL (ref 8.6–10.2)
CHLORIDE BLD-SCNC: 106 MMOL/L (ref 98–107)
CO2: 24 MMOL/L (ref 22–29)
CREAT SERPL-MCNC: 1.4 MG/DL (ref 0.7–1.2)
EKG ATRIAL RATE: 74 BPM
EKG ATRIAL RATE: 85 BPM
EKG P AXIS: 22 DEGREES
EKG P AXIS: 33 DEGREES
EKG P-R INTERVAL: 132 MS
EKG P-R INTERVAL: 138 MS
EKG Q-T INTERVAL: 404 MS
EKG Q-T INTERVAL: 410 MS
EKG QRS DURATION: 70 MS
EKG QRS DURATION: 70 MS
EKG QTC CALCULATION (BAZETT): 455 MS
EKG QTC CALCULATION (BAZETT): 480 MS
EKG R AXIS: -2 DEGREES
EKG R AXIS: 0 DEGREES
EKG T AXIS: 11 DEGREES
EKG T AXIS: 24 DEGREES
EKG VENTRICULAR RATE: 74 BPM
EKG VENTRICULAR RATE: 85 BPM
EOSINOPHILS ABSOLUTE: 0.26 E9/L (ref 0.05–0.5)
EOSINOPHILS RELATIVE PERCENT: 4.3 % (ref 0–6)
GFR SERPL CREATININE-BSD FRML MDRD: 53 ML/MIN/1.73
GLUCOSE BLD-MCNC: 111 MG/DL (ref 74–99)
HCT VFR BLD CALC: 41 % (ref 37–54)
HEMOGLOBIN: 13.6 G/DL (ref 12.5–16.5)
IMMATURE GRANULOCYTES #: 0.01 E9/L
IMMATURE GRANULOCYTES %: 0.2 % (ref 0–5)
LACTIC ACID: 1.7 MMOL/L (ref 0.5–2.2)
LIPASE: 39 U/L (ref 13–60)
LYMPHOCYTES ABSOLUTE: 1.79 E9/L (ref 1.5–4)
LYMPHOCYTES RELATIVE PERCENT: 29.3 % (ref 20–42)
MCH RBC QN AUTO: 31.5 PG (ref 26–35)
MCHC RBC AUTO-ENTMCNC: 33.2 % (ref 32–34.5)
MCV RBC AUTO: 94.9 FL (ref 80–99.9)
MONOCYTES ABSOLUTE: 0.61 E9/L (ref 0.1–0.95)
MONOCYTES RELATIVE PERCENT: 10 % (ref 2–12)
NEUTROPHILS ABSOLUTE: 3.4 E9/L (ref 1.8–7.3)
NEUTROPHILS RELATIVE PERCENT: 55.7 % (ref 43–80)
PDW BLD-RTO: 13.4 FL (ref 11.5–15)
PLATELET # BLD: 238 E9/L (ref 130–450)
PMV BLD AUTO: 10.4 FL (ref 7–12)
POTASSIUM SERPL-SCNC: 3.5 MMOL/L (ref 3.5–5)
RBC # BLD: 4.32 E12/L (ref 3.8–5.8)
SODIUM BLD-SCNC: 141 MMOL/L (ref 132–146)
TOTAL PROTEIN: 6.8 G/DL (ref 6.4–8.3)
TROPONIN, HIGH SENSITIVITY: 17 NG/L (ref 0–11)
TROPONIN, HIGH SENSITIVITY: 19 NG/L (ref 0–11)
WBC # BLD: 6.1 E9/L (ref 4.5–11.5)

## 2022-10-20 PROCEDURE — 6370000000 HC RX 637 (ALT 250 FOR IP): Performed by: EMERGENCY MEDICINE

## 2022-10-20 PROCEDURE — 80053 COMPREHEN METABOLIC PANEL: CPT

## 2022-10-20 PROCEDURE — C9113 INJ PANTOPRAZOLE SODIUM, VIA: HCPCS | Performed by: INTERNAL MEDICINE

## 2022-10-20 PROCEDURE — 6370000000 HC RX 637 (ALT 250 FOR IP)

## 2022-10-20 PROCEDURE — C9113 INJ PANTOPRAZOLE SODIUM, VIA: HCPCS | Performed by: EMERGENCY MEDICINE

## 2022-10-20 PROCEDURE — G0378 HOSPITAL OBSERVATION PER HR: HCPCS

## 2022-10-20 PROCEDURE — 6360000002 HC RX W HCPCS: Performed by: EMERGENCY MEDICINE

## 2022-10-20 PROCEDURE — 2580000003 HC RX 258: Performed by: INTERNAL MEDICINE

## 2022-10-20 PROCEDURE — 74177 CT ABD & PELVIS W/CONTRAST: CPT

## 2022-10-20 PROCEDURE — 84484 ASSAY OF TROPONIN QUANT: CPT

## 2022-10-20 PROCEDURE — 83605 ASSAY OF LACTIC ACID: CPT

## 2022-10-20 PROCEDURE — 93005 ELECTROCARDIOGRAM TRACING: CPT | Performed by: EMERGENCY MEDICINE

## 2022-10-20 PROCEDURE — 6360000002 HC RX W HCPCS: Performed by: INTERNAL MEDICINE

## 2022-10-20 PROCEDURE — 6370000000 HC RX 637 (ALT 250 FOR IP): Performed by: INTERNAL MEDICINE

## 2022-10-20 PROCEDURE — 86850 RBC ANTIBODY SCREEN: CPT

## 2022-10-20 PROCEDURE — 36415 COLL VENOUS BLD VENIPUNCTURE: CPT

## 2022-10-20 PROCEDURE — 99285 EMERGENCY DEPT VISIT HI MDM: CPT

## 2022-10-20 PROCEDURE — 71045 X-RAY EXAM CHEST 1 VIEW: CPT

## 2022-10-20 PROCEDURE — 6360000004 HC RX CONTRAST MEDICATION: Performed by: RADIOLOGY

## 2022-10-20 PROCEDURE — A4216 STERILE WATER/SALINE, 10 ML: HCPCS | Performed by: STUDENT IN AN ORGANIZED HEALTH CARE EDUCATION/TRAINING PROGRAM

## 2022-10-20 PROCEDURE — APPSS60 APP SPLIT SHARED TIME 46-60 MINUTES

## 2022-10-20 PROCEDURE — A4216 STERILE WATER/SALINE, 10 ML: HCPCS | Performed by: INTERNAL MEDICINE

## 2022-10-20 PROCEDURE — 6360000002 HC RX W HCPCS: Performed by: STUDENT IN AN ORGANIZED HEALTH CARE EDUCATION/TRAINING PROGRAM

## 2022-10-20 PROCEDURE — 85025 COMPLETE CBC W/AUTO DIFF WBC: CPT

## 2022-10-20 PROCEDURE — 86900 BLOOD TYPING SEROLOGIC ABO: CPT

## 2022-10-20 PROCEDURE — 96376 TX/PRO/DX INJ SAME DRUG ADON: CPT

## 2022-10-20 PROCEDURE — 2580000003 HC RX 258: Performed by: STUDENT IN AN ORGANIZED HEALTH CARE EDUCATION/TRAINING PROGRAM

## 2022-10-20 PROCEDURE — 96374 THER/PROPH/DIAG INJ IV PUSH: CPT

## 2022-10-20 PROCEDURE — 86901 BLOOD TYPING SEROLOGIC RH(D): CPT

## 2022-10-20 PROCEDURE — 83690 ASSAY OF LIPASE: CPT

## 2022-10-20 PROCEDURE — 96375 TX/PRO/DX INJ NEW DRUG ADDON: CPT

## 2022-10-20 PROCEDURE — 99222 1ST HOSP IP/OBS MODERATE 55: CPT | Performed by: INTERNAL MEDICINE

## 2022-10-20 PROCEDURE — C9113 INJ PANTOPRAZOLE SODIUM, VIA: HCPCS | Performed by: STUDENT IN AN ORGANIZED HEALTH CARE EDUCATION/TRAINING PROGRAM

## 2022-10-20 RX ORDER — ONDANSETRON 2 MG/ML
4 INJECTION INTRAMUSCULAR; INTRAVENOUS EVERY 6 HOURS PRN
Status: DISCONTINUED | OUTPATIENT
Start: 2022-10-20 | End: 2022-10-22

## 2022-10-20 RX ORDER — AMLODIPINE BESYLATE 5 MG/1
5 TABLET ORAL DAILY
Status: DISCONTINUED | OUTPATIENT
Start: 2022-10-20 | End: 2022-10-21

## 2022-10-20 RX ORDER — PANTOPRAZOLE SODIUM 40 MG/10ML
40 INJECTION, POWDER, LYOPHILIZED, FOR SOLUTION INTRAVENOUS ONCE
Status: COMPLETED | OUTPATIENT
Start: 2022-10-20 | End: 2022-10-20

## 2022-10-20 RX ORDER — LOSARTAN POTASSIUM 50 MG/1
100 TABLET ORAL DAILY
Status: DISCONTINUED | OUTPATIENT
Start: 2022-10-20 | End: 2022-11-01 | Stop reason: HOSPADM

## 2022-10-20 RX ORDER — PANTOPRAZOLE SODIUM 40 MG/10ML
40 INJECTION, POWDER, LYOPHILIZED, FOR SOLUTION INTRAVENOUS ONCE
Status: DISCONTINUED | OUTPATIENT
Start: 2022-10-20 | End: 2022-10-20 | Stop reason: SDUPTHER

## 2022-10-20 RX ORDER — POTASSIUM CHLORIDE 20 MEQ/1
40 TABLET, EXTENDED RELEASE ORAL ONCE
Status: COMPLETED | OUTPATIENT
Start: 2022-10-20 | End: 2022-10-20

## 2022-10-20 RX ORDER — SUCRALFATE 1 G/1
1 TABLET ORAL EVERY 6 HOURS SCHEDULED
Status: DISCONTINUED | OUTPATIENT
Start: 2022-10-20 | End: 2022-11-01 | Stop reason: HOSPADM

## 2022-10-20 RX ORDER — DOCUSATE SODIUM 100 MG/1
100 CAPSULE, LIQUID FILLED ORAL DAILY
COMMUNITY

## 2022-10-20 RX ORDER — MAGNESIUM HYDROXIDE/ALUMINUM HYDROXICE/SIMETHICONE 120; 1200; 1200 MG/30ML; MG/30ML; MG/30ML
30 SUSPENSION ORAL EVERY 6 HOURS PRN
Status: DISCONTINUED | OUTPATIENT
Start: 2022-10-20 | End: 2022-10-29

## 2022-10-20 RX ORDER — FAMOTIDINE 20 MG/1
10 TABLET, FILM COATED ORAL 2 TIMES DAILY
Status: DISCONTINUED | OUTPATIENT
Start: 2022-10-20 | End: 2022-11-01 | Stop reason: HOSPADM

## 2022-10-20 RX ORDER — HYDRALAZINE HYDROCHLORIDE 20 MG/ML
10 INJECTION INTRAMUSCULAR; INTRAVENOUS ONCE
Status: DISCONTINUED | OUTPATIENT
Start: 2022-10-20 | End: 2022-10-20

## 2022-10-20 RX ORDER — LOPERAMIDE HYDROCHLORIDE 2 MG/1
2 CAPSULE ORAL 4 TIMES DAILY PRN
COMMUNITY

## 2022-10-20 RX ORDER — METOCLOPRAMIDE HYDROCHLORIDE 5 MG/ML
10 INJECTION INTRAMUSCULAR; INTRAVENOUS EVERY 6 HOURS
Status: DISCONTINUED | OUTPATIENT
Start: 2022-10-20 | End: 2022-10-21

## 2022-10-20 RX ADMIN — SUCRALFATE 1 G: 1 TABLET ORAL at 23:13

## 2022-10-20 RX ADMIN — SODIUM CHLORIDE, PRESERVATIVE FREE 40 MG: 5 INJECTION INTRAVENOUS at 23:13

## 2022-10-20 RX ADMIN — ALUMINUM HYDROXIDE, MAGNESIUM HYDROXIDE, AND SIMETHICONE: 200; 200; 20 SUSPENSION ORAL at 04:58

## 2022-10-20 RX ADMIN — POTASSIUM CHLORIDE 40 MEQ: 1500 TABLET, EXTENDED RELEASE ORAL at 16:52

## 2022-10-20 RX ADMIN — SODIUM CHLORIDE 40 MG: 9 INJECTION, SOLUTION INTRAMUSCULAR; INTRAVENOUS; SUBCUTANEOUS at 12:09

## 2022-10-20 RX ADMIN — PANTOPRAZOLE SODIUM 40 MG: 40 INJECTION, POWDER, FOR SOLUTION INTRAVENOUS at 04:27

## 2022-10-20 RX ADMIN — LOSARTAN POTASSIUM 100 MG: 50 TABLET, FILM COATED ORAL at 12:10

## 2022-10-20 RX ADMIN — ONDANSETRON 4 MG: 2 INJECTION INTRAMUSCULAR; INTRAVENOUS at 04:27

## 2022-10-20 RX ADMIN — IOPAMIDOL 75 ML: 755 INJECTION, SOLUTION INTRAVENOUS at 04:07

## 2022-10-20 RX ADMIN — FAMOTIDINE 10 MG: 20 TABLET ORAL at 21:01

## 2022-10-20 RX ADMIN — ONDANSETRON 4 MG: 2 INJECTION INTRAMUSCULAR; INTRAVENOUS at 21:01

## 2022-10-20 RX ADMIN — SUCRALFATE 1 G: 1 TABLET ORAL at 16:52

## 2022-10-20 RX ADMIN — SUCRALFATE 1 G: 1 TABLET ORAL at 12:11

## 2022-10-20 RX ADMIN — AMLODIPINE BESYLATE 5 MG: 5 TABLET ORAL at 16:52

## 2022-10-20 RX ADMIN — METOCLOPRAMIDE 10 MG: 5 INJECTION, SOLUTION INTRAMUSCULAR; INTRAVENOUS at 14:56

## 2022-10-20 RX ADMIN — FAMOTIDINE 10 MG: 20 TABLET ORAL at 12:10

## 2022-10-20 RX ADMIN — METOCLOPRAMIDE 10 MG: 5 INJECTION, SOLUTION INTRAMUSCULAR; INTRAVENOUS at 21:01

## 2022-10-20 ASSESSMENT — LIFESTYLE VARIABLES
HOW OFTEN DO YOU HAVE A DRINK CONTAINING ALCOHOL: NEVER
HOW MANY STANDARD DRINKS CONTAINING ALCOHOL DO YOU HAVE ON A TYPICAL DAY: PATIENT DOES NOT DRINK

## 2022-10-20 ASSESSMENT — PAIN SCALES - GENERAL
PAINLEVEL_OUTOF10: 8
PAINLEVEL_OUTOF10: 4

## 2022-10-20 ASSESSMENT — PAIN DESCRIPTION - LOCATION: LOCATION: CHEST;NECK;ARM

## 2022-10-20 ASSESSMENT — PAIN DESCRIPTION - PAIN TYPE: TYPE: ACUTE PAIN

## 2022-10-20 ASSESSMENT — PAIN DESCRIPTION - ORIENTATION: ORIENTATION: LEFT

## 2022-10-20 ASSESSMENT — PAIN DESCRIPTION - DESCRIPTORS: DESCRIPTORS: THROBBING

## 2022-10-20 ASSESSMENT — PAIN - FUNCTIONAL ASSESSMENT: PAIN_FUNCTIONAL_ASSESSMENT: 0-10

## 2022-10-20 NOTE — ED PROVIDER NOTES
HPI:  10/20/22, Time: 2:48 AM EDT         Ramone Durand is a 66 y.o. male presenting to the ED for history of nausea vomiting as well as chest pain and sore throat, beginning days ago. The complaint has been persistent, moderate in severity, and worsened by nothing. Patient presenting here because of history of nausea vomiting he also reporting chest pains. Per report patient states that the pain was going into his neck and back as well as his left arm. .  Patient was just recently in the hospital for reported pneumonia as well as had EGD done as well as stress test.  Patient reports he has been vomiting since he had left the hospital.  Patient reporting sore throat more so from his vomiting. His chest pain comes and goes and last for several minutes its mainly left-sided. Patient also reporting some upper abdominal pain as well. He reports no hematemesis he reports no black or tarry stools. Patient reporting no fever chills he reports no leg pain or swelling. Patient reports he has been taking his medications daily as prescribed at home. ROS:   Pertinent positives and negatives are stated within HPI, all other systems reviewed and are negative.  --------------------------------------------- PAST HISTORY ---------------------------------------------  Past Medical History:  has a past medical history of Arthritis and Prostate disorder. Past Surgical History:  has a past surgical history that includes Esophagus dilation and Upper gastrointestinal endoscopy (N/A, 10/13/2022). Social History:  reports that he has never smoked. He has never used smokeless tobacco. He reports that he does not currently use alcohol. He reports that he does not use drugs. Family History: family history is not on file. The patients home medications have been reviewed. Allergies: Patient has no known allergies.     ---------------------------------------------------PHYSICAL EXAM--------------------------------------    Constitutional/General: Alert and oriented x3, well appearing, non toxic in NAD  Head: Normocephalic and atraumatic  Eyes: PERRL, EOMI  Mouth: Oropharynx clear, handling secretions, no trismus  Neck: Supple, full ROM, non tender to palpation in the midline, no stridor, no crepitus, no meningeal signs  Pulmonary: Lungs clear to auscultation bilaterally, no wheezes, rales, or rhonchi. Not in respiratory distress  Cardiovascular:  Regular rate. Regular rhythm. No murmurs, gallops, or rubs. 2+ distal pulses  Chest: no chest wall tenderness  Abdomen: Soft. Tender upper abdomen non distended. +BS. No rebound, guarding, or rigidity. No pulsatile masses appreciated. Musculoskeletal: Moves all extremities x 4. Warm and well perfused, no clubbing, cyanosis, or edema. Capillary refill <3 seconds  Skin: warm and dry. No rashes. Neurologic: GCS 15, CN 2-12 grossly intact, no focal deficits, symmetric strength 5/5 in the upper and lower extremities bilaterally  Psych: Normal Affect    -------------------------------------------------- RESULTS -------------------------------------------------  I have personally reviewed all laboratory and imaging results for this patient. Results are listed below.      LABS:  Results for orders placed or performed during the hospital encounter of 10/20/22   CBC with Auto Differential   Result Value Ref Range    WBC 6.1 4.5 - 11.5 E9/L    RBC 4.32 3.80 - 5.80 E12/L    Hemoglobin 13.6 12.5 - 16.5 g/dL    Hematocrit 41.0 37.0 - 54.0 %    MCV 94.9 80.0 - 99.9 fL    MCH 31.5 26.0 - 35.0 pg    MCHC 33.2 32.0 - 34.5 %    RDW 13.4 11.5 - 15.0 fL    Platelets 001 556 - 904 E9/L    MPV 10.4 7.0 - 12.0 fL    Neutrophils % 55.7 43.0 - 80.0 %    Immature Granulocytes % 0.2 0.0 - 5.0 %    Lymphocytes % 29.3 20.0 - 42.0 %    Monocytes % 10.0 2.0 - 12.0 %    Eosinophils % 4.3 0.0 - 6.0 %    Basophils % 0.5 0.0 - 2.0 %    Neutrophils Absolute 3.40 1.80 - 7.30 E9/L Immature Granulocytes # 0.01 E9/L    Lymphocytes Absolute 1.79 1.50 - 4.00 E9/L    Monocytes Absolute 0.61 0.10 - 0.95 E9/L    Eosinophils Absolute 0.26 0.05 - 0.50 E9/L    Basophils Absolute 0.03 0.00 - 0.20 E9/L   Comprehensive Metabolic Panel   Result Value Ref Range    Sodium 141 132 - 146 mmol/L    Potassium 3.5 3.5 - 5.0 mmol/L    Chloride 106 98 - 107 mmol/L    CO2 24 22 - 29 mmol/L    Anion Gap 11 7 - 16 mmol/L    Glucose 111 (H) 74 - 99 mg/dL    BUN 22 6 - 23 mg/dL    Creatinine 1.4 (H) 0.7 - 1.2 mg/dL    Est, Glom Filt Rate 53 >=60 mL/min/1.73    Calcium 9.2 8.6 - 10.2 mg/dL    Total Protein 6.8 6.4 - 8.3 g/dL    Albumin 3.9 3.5 - 5.2 g/dL    Total Bilirubin 0.4 0.0 - 1.2 mg/dL    Alkaline Phosphatase 47 40 - 129 U/L    ALT 23 0 - 40 U/L    AST 17 0 - 39 U/L   Troponin   Result Value Ref Range    Troponin, High Sensitivity 19 (H) 0 - 11 ng/L   Lactic Acid   Result Value Ref Range    Lactic Acid 1.7 0.5 - 2.2 mmol/L   Lipase   Result Value Ref Range    Lipase 39 13 - 60 U/L   Troponin   Result Value Ref Range    Troponin, High Sensitivity 17 (H) 0 - 11 ng/L   EKG 12 Lead   Result Value Ref Range    Ventricular Rate 85 BPM    Atrial Rate 85 BPM    P-R Interval 132 ms    QRS Duration 70 ms    Q-T Interval 404 ms    QTc Calculation (Bazett) 480 ms    P Axis 33 degrees    R Axis -2 degrees    T Axis 24 degrees   EKG 12 Lead   Result Value Ref Range    Ventricular Rate 74 BPM    Atrial Rate 74 BPM    P-R Interval 138 ms    QRS Duration 70 ms    Q-T Interval 410 ms    QTc Calculation (Bazett) 455 ms    P Axis 22 degrees    R Axis 0 degrees    T Axis 11 degrees       RADIOLOGY:  Interpreted by Radiologist.  CT ABDOMEN PELVIS W IV CONTRAST Additional Contrast? None   Final Result   1. No acute disease. RECOMMENDATIONS:   Careful clinical correlation and follow up recommended. XR CHEST PORTABLE   Final Result   1. Left basilar opacity suggesting atelectasis or other airspace disease. RECOMMENDATION:   Careful clinical correlation and follow up recommended. CTA CHEST W CONTRAST    (Results Pending)       EKG: This EKG is signed and interpreted by me. Rate: 85  Rhythm: Sinus  Interpretation: non-specific EKG, noted bigeminy  Comparison: Compared to previous  REPEAT EKG: This EKG is signed and interpreted by ED Physician. Rate: 74  Rhythm: Sinus. Interpretation: no acute changes. Comparison: Improved compared to prior EKG.      ------------------------- NURSING NOTES AND VITALS REVIEWED ---------------------------   The nursing notes within the ED encounter and vital signs as below have been reviewed by myself. BP (!) 174/99   Pulse 74   Temp 97.8 °F (36.6 °C) (Oral)   Resp 18   Ht 5' 3\" (1.6 m)   Wt 172 lb (78 kg)   SpO2 98%   BMI 30.47 kg/m²   Oxygen Saturation Interpretation: Normal    The patients available past medical records and past encounters were reviewed. ------------------------------ ED COURSE/MEDICAL DECISION MAKING----------------------  Medications   ondansetron (ZOFRAN) injection 4 mg (4 mg IntraVENous Given 10/20/22 0427)   pantoprazole (PROTONIX) injection 40 mg (40 mg IntraVENous Given 10/20/22 0427)   iopamidol (ISOVUE-370) 76 % injection 75 mL (75 mLs IntraVENous Given 10/20/22 0407)   aluminum & magnesium hydroxide-simethicone (MAALOX) 30 mL, lidocaine viscous hcl (XYLOCAINE) 5 mL (GI COCKTAIL) ( Oral Given 10/20/22 8928)             Medical Decision Making:   Patient presenting here because of history of nausea vomiting. Patient also reporting pains in his chest as well as sore throat. Patient reports the pain in his chest is going down his left arm as well as his back. Patient reporting some mild upper abdominal pain. Patient does have history of hiatal hernia he reports history of EGD done recently.   Patient stress test noted reviewed there is no acute findings on the stress test.  I did also review his CT of his chest there is no signs of PE or infiltrate. His chest x-ray today does show suspected infiltrate in his left lower lobe but I believe it is related to this hiatal hernia. Patient medicated here with no improvement of his symptoms. Patient's pulse ox stable. Patient labs reviewed. EKG also noted patient was in bigeminy on initial EKG. Patient rechecked several times appears to be in sinus rhythm at present time. Re-Evaluations:             Patient rate evaluated several times in the emergency department. Patient not improved with any medications given here. Patient still reporting some chest discomfort. Consultations:                 Critical Care: This patient's ED course included: a personal history and physicial eaxmination    This patient has been closely monitored during their ED course. Counseling: The emergency provider has spoken with the patient and discussed todays results, in addition to providing specific details for the plan of care and counseling regarding the diagnosis and prognosis. Questions are answered at this time and they are agreeable with the plan.       --------------------------------- IMPRESSION AND DISPOSITION ---------------------------------    IMPRESSION  1. Chest pain, unspecified type    2. Bigeminy    3. Nausea and vomiting, unspecified vomiting type        DISPOSITION  Disposition: admit  Patient condition is stable        NOTE: This report was transcribed using voice recognition software.  Every effort was made to ensure accuracy; however, inadvertent computerized transcription errors may be present          Estela Herr MD  10/20/22 2484

## 2022-10-20 NOTE — CARE COORDINATION
Patient rsies at [facility] as a senior care house. Spoke with Derek Deshaniqua at  ext [phone] regarding patient's admission under observation and medications. She is to be notified when patient is discharged. Received phone call from  patient's , [facility] 1 [phone] regarding that half way house may not be able to care for patient at discharge. Patient presented to the ED for history of nausea vomiting as well as chest pain and sore  throat. Surgery consulted for severe GERD and hiatal hernia. Started on Iv pepcid. Clear liquid diet. Cardiology consulted for clearance for possible surgery. Attempted to meet with patient at bedside and nurse practitioner was with patient. Patient will need seen by SW/CM in am to discuss choices for SAYDA. Patient is now NPO. Will need PT/OT post op.

## 2022-10-20 NOTE — LETTER
[address] Medicaid  CERTIFICATION OF NECESSITY  FOR TRANSPORTATION   BY WHEELCHAIR VAN     Individual Information   1. Name: Socorro Ramos 2. [place] Medicaid Billing Number:    3. Address: [address]  [address] [place] [address] Dr Bansal Provider Information   4. Provider Name:    5. [place] Medicaid Provider Number:  National Provider Identifier (NPI):      Certification  7. Criteria:  By signing this document, the practitioner certifies that two statements are true:  A. This individual must be accompanied by a mobility-related assistive device from the point of pick-up to the point of drop-off. B. Transport of this individual by standard passenger vehicle or common carrier is precluded or contraindicated. 8. Period Beginning Date:    5. Length  [x] Not more than 1 day(s)  [] One Year     Additional Information Relevant to Certification   10. Comments or Explanations, If Necessary or Appropriate   incarcerated paraesophageal hernia s/p laparoscopic robotic assisted paraesophageal hernia repair with gastropexy and gastrostomy tube placement          Certifying Practitioner Information   11. Name of Practitioner:  Dr Cody Curry   12. [place] Medicaid Provider Number, If Applicable:  [address] Provider Identifier (NPI):     Signature Information   14. Date of Signature:  13. Name of Person Signin. Signature and Professional Designation:     [facility] [id number]  Rev. 2015   [address] [place] [address] Encounter Date/Time: 10/20/2022 Buzz Gore 26 Account: [de-identified]    MRN: [id number]    Patient: Elen Armstrong Serial #: [id number]      ENCOUNTER          Patient Class: I Private Enc? No Unit  BD: SEYZ [id number]/[id number]-B   Hospital Service: MED   Encounter DX: Bigeminy [I49.8]   ADM Provider: Chip Porter MD   Procedure:     ATT Provider: Nancy Strickland MD   REF Provider:        Admission DX:  Bigeminy, Chest pain, Chest pain, unspecified type, Nausea and vomiting, unspecified vomiting type, Hiatal hernia with GERD and DX codes: I49.8, R07.9, R07.9, R11.2, K44.9, K21.9      PATIENT                 Name: Jimmy Cesar : 1950 (71 yrs)   Address: [address] Sex: Male   Francisco Javier Toure [facility] [address]         Marital Status: Single   Employer: RETIRED         Christianity: Taoist   Primary Care Provider:           Primary Phone: [phone]   EMERGENCY CONTACT   Contact Name Legal Guardian? Relationship to Patient Home Phone Work Phone   1. *No Contact Specified*  2. *No Contact Specified*                             GUARANTOR            Guarantor: George Ramos     : 1950   Address: [address] [place] [address];[address] Sex: Male     [place] [address]     Relation to Patient: Self       Home Phone: [phone]   Guarantor ID: [id number]       Work Phone:     Guarantor Employer: RETIRED         Status: RETIRED      COVERAGE        PRIMARY INSURANCE   Payor: MEDICAID [place] Plan: MEDICAID [facility] DEPT OF*   Payor Address: [place] [address],  [facility] [address] [place] [address],[facility]       Group Number:   Insurance Type: INDEMNITY   Subscriber Name: Marco Goodwin : 1950   Subscriber ID: [id number] Pat. Rel. to Sub: Self   SECONDARY INSURANCE   Payor:   Plan:     Payor Address:  ,           Group Number:   Insurance Type:     Subscriber Name:   Subscriber :     Subscriber ID:   Pat.  Rel. to Sub:           CSN: [id number]

## 2022-10-20 NOTE — TELEPHONE ENCOUNTER
Writer contacted ED provider to inform of 30 day readmission risk. No Decision on disposition at this time.     Call Back: If you need to call back to inform of disposition you can contact me at [phone]

## 2022-10-20 NOTE — CONSULTS
(IMODIUM) 2 MG capsule Take 2 mg by mouth 4 times daily as needed for Diarrhea   Yes Historical Provider, MD   losartan (COZAAR) 25 MG tablet Take 1 tablet by mouth daily 10/15/22   Chance Norwood MD   pantoprazole (PROTONIX) 40 MG tablet Take 1 tablet by mouth 2 times daily (before meals) 10/14/22   Chance Norwood MD   sucralfate (CARAFATE) 1 GM tablet Take 1 tablet by mouth 4 times daily 10/14/22   Chance Norwood MD       No Known Allergies    No family history on file. Social History     Tobacco Use    Smoking status: Never    Smokeless tobacco: Never   Vaping Use    Vaping Use: Never used   Substance Use Topics    Alcohol use: Not Currently    Drug use: Never         Review of Systems   General ROS: negative  Hematological and Lymphatic ROS: negative  Respiratory ROS: Shortness of breath with activity  Cardiovascular ROS: negative, recent nuclear stress test was negative  Gastrointestinal ROS: Nausea vomiting, GERD  Genito-Urinary ROS: negative  Musculoskeletal ROS: Severe arthritis in bilateral knees, needs walker to be mobile      PHYSICAL EXAM:    Vitals:    10/20/22 1007   BP:    Pulse: 75   Resp:    Temp:    SpO2:        General Appearance:  awake, alert, oriented, in no acute distress  Skin:  Skin color, texture, turgor normal. No rashes or lesions. Head/face:  NCAT  Eyes:  PERRL  Lungs:  No chest wall tenderness. Heart:  Heart regular rate and rhythm  Abdomen:  Soft, non-tender, normal bowel sounds. Extremities: pulses present in all extremities      LABS:    CBC  Recent Labs     10/20/22  0302   WBC 6.1   HGB 13.6   HCT 41.0        BMP  Recent Labs     10/20/22  0302      K 3.5      CO2 24   BUN 22   CREATININE 1.4*   CALCIUM 9.2     Liver Function  Recent Labs     10/20/22  0302   LIPASE 39   BILITOT 0.4   AST 17   ALT 23   ALKPHOS 47   PROT 6.8   LABALBU 3.9     No results for input(s): LACTATE in the last 72 hours.   No results for input(s): INR, PTT in the last 72 hours.    Invalid input(s): PT    RADIOLOGY    CT ABDOMEN PELVIS W IV CONTRAST Additional Contrast? None    Result Date: 10/20/2022  EXAMINATION: CT OF THE ABDOMEN AND PELVIS WITH CONTRAST 10/20/2022 4:05 am TECHNIQUE: CT of the abdomen and pelvis was performed with the administration of intravenous contrast. Multiplanar reformatted images are provided for review. Automated exposure control, iterative reconstruction, and/or weight based adjustment of the mA/kV was utilized to reduce the radiation dose to as low as reasonably achievable. COMPARISON: None. HISTORY: ORDERING SYSTEM PROVIDED HISTORY: abdominal pain TECHNOLOGIST PROVIDED HISTORY: Reason for exam:->abdominal pain Additional Contrast?->None Decision Support Exception - unselect if not a suspected or confirmed emergency medical condition->Emergency Medical Condition (MA) What reading provider will be dictating this exam?->CRC FINDINGS: Lower Chest: Moderate retrocardiac hiatal hernia. Mild basilar atelectasis. No pleural or pericardial effusions. Organs: Liver, biliary tree, bilateral adrenal glands, bilateral kidneys, spleen and pancreas are normal. GI/Bowel: Mild descending and sigmoid colonic diverticulosis. No diverticulitis. Appendical Oth within the otherwise normal appendix. No bowel inflammatory process detected. No ileus or obstruction. Pelvis: No free fluid or adenopathy. Peritoneum/Retroperitoneum: Aortic atherosclerosis without aneurysm. No acute features. Bones/Soft Tissues: Mild lumbar spondylosis. 1. No acute disease. RECOMMENDATIONS: Careful clinical correlation and follow up recommended. XR CHEST PORTABLE    Result Date: 10/20/2022  EXAMINATION: ONE XRAY VIEW OF THE CHEST 10/20/2022 3:29 am COMPARISON: October 9, 2022 HISTORY: ORDERING SYSTEM PROVIDED HISTORY: chest pain TECHNOLOGIST PROVIDED HISTORY: Reason for exam:->chest pain What reading provider will be dictating this exam?->CRC FINDINGS: Shallow inspiration.   Increased left basilar opacity suggesting atelectasis or other airspace disease. This is newly developed in comparison to the prior examination referenced above. A small left effusion cannot be excluded. The right lung is clear. 1. Left basilar opacity suggesting atelectasis or other airspace disease. RECOMMENDATION: Careful clinical correlation and follow up recommended. ASSESSMENT:  66 y.o. male with severe GERD, hiatal hernia    PLAN:  Okay to continue clear liquid diet  CT imaging was reviewed consistent with hiatal hernia  PPI twice daily  Given QTC will start Reglan  Discussed case with Dr. Mark Paredes who performed an upper GI on this patient this month. At this time I would recommend hiatal hernia repair. A J-tube would help with patient's calories consumption however, would not fix underlying issue or relieve patient's severe symptoms of GERD. It appears that this hiatal hernia is the main culprit for patient's presenting issues  Will have cardiology evaluate for risk stratification and cardiac clearance--did have a nuclear stress test on October 8 that was negative however, patient does admit to shortness of breath when using his walker  Would ultimately prefer outpatient hiatal hernia repair.   However, if patient is unable to tolerate diet while on Reglan may require inpatient surgery  Discussed with      Electronically signed by Mercy Murillo DO on 10/20/22 at 2:01 PM EDT

## 2022-10-20 NOTE — CONSULTS
Inpatient Cardiology Consultation      Reason for Consult: Cardiac clearance    Consulting Physician: Dr. Melba Sheldon    Requesting Physician:  Ruth Simental DO    Date of Consultation: 10/20/2022    HISTORY OF PRESENT ILLNESS:   Patient is a 69-year-old male who is not known to [facility] cardiology. PMHx: Hypertension, large hiatal hernia, GERD, esophageal dysmotility, CKD stage III, bilateral knee arthritis with need for walker    HPI:  Patient presented to ER 10/20/2022 for nausea/vomiting, chest pain, and pharyngitis. The patient reports the chest pain also radiates to his neck and back as well as his left arm. Patient was recently admitted for PNA and had EGD and stress test.  He reports he has had nausea and vomiting since being released from the hospital.  Stress test at that time was negative. CXR this admission showed suspected infiltrate left lower lobe. Initial EKG showed bigeminy. Recent upper GI series showed distention of esophagus consistent with dysmotility and esophagitis with significant hiatal hernia. General surgery has been consulted and recommends hiatal hernia repair and would like a cardiology clearance prior to preferred outpatient procedure. Patient currently staying at a shelter house. VS at time of arrival 97.8, 20 respirations, 91 pulse, 200/92, 98% room air. Labs: Potassium 3.5, BUN 22, creatinine 1.4 (baseline 1.3-1.5), GFR 53, lactate 1.7, glucose 111, troponin 19> 17 (10/7: 13> 16), albumin 3.9, WBC 6.1, H&H 15.6/41.0, platelet 545  CXR: Left basilar opacity suggesting atelectasis or other airspace disease. CT abdomen: No acute disease. Upon assessment today 10/20/2022 patient is walking back to bed from bathroom on room air. Patient is alert oriented, speaking full sentences, no apparent distress at this time. The patient reports since being discharged 1 week prior he has had continued nausea/vomiting and intermittent chest pain after eating.   He reports the chest pain he is having is left-sided described as \"tingling\" and radiates to his left arm and neck. He reports it is the same chest pain as he had during last admission and has been going on for over a month. It is made worse after eating and made better with rest lying flat and not eating. Patient had stress test 10 days ago that was negative and chest pain was deemed GI. The patient also complains of chronic GONZALEZ and orthopnea which is chronic. He also reports some recent dizziness with walking and a mechanical fall. The patient did have recent bigeminy on the arrhythmia monitor. Patient reports he has never seen a cardiologist in the past.  Reports he is compliant with medication at home and is currently living at a [facility] house. Most recent VS 98.7, 16 respirations, 75 pulse, 186/92. RCRI 2 points, class III risk, 6.6% risk of major cardiac event. Please note: past medical records were reviewed per electronic medical record (EMR) - see detailed reports under Past Medical/ Surgical History. Past Medical History:    Hypertension  CVA  Large hiatal hernia  EGD 10/13/2022: Distal LA grade C esophagitis with no associated stricture. Large hiatal hernia with retained food contents. Normal stomach. Normal duodenum  GERD/esophageal dysmotility  CKD stage III  Bilateral knee arthritis    Cardiac testing:  NM stress 10/8/2022: Perfusion imaging normal without ischemia or infarct. NWMA. No transient ischemic dilation. Severe plaque burden based on noncontrast CT used for attenuation correction. Low risk. Medications Prior to admit:  Prior to Admission medications    Medication Sig Start Date End Date Taking?  Authorizing Provider   docusate sodium (COLACE) 100 MG capsule Take 100 mg by mouth daily   Yes Historical Provider, MD   loperamide (IMODIUM) 2 MG capsule Take 2 mg by mouth 4 times daily as needed for Diarrhea   Yes Historical Provider, MD   losartan (COZAAR) 25 MG tablet Take 1 tablet by mouth daily 10/15/22   Felipe De La Cruz MD   pantoprazole (PROTONIX) 40 MG tablet Take 1 tablet by mouth 2 times daily (before meals) 10/14/22   Felipe De La Cruz MD   sucralfate (CARAFATE) 1 GM tablet Take 1 tablet by mouth 4 times daily 10/14/22   Felipe De La Cruz MD       Current Medications:    Current Facility-Administered Medications: ondansetron (ZOFRAN) injection 4 mg, 4 mg, IntraVENous, Q6H PRN  losartan (COZAAR) tablet 100 mg, 100 mg, Oral, Daily  sucralfate (CARAFATE) tablet 1 g, 1 g, Oral, 4 times per day  famotidine (PEPCID) tablet 10 mg, 10 mg, Oral, BID  pantoprazole (PROTONIX) 40 mg in sodium chloride (PF) 10 mL injection, 40 mg, IntraVENous, Daily  aluminum & magnesium hydroxide-simethicone (MAALOX) 200-200-20 MG/5ML suspension 30 mL, 30 mL, Oral, Q6H PRN  metoclopramide (REGLAN) injection 10 mg, 10 mg, IntraVENous, Q6H    Allergies:  Patient has no known allergies. Social History:    Social History     Socioeconomic History    Marital status: Single     Spouse name: Not on file    Number of children: Not on file    Years of education: Not on file    Highest education level: Not on file   Occupational History    Not on file   Tobacco Use    Smoking status: Never    Smokeless tobacco: Never   Vaping Use    Vaping Use: Never used   Substance and Sexual Activity    Alcohol use: Not Currently    Drug use: Never    Sexual activity: Not on file   Other Topics Concern    Not on file   Social History Narrative    Not on file     Social Determinants of Health     Financial Resource Strain: Not on file   Food Insecurity: Not on file   Transportation Needs: Not on file   Physical Activity: Not on file   Stress: Not on file   Social Connections: Not on file   Intimate Partner Violence: Not on file   Housing Stability: Not on file       Family History:   No family history on file. REVIEW OF SYSTEMS:     Constitutional: Denies fevers, chills, night sweats.   Fatigue  HEENT: Denies headaches, nose bleeds, and blurred vision,oral pain, abscess or lesion. Musculoskeletal: Denies pain to BLE with ambulation and edema to BLE. Recent mechanical fall without injury  Neurological: Intermittent static dizziness. No other neurological deficits. Cardiovascular: Denies palpitations, and feelings of heart racing. Left-sided chest pain described as \"tingling\" after eating. Respiratory: Reports chronic GONZALEZ/orthopnea. Denies SOB at rest  Gastrointestinal: Denies heartburn,  diarrhea and constipation, black/bloody, and tarry stools. Recent continued nausea and vomiting  Genitourinary: Denies dysuria and hematuria  Hematologic: Denies excessive bruising or bleeding  Lymphatic: Denies lumps and bumps to neck, axilla, breast, and groin  Endocrine: Denies excessive thirst. Denies intolerance to hot and cold   Psychiatric: Denies anxiety and depression. PHYSICAL EXAM:   BP (!) 186/92   Pulse 75   Temp 98.7 °F (37.1 °C) (Oral)   Resp 16   Ht 5' 3\" (1.6 m)   Wt 172 lb (78 kg)   SpO2 98%   BMI 30.47 kg/m²   CONST:  Well developed, well nourished 2year-old  male who appears stated age. Awake, alert, cooperative, no apparent distress  HEENT:   Head- Normocephalic, atraumatic   Eyes- Conjunctivae pink, anicteric  Throat- Oral mucosa pink and moist  Neck-  No stridor, trachea midline, no jugular venous distention. No adenopathy   CHEST: Chest symmetrical and non-tender to palpation. No accessory muscle use or intercostal retractions  RESPIRATORY: Lung sounds - clear throughout fields   CARDIOVASCULAR:     No carotid bruit  Heart Inspection- shows no noted pulsations  Heart Palpation- no heaves or thrills; PMI is non-displaced   Heart Ausculation- Regular rate and rhythm, 3/6 systolic murmur. No s3, s4 or rub   PV: No lower extremity edema. No varicosities. Pedal pulses palpable, no clubbing. Cyanosis to lips  ABDOMEN: Soft, non-tender to light palpation. Bowel sounds present.  No palpable masses no organomegaly; no PRESENT ILLNESS:     Reviewed, as above      Past medical history:  Reviewed, as above. Past surgical history:  Reviewed, as above. Current medications:  Reviewed, as above    Allergies:  Reviewed, as above    Social history:  Reviewed, as above    Family medical history:  Reviewed, as above. REVIEW OF SYSTEMS:   Reviewed, as above. PHYSICAL EXAM:   CONSTITUTIONAL:  awake, alert, cooperative, no apparent distress, and appears stated age  EYES:  lids and lashes normal, anicteric sclerae  HEAD:  normocepalic, without obvious abnormality, atraumatic, pink, moist mucous membranes. NECK:  Supple, symmetrical, trachea midline, no adenopathy, thyroid symmetric, not enlarged and no tenderness, skin normal  HEMATOLOGIC/LYMPHATICS:  no cervical lymphadenopathy and no supraclavicular lymphadenopathy  LUNGS:  No increased work of breathing, good air exchange, clear to auscultation bilaterally, no crackles or wheezing  CARDIOVASCULAR:  Normal apical impulse, regular rate and rhythm, normal S1 and S2, no S3 or S4, 4/6 systolic murmur at the apex, 3/6 systolic murmur at the left lower sternal border, 3 no JVD, no carotid bruit, no pedal edema, good carotid upstroke bilaterally. ABDOMEN:  Soft, nontender, no masses, no hepatomegaly or splenomegaly, BS+  CHEST: nontender to palpation, expands symmetrically  MUSCULOSKELETAL:  No clubbing no cyanosis. there is no redness, warmth, or swelling of the joints  full range of motion noted  NEUROLOGIC:  Alert, awake. SKIN:  no bruising or bleeding, normal skin color, texture, turgor and no redness, warmth, or swelling    BP (!) 164/88   Pulse 80   Temp 98.4 °F (36.9 °C) (Oral)   Resp 18   Ht 5' 3\" (1.6 m)   Wt 172 lb (78 kg)   SpO2 98%   BMI 30.47 kg/m²       No intake/output data recorded. No intake/output data recorded.       DATA:   I personally reviewed the admission EKG with the following interpretation: Sinus rhythm with frequent PVCs and bigeminy formation    ECHO: Not performed to date  Stress Test: 10/8/2022,  1. ECG during the infusion did not change. 2.  The myocardial perfusion imaging was normal without ischemia or   infarct. 3.  Overall left ventricular systolic function was normal without   regional wall motion abnormalities. 4.  No transient ischemic dilatation. 5.  Severe plaque burden based on noncontrast chest CT used for   attenuation correction   6. Low risk general pharmacologic stress test.     Angiography: Not performed to date  Cardiology Labs: BMP:    Lab Results   Component Value Date/Time     10/20/2022 03:02 AM    K 3.5 10/20/2022 03:02 AM    K 3.7 10/13/2022 05:01 AM     10/20/2022 03:02 AM    CO2 24 10/20/2022 03:02 AM    BUN 22 10/20/2022 03:02 AM     CMP:    Lab Results   Component Value Date/Time     10/20/2022 03:02 AM    K 3.5 10/20/2022 03:02 AM    K 3.7 10/13/2022 05:01 AM     10/20/2022 03:02 AM    CO2 24 10/20/2022 03:02 AM    BUN 22 10/20/2022 03:02 AM    PROT 6.8 10/20/2022 03:02 AM     CBC:    Lab Results   Component Value Date/Time    WBC 6.1 10/20/2022 03:02 AM    RBC 4.32 10/20/2022 03:02 AM    HGB 13.6 10/20/2022 03:02 AM    HCT 41.0 10/20/2022 03:02 AM    MCV 94.9 10/20/2022 03:02 AM    RDW 13.4 10/20/2022 03:02 AM     10/20/2022 03:02 AM     PT/INR:  No results found for: PTINR  PT/INR Warfarin:  No components found for: PTPATWAR, PTINRWAR  PTT:    Lab Results   Component Value Date/Time    APTT 27.4 09/30/2022 03:20 PM     PTT Heparin:  No components found for: APTTHEP  Magnesium:  No results found for: MG  TSH:    Lab Results   Component Value Date/Time    TSH 1.490 10/06/2022 09:34 AM     TROPONIN:  No components found for: TROP  BNP:  No results found for: BNP  FASTING LIPID PANEL:  No results found for: CHOL, HDL, TRIG  CT ABDOMEN PELVIS W IV CONTRAST Additional Contrast? None   Final Result   1. No acute disease.       RECOMMENDATIONS:   Careful clinical correlation and follow up recommended. XR CHEST PORTABLE   Final Result   1. Left basilar opacity suggesting atelectasis or other airspace disease. RECOMMENDATION:   Careful clinical correlation and follow up recommended. I have personally reviewed the laboratory, cardiac diagnostic and radiographic testing as outlined above:    IMPRESSION:  PVCs: Asymptomatic, recent negative Lexiscan stress test on 10/8/2022. Chest pain: Noncardiac, suspect GI  Hypertension: Not well controlled, will adjust medications  History of CVA  Stage III chronic kidney disease  Preoperative cardiac evaluation    RECOMMENDATIONS:   Patient is at acceptable risk for perioperative cardiovascular event for the planned procedure (abdominal hernia repair surgery), patient may proceed without any further cardiac testing. Please feel free to call for any further information  Echocardiogram  Basic metabolic panel and CBC in a.m. Continue current treatment  Amlodipine 5 mg 1 by mouth daily  Further cardiac recommendations will be forthcoming pending his clinical course and diagnostic test findings    I have reviewed my findings and recommendations with patient  Discussed with the nursing staff    Thank you for the consult  Electronically signed by Donald Philippe MD on 10/20/2022 at 5:06 PM    All of the above was discussed with the patient  reviewing the above stated recommendations. I directly participated in the medical-decision making, ordering tests, and medication adjustments as documented today. I contributed >50% to the overall encounter time including face-to-face time providing counseling and or coordination of care with the other providers, reviewing records/tests, counseling/education of the patient, ordering medications/tests/procedures, coordinating care, and documenting clinical information in the EHR. I also discussed the differential diagnosis and all of the proposed management plans with the patient.      NOTE: This report

## 2022-10-20 NOTE — H&P
Hospitalist - History & Physical      Patient: Blake Alexander    Unit/Bed:6414/6414-A  YOB: 1950  MRN: [id number]   Acct: [de-identified]   PCP: No primary care provider on file. Date of Service: Pt seen/examined on 10/20/22  and Admitted to Observation with expected LOS less than two midnights due to medical therapy. Chief Complaint:  nausea and vomiting    Assessment and Plan:-  Patient with history of essential hypertension hiatal hernia severe gastroesophageal reflux disease probable esophageal dysmotility currently correction house resident presented with chest pain nausea vomiting    -Atypical chest pain recent stress test October 8 was negative. Chest pain likely related to severe gastroesophageal reflux disease and hiatal hernia  -Intractable nausea and vomiting due to severe gastroesophageal reflux disease and hiatal hernia  -Chronic kidney disease stage IIIa, creatinine 1.4  -Essential hypertension poorly controlled    Plan  Increase losartan to 100 mg once daily  PPI IV  Pepcid  Maalox as needed  Consult general surgery for evaluation of jejunostomy tube placement          History Of Present Illness:    Patient was recently hospitalized discharged on October 14. Patient presented with chest pain. CT chest showed large hiatal hernia. Upper GI series showed diffuse distention of the esophagus, severe esophageal dysmotility moderate sized hiatal hernia moderate to severe gastroesophageal reflux however examination was severely limited. EGD on October 13 distal LA grade C esophagitis irregular Z-line at 30 cm large hiatal hernia containing fluid with gastric pinch at 40 cm fibrous food debris in the fundus normal stomach and normal duodenum. Patient presented to emergency room this time with nausea vomiting chest pain sore throat over the past few days prior to her presentation.   He had vomiting since he left the hospital.  Patient had epigastric pain and chest pain described as acid reflux. Symptoms were aggravated by oral intake. No fever chills shortness of breath cough or diarrhea. Patient had nuclear stress test on October 8 which was negative. Patient currently resides in a longterm house. Past Medical History:        Diagnosis Date    Arthritis     Cerebral artery occlusion with cerebral infarction [facility])     Prostate disorder        Past Surgical History:        Procedure Laterality Date    ESOPHAGEAL DILATATION      UPPER GASTROINTESTINAL ENDOSCOPY N/A 10/13/2022    EGD ESOPHAGOGASTRODUODENOSCOPY performed by Maynor Bruno MD at [address] [place] [address] Medications:   No current facility-administered medications on file prior to encounter. Current Outpatient Medications on File Prior to Encounter   Medication Sig Dispense Refill    docusate sodium (COLACE) 100 MG capsule Take 100 mg by mouth daily      loperamide (IMODIUM) 2 MG capsule Take 2 mg by mouth 4 times daily as needed for Diarrhea      losartan (COZAAR) 25 MG tablet Take 1 tablet by mouth daily 30 tablet 3    pantoprazole (PROTONIX) 40 MG tablet Take 1 tablet by mouth 2 times daily (before meals) 180 tablet 1    sucralfate (CARAFATE) 1 GM tablet Take 1 tablet by mouth 4 times daily 120 tablet 1       Allergies:    Patient has no known allergies. Social History:    reports that he has never smoked. He has never used smokeless tobacco. He reports that he does not currently use alcohol. He reports that he does not use drugs. Family History:   Family history was reviewed with the patient, noncontributory    Diet:  ADULT DIET; Regular; [facility] [place] (GERD/Peptic Ulcer)    Review of systems:   Pertinent positives as noted in the HPI. All other systems reviewed and negative. PHYSICAL EXAM:  BP (!) 186/92   Pulse 75   Temp 98.7 °F (37.1 °C) (Oral)   Resp 16   Ht 5' 3\" (1.6 m)   Wt 172 lb (78 kg)   SpO2 98%   BMI 30.47 kg/m²   General appearance: No apparent distress, appears stated age and cooperative.   HEENT: Normal cephalic, atraumatic without obvious deformity. Pupils equal, round, and reactive to light. Extra ocular muscles intact. Conjunctivae/corneas clear. Neck: Supple, with full range of motion. No jugular venous distention. Trachea midline. Respiratory:  Normal respiratory effort. Clear to auscultation, bilaterally without Rales/Wheezes/Rhonchi. Cardiovascular: Regular rate and rhythm with normal S1/S2 without murmurs, rubs or gallops. Abdomen: Soft, non-tender, non-distended with normal bowel sounds. Musculoskeletal:  No clubbing, cyanosis or edema bilaterally. Skin: Skin color, texture, turgor normal.  No rashes or lesions. Neurologic:  Neurovascularly intact without any focal sensory/motor deficits. Cranial nerves: II-XII intact, grossly non-focal.  Psychiatric: Alert and oriented, thought content appropriate, normal insight  Capillary Refill: Brisk,< 3 seconds   Peripheral Pulses: +2 palpable, equal bilaterally     Labs:   Recent Labs     10/20/22  0302   WBC 6.1   HGB 13.6   HCT 41.0        Recent Labs     10/20/22  0302      K 3.5      CO2 24   BUN 22   CREATININE 1.4*   CALCIUM 9.2     Recent Labs     10/20/22  0302   AST 17   ALT 23   BILITOT 0.4   ALKPHOS 47     No results for input(s): INR in the last 72 hours. No results for input(s): Katarzyna [place] in the last 72 hours. Urinalysis:    Lab Results   Component Value Date/Time    NITRU Negative 10/06/2022 09:34 AM    WBCUA NONE 10/06/2022 09:34 AM    BACTERIA NONE SEEN 10/06/2022 09:34 AM    RBCUA NONE 10/06/2022 09:34 AM    BLOODU Negative 10/06/2022 09:34 AM    SPECGRAV >=1.030 10/06/2022 09:34 AM    GLUCOSEU Negative 10/06/2022 09:34 AM       Radiology:   CT ABDOMEN PELVIS W IV CONTRAST Additional Contrast? None   Final Result   1. No acute disease. RECOMMENDATIONS:   Careful clinical correlation and follow up recommended. XR CHEST PORTABLE   Final Result   1.  Left basilar opacity suggesting atelectasis or other airspace disease. RECOMMENDATION:   Careful clinical correlation and follow up recommended. CT ABDOMEN PELVIS W IV CONTRAST Additional Contrast? None    Result Date: 10/20/2022  EXAMINATION: CT OF THE ABDOMEN AND PELVIS WITH CONTRAST 10/20/2022 4:05 am TECHNIQUE: CT of the abdomen and pelvis was performed with the administration of intravenous contrast. Multiplanar reformatted images are provided for review. Automated exposure control, iterative reconstruction, and/or weight based adjustment of the mA/kV was utilized to reduce the radiation dose to as low as reasonably achievable. COMPARISON: None. HISTORY: ORDERING SYSTEM PROVIDED HISTORY: abdominal pain TECHNOLOGIST PROVIDED HISTORY: Reason for exam:->abdominal pain Additional Contrast?->None Decision Support Exception - unselect if not a suspected or confirmed emergency medical condition->Emergency Medical Condition (MA) What reading provider will be dictating this exam?->CRC FINDINGS: Lower Chest: Moderate retrocardiac hiatal hernia. Mild basilar atelectasis. No pleural or pericardial effusions. Organs: Liver, biliary tree, bilateral adrenal glands, bilateral kidneys, spleen and pancreas are normal. GI/Bowel: Mild descending and sigmoid colonic diverticulosis. No diverticulitis. Appendical Oth within the otherwise normal appendix. No bowel inflammatory process detected. No ileus or obstruction. Pelvis: No free fluid or adenopathy. Peritoneum/Retroperitoneum: Aortic atherosclerosis without aneurysm. No acute features. Bones/Soft Tissues: Mild lumbar spondylosis. 1. No acute disease. RECOMMENDATIONS: Careful clinical correlation and follow up recommended.      XR CHEST PORTABLE    Result Date: 10/20/2022  EXAMINATION: ONE XRAY VIEW OF THE CHEST 10/20/2022 3:29 am COMPARISON: October 9, 2022 HISTORY: ORDERING SYSTEM PROVIDED HISTORY: chest pain TECHNOLOGIST PROVIDED HISTORY: Reason for exam:->chest pain What reading provider will be dictating this exam?->CRC FINDINGS: Shallow inspiration. Increased left basilar opacity suggesting atelectasis or other airspace disease. This is newly developed in comparison to the prior examination referenced above. A small left effusion cannot be excluded. The right lung is clear. 1. Left basilar opacity suggesting atelectasis or other airspace disease. RECOMMENDATION: Careful clinical correlation and follow up recommended.          EKG: normal sinus rhythm, no blocks or conduction defects, no ischemic changes    Electronically signed by Dianelys Quintanilla MD on 10/20/2022 at 12:35 PM

## 2022-10-20 NOTE — PLAN OF CARE
Problem: Discharge Planning  Goal: Discharge to home or other facility with appropriate resources  Outcome: Progressing  Flowsheets (Taken 10/20/2022 1007)  Discharge to home or other facility with appropriate resources:   Identify barriers to discharge with patient and caregiver   Identify discharge learning needs (meds, wound care, etc)   Arrange for needed discharge resources and transportation as appropriate     Problem: Pain  Goal: Verbalizes/displays adequate comfort level or baseline comfort level  Outcome: Progressing     Problem: Chronic Conditions and Co-morbidities  Goal: Patient's chronic conditions and co-morbidity symptoms are monitored and maintained or improved  Outcome: Progressing     Problem: Safety - Adult  Goal: Free from fall injury  Outcome: Progressing     Problem: Skin/Tissue Integrity  Goal: Absence of new skin breakdown  Description: 1. Monitor for areas of redness and/or skin breakdown  2. Assess vascular access sites hourly  3. Every 4-6 hours minimum:  Change oxygen saturation probe site  4. Every 4-6 hours:  If on nasal continuous positive airway pressure, respiratory therapy assess nares and determine need for appliance change or resting period.   Outcome: Progressing

## 2022-10-21 PROBLEM — I35.0 NONRHEUMATIC AORTIC VALVE STENOSIS: Status: ACTIVE | Noted: 2022-10-21

## 2022-10-21 PROBLEM — I36.1 NONRHEUMATIC TRICUSPID VALVE REGURGITATION: Status: ACTIVE | Noted: 2022-10-21

## 2022-10-21 PROBLEM — I27.20 PULMONARY HTN (HCC): Status: ACTIVE | Noted: 2022-10-21

## 2022-10-21 LAB
ALBUMIN SERPL-MCNC: 3.8 G/DL (ref 3.5–5.2)
ANION GAP SERPL CALCULATED.3IONS-SCNC: 12 MMOL/L (ref 7–16)
BASOPHILS ABSOLUTE: 0.02 E9/L (ref 0–0.2)
BASOPHILS RELATIVE PERCENT: 0.3 % (ref 0–2)
BUN BLDV-MCNC: 22 MG/DL (ref 6–23)
CALCIUM SERPL-MCNC: 8.8 MG/DL (ref 8.6–10.2)
CHLORIDE BLD-SCNC: 107 MMOL/L (ref 98–107)
CO2: 24 MMOL/L (ref 22–29)
CREAT SERPL-MCNC: 1.5 MG/DL (ref 0.7–1.2)
EOSINOPHILS ABSOLUTE: 0.24 E9/L (ref 0.05–0.5)
EOSINOPHILS RELATIVE PERCENT: 4 % (ref 0–6)
GFR SERPL CREATININE-BSD FRML MDRD: 49 ML/MIN/1.73
GLUCOSE BLD-MCNC: 102 MG/DL (ref 74–99)
HCT VFR BLD CALC: 39.1 % (ref 37–54)
HEMOGLOBIN: 13.1 G/DL (ref 12.5–16.5)
IMMATURE GRANULOCYTES #: 0.02 E9/L
IMMATURE GRANULOCYTES %: 0.3 % (ref 0–5)
LV EF: 55 %
LVEF MODALITY: NORMAL
LYMPHOCYTES ABSOLUTE: 1.8 E9/L (ref 1.5–4)
LYMPHOCYTES RELATIVE PERCENT: 29.8 % (ref 20–42)
MCH RBC QN AUTO: 31.6 PG (ref 26–35)
MCHC RBC AUTO-ENTMCNC: 33.5 % (ref 32–34.5)
MCV RBC AUTO: 94.4 FL (ref 80–99.9)
MONOCYTES ABSOLUTE: 0.59 E9/L (ref 0.1–0.95)
MONOCYTES RELATIVE PERCENT: 9.8 % (ref 2–12)
NEUTROPHILS ABSOLUTE: 3.38 E9/L (ref 1.8–7.3)
NEUTROPHILS RELATIVE PERCENT: 55.8 % (ref 43–80)
PDW BLD-RTO: 13.4 FL (ref 11.5–15)
PHOSPHORUS: 3.1 MG/DL (ref 2.5–4.5)
PLATELET # BLD: 235 E9/L (ref 130–450)
PMV BLD AUTO: 10.6 FL (ref 7–12)
POTASSIUM SERPL-SCNC: 4.1 MMOL/L (ref 3.5–5)
RBC # BLD: 4.14 E12/L (ref 3.8–5.8)
SODIUM BLD-SCNC: 143 MMOL/L (ref 132–146)
WBC # BLD: 6.1 E9/L (ref 4.5–11.5)

## 2022-10-21 PROCEDURE — 6370000000 HC RX 637 (ALT 250 FOR IP): Performed by: INTERNAL MEDICINE

## 2022-10-21 PROCEDURE — A4216 STERILE WATER/SALINE, 10 ML: HCPCS | Performed by: STUDENT IN AN ORGANIZED HEALTH CARE EDUCATION/TRAINING PROGRAM

## 2022-10-21 PROCEDURE — 97165 OT EVAL LOW COMPLEX 30 MIN: CPT

## 2022-10-21 PROCEDURE — 6360000002 HC RX W HCPCS: Performed by: EMERGENCY MEDICINE

## 2022-10-21 PROCEDURE — G0378 HOSPITAL OBSERVATION PER HR: HCPCS

## 2022-10-21 PROCEDURE — C9113 INJ PANTOPRAZOLE SODIUM, VIA: HCPCS | Performed by: STUDENT IN AN ORGANIZED HEALTH CARE EDUCATION/TRAINING PROGRAM

## 2022-10-21 PROCEDURE — 93306 TTE W/DOPPLER COMPLETE: CPT

## 2022-10-21 PROCEDURE — 97161 PT EVAL LOW COMPLEX 20 MIN: CPT

## 2022-10-21 PROCEDURE — 99254 IP/OBS CNSLTJ NEW/EST MOD 60: CPT | Performed by: STUDENT IN AN ORGANIZED HEALTH CARE EDUCATION/TRAINING PROGRAM

## 2022-10-21 PROCEDURE — 99232 SBSQ HOSP IP/OBS MODERATE 35: CPT | Performed by: INTERNAL MEDICINE

## 2022-10-21 PROCEDURE — 36415 COLL VENOUS BLD VENIPUNCTURE: CPT

## 2022-10-21 PROCEDURE — 97530 THERAPEUTIC ACTIVITIES: CPT

## 2022-10-21 PROCEDURE — 97535 SELF CARE MNGMENT TRAINING: CPT

## 2022-10-21 PROCEDURE — 6370000000 HC RX 637 (ALT 250 FOR IP): Performed by: STUDENT IN AN ORGANIZED HEALTH CARE EDUCATION/TRAINING PROGRAM

## 2022-10-21 PROCEDURE — 2580000003 HC RX 258: Performed by: STUDENT IN AN ORGANIZED HEALTH CARE EDUCATION/TRAINING PROGRAM

## 2022-10-21 PROCEDURE — 85025 COMPLETE CBC W/AUTO DIFF WBC: CPT

## 2022-10-21 PROCEDURE — 6360000002 HC RX W HCPCS: Performed by: STUDENT IN AN ORGANIZED HEALTH CARE EDUCATION/TRAINING PROGRAM

## 2022-10-21 PROCEDURE — 96376 TX/PRO/DX INJ SAME DRUG ADON: CPT

## 2022-10-21 PROCEDURE — 80069 RENAL FUNCTION PANEL: CPT

## 2022-10-21 RX ORDER — ACETAMINOPHEN 325 MG/1
650 TABLET ORAL EVERY 4 HOURS PRN
Status: DISCONTINUED | OUTPATIENT
Start: 2022-10-21 | End: 2022-10-26

## 2022-10-21 RX ORDER — AMLODIPINE BESYLATE 10 MG/1
10 TABLET ORAL DAILY
Status: DISCONTINUED | OUTPATIENT
Start: 2022-10-21 | End: 2022-11-01 | Stop reason: HOSPADM

## 2022-10-21 RX ORDER — PROCHLORPERAZINE EDISYLATE 5 MG/ML
10 INJECTION INTRAMUSCULAR; INTRAVENOUS EVERY 6 HOURS PRN
Status: DISCONTINUED | OUTPATIENT
Start: 2022-10-21 | End: 2022-11-01 | Stop reason: HOSPADM

## 2022-10-21 RX ORDER — METOCLOPRAMIDE HYDROCHLORIDE 5 MG/ML
5 INJECTION INTRAMUSCULAR; INTRAVENOUS EVERY 6 HOURS
Status: DISCONTINUED | OUTPATIENT
Start: 2022-10-21 | End: 2022-10-22

## 2022-10-21 RX ORDER — CARVEDILOL 6.25 MG/1
12.5 TABLET ORAL 2 TIMES DAILY WITH MEALS
Status: DISCONTINUED | OUTPATIENT
Start: 2022-10-21 | End: 2022-10-22

## 2022-10-21 RX ORDER — SENNA PLUS 8.6 MG/1
2 TABLET ORAL 2 TIMES DAILY
Status: DISCONTINUED | OUTPATIENT
Start: 2022-10-21 | End: 2022-10-28

## 2022-10-21 RX ADMIN — FAMOTIDINE 10 MG: 20 TABLET ORAL at 21:15

## 2022-10-21 RX ADMIN — METOCLOPRAMIDE 5 MG: 5 INJECTION, SOLUTION INTRAMUSCULAR; INTRAVENOUS at 21:14

## 2022-10-21 RX ADMIN — METOCLOPRAMIDE 5 MG: 5 INJECTION, SOLUTION INTRAMUSCULAR; INTRAVENOUS at 15:43

## 2022-10-21 RX ADMIN — CARVEDILOL 12.5 MG: 6.25 TABLET, FILM COATED ORAL at 17:56

## 2022-10-21 RX ADMIN — SODIUM CHLORIDE, PRESERVATIVE FREE 40 MG: 5 INJECTION INTRAVENOUS at 12:18

## 2022-10-21 RX ADMIN — ONDANSETRON 4 MG: 2 INJECTION INTRAMUSCULAR; INTRAVENOUS at 21:14

## 2022-10-21 RX ADMIN — METOCLOPRAMIDE 10 MG: 5 INJECTION, SOLUTION INTRAMUSCULAR; INTRAVENOUS at 08:32

## 2022-10-21 RX ADMIN — SENNOSIDES 17.2 MG: 8.6 TABLET, FILM COATED ORAL at 21:15

## 2022-10-21 RX ADMIN — FAMOTIDINE 10 MG: 20 TABLET ORAL at 08:32

## 2022-10-21 RX ADMIN — LOSARTAN POTASSIUM 100 MG: 50 TABLET, FILM COATED ORAL at 08:31

## 2022-10-21 RX ADMIN — SENNOSIDES 17.2 MG: 8.6 TABLET, FILM COATED ORAL at 08:44

## 2022-10-21 RX ADMIN — SUCRALFATE 1 G: 1 TABLET ORAL at 17:56

## 2022-10-21 RX ADMIN — METOCLOPRAMIDE 10 MG: 5 INJECTION, SOLUTION INTRAMUSCULAR; INTRAVENOUS at 04:11

## 2022-10-21 RX ADMIN — SUCRALFATE 1 G: 1 TABLET ORAL at 06:24

## 2022-10-21 RX ADMIN — AMLODIPINE BESYLATE 10 MG: 10 TABLET ORAL at 08:44

## 2022-10-21 RX ADMIN — SUCRALFATE 1 G: 1 TABLET ORAL at 12:18

## 2022-10-21 RX ADMIN — ACETAMINOPHEN 650 MG: 325 TABLET, FILM COATED ORAL at 22:57

## 2022-10-21 ASSESSMENT — PAIN SCALES - GENERAL: PAINLEVEL_OUTOF10: 0

## 2022-10-21 NOTE — CARE COORDINATION
Care Coordination   Pt [facility] this am is 17/24  and he ambulated 40 feet with a wheeled walker min assist. He may not meet skilled care criteria . He feels he does not get good [facility]. Left a vm for his  to call this cm. Spoke with Ihsan Meyer at  ext 148 she said that when he goes back to the jail house he stays one day and come right back to the hospital. So a referral was made to [facility] skilled facility as he has [facility]. Will await of accepted. Called to get ot  eval done.

## 2022-10-21 NOTE — PROGRESS NOTES
Physical Therapy  Physical Therapy Initial Evaluation    Name: Valentina Guerra  : 1950  MRN: [id number]      Date of Service: 10/21/2022    Evaluating PT:  Suze Li PT [id number]    Referring provider/PT Order:  PT Eval and Treat   10/20/22 1600  PT eval and treat        Angus Wadsworth MD     Room #:  3030/2194-V  Diagnosis:  Bigeminy [I49.8]  Chest pain [R07.9]  Chest pain, unspecified type [R07.9]  Nausea and vomiting, unspecified vomiting type [R11.2]  PMHx/PSHx:     has a past medical history of Arthritis, Cerebral artery occlusion with cerebral infarction [facility]), Primary hypertension, and Prostate disorder. has a past surgical history that includes Esophagus dilation and Upper gastrointestinal endoscopy (N/A, 10/13/2022). Procedure/Surgery:  none at this time. Precautions:  Falls,  FWB (full weight bearing)  Equipment Needs: To be determined,    SUBJECTIVE:    Patient lives [facility] as a residential house. States level entry. Bed is on 1 floor and bath is on 1 floor. Patient ambulated independently and with rollator  PTA. Equipment owned: Rollator,      OBJECTIVE:   Initial Evaluation  Date: 10/21/22 Treatment Short Term/ Long Term   Goals   AM-PAC 6 Clicks 69/81     Was pt agreeable to Eval/treatment? yes     Does pt have pain? Reporting abdominal discomfort. Bed Mobility  Rolling: SBA  Supine to sit:   SBA   Sit to supine: NT   Scooting: SBA   Rolling: Ind  Supine to sit: Ind  Sit to supine: Ind  Scooting: Ind   Transfers Sit to stand: Min to fww  Stand to sit: Min  Stand pivot: Min with fww  Sit to stand: Mod Ind  to fww  Stand to sit: Mod Ind    Stand pivot: Mod Ind  with fww   Ambulation    20 feet with fww Minx 2 reps. 100+ feet with Mod Ind  rollator.     Stair negotiation: ascended and descended  NT       Strength/ROM:     RLE grossly 4-/5  LLE grossly 4-/5  RLE AROM WFL  LLE AROM WFL    Balance:   Static Sitting: Ind  Dynamic Sitting: Ind  Static Standing: Min with fww  Dynamic Standing: Min with fww      Patient is Alert & Oriented x person, place, time, and situation and follows directions   Sensation:  Pt denies numbness and tingling to extremities  Edema:  none    Vitals:  RA 98%  Therapeutic Exercises:  Functional activity with fww. Patient education  Pt educated on role of Physical Therapy, risks of immobility, safety and plan of care,  importance of mobility while in hospital , and safety  and use of call light for safety. Patient response to education:   Pt verbalized understanding Pt demonstrated skill Pt requires further education in this area   yes yes Reminders     ASSESSMENT:    Conditions Requiring Skilled Therapeutic Intervention:    [x]Decreased strength     [x]Decreased ROM  [x]Decreased functional mobility  [x]Decreased balance   [x]Decreased endurance   [x]Decreased posture  []Decreased sensation  []Decreased coordination   []Decreased vision  [x]Decreased safety awareness   []Increased pain       Comments:    RN cleared patient for participation in therapy session. Patient was seen this date for PT evaluation. . Patient was agreeable to intervention. Results of the functional assessment are noted above. Upon entering the room patient was found supine in bed. Willing to participate in session. Transitioned to EOB with SBA. Sat EOB x 10 minutes to increase dynamic sitting balance and activity tolerance. Transfers and ambulation completed with fww. Typically uses device when home. Cues for safety. At end of session, patient in bedside chair with  call light and phone within reach,  all lines and tubes intact, nursing notified. This patient can benefit from the continuation of skilled PT  to maximize functional level and return to PLOF.      Treatment:  Patient completed and was instructed in the following treatment:      Bed mobility training - pt given verbal and tactile cues to facilitate proper sequencing and safety during rolling and supine>sit as well as provided with physical assistance to complete task    Assistive device training - pt educated on using [address] during gait, [address] approximation/negotiation, and hand placement during sit<>stand to [address]  STS and transfer training - educated on hand/foot placement, safety, and sequencing during STS and pivot transfers using assistive device  Gait training - verbal cues for  assistive device approximation/negotiation, upright posture, and safety during 90 and 180 degree turns during gait   Education in use of call light for safety  Vitals and symptoms were closely monitored throughout session. Pt's/ family goals      1. Return to home. Prognosis is good  for reaching above PT goals.     Patient and or family understand(s) diagnosis, prognosis, and plan of care.  yes,     PHYSICAL THERAPY PLAN OF CARE:    PT POC is established based on physician order and patient diagnosis     Diagnosis:  Bigeminy [I49.8]  Chest pain [R07.9]  Chest pain, unspecified type [R07.9]  Nausea and vomiting, unspecified vomiting type [R11.2]  Specific instructions for next treatment:  Sit EOB, postural exercises, Transfer to bedside chair, and Increase ambulation distance  Current Treatment Recommendations:     [x] Strengthening to improve independence with functional mobility   [x] ROM to improve independence with functional mobility   [x] Balance Training to improve static/dynamic balance and to reduce fall risk  [x] Endurance Training to improve activity tolerance during functional mobility   [x] Transfer Training to improve safety and independence with all functional transfers   [x] Gait Training to improve gait mechanics, endurance and asses need for appropriate assistive device  [x] Stair Training in preparation for safe discharge home and/or into the community when appropriate  [] Positioning to prevent skin breakdown and contractures  [x] Safety and Education Training   [] Patient/Caregiver Education   [] HEP  [] Gait Team to be added to POC  [] Other     PT long term treatment goals are located in above grid    Frequency of treatments: 2-5x/week x 1-2 weeks. Time in  0725  Time out  0815    Total Treatment Time  25 minutes     Evaluation Time includes thorough review of current medical information, gathering information on past medical history/social history and prior level of function, completion of standardized testing/informal observation of tasks, assessment of data and education on plan of care and goals.     CPT codes:  [x] Low Complexity PT evaluation 46229  [] Moderate Complexity PT evaluation 33984  [] High Complexity PT evaluation 72317  [] PT Re-evaluation 90455  [] Gait training 38687 - minutes  [] Manual therapy 57092  minutes  [x] Therapeutic activities 58129 25 minutes  [] Therapeutic exercises 58030 - minutes  [] Neuromuscular reeducation [id number] minutes     Jelena Lee, [id number] [facility]

## 2022-10-21 NOTE — PROGRESS NOTES
Occupational Therapy  OCCUPATIONAL THERAPY INITIAL EVALUATION    [address] [address] [address]      OVWB:                                                Patient Name: Jessica Cintron  MRN: [id number]  : 1950  Room: 2959/5991-J    Evaluating OT: Eloy Schultz OTR/L #[id number]     Referring Provider: Saul Land MD  Specific Provider Orders/Date: OT eval and treat 10/20/22    Diagnosis: Bigeminy [I49.8]  Chest pain [R07.9]  Chest pain, unspecified type [R07.9]  Nausea and vomiting, unspecified vomiting type [R11.2]   Pt admitted to hospital with nausea, vomiting and chest pain      Pertinent Medical History:  has a past medical history of Arthritis, Cerebral artery occlusion with cerebral infarction [facility]), Primary hypertension, and Prostate disorder.        Precautions:  Fall Risk    Assessment of current deficits    [x] Functional mobility  [x]ADLs  [x] Strength               []Cognition    [x] Functional transfers   [x] IADLs         [x] Safety Awareness   [x]Endurance    [] Fine Coordination              [x] Balance      [] Vision/perception   []Sensation     []Gross Motor Coordination  [] ROM  [] Delirium                   [] Motor Control     OT PLAN OF CARE   OT POC based on physician orders, patient diagnosis and results of clinical assessment    Frequency/Duration 1-3 days/wk for 2 weeks PRN   Specific OT Treatment Interventions to include:   * Instruction/training on adapted ADL techniques and AE recommendations to increase functional independence within precautions       * Training on energy conservation strategies, correct breathing pattern and techniques to improve independence/tolerance for self-care routine  * Functional transfer/mobility training/DME recommendations for increased independence, safety, and fall prevention  * Patient/Family education to increase follow through with safety techniques and functional independence  * Recommendation of environmental modifications for increased safety with functional transfers/mobility and ADLs  * Therapeutic exercise to improve motor endurance, ROM, and functional strength for ADLs/functional transfers  * Therapeutic activities to facilitate/challenge dynamic balance, stand tolerance for increased safety and independence with ADLs    Recommended Adaptive Equipment:  TBD     Home Living: Pt resides in a 1 story residential house with no WILBERT    Bathroom setup: walk-in shower     Equipment owned: rollator    Prior Level of Function: mod I with ADLs , mod I with IADLs; ambulated with rollator   Driving: no    Occupation: enjoys watching [employer]     Pain Level: Pt denies pain this session    Cognition: A&O: 4/4; Follows 2 step directions   Memory:  good   Sequencing:  good   Problem solving:  fair   Judgement/safety:  fair     Functional Assessment:  AM-PAC Daily Activity Raw Score: 16/24   Initial Eval Status  Date: 10/21/22 Treatment Status  Date: STGs = LTGs  Time frame: 10-14 days   Feeding Independent      Grooming Minimal Assist   Modified [place]    UB Dressing Minimal Assist   Modified [place]    LB Dressing Moderate Assist   Modified [place]    Bathing Moderate Assist  Modified [place]    Toileting Moderate Assist   Modified [place]    Bed Mobility  Supine to sit: Minimal Assist   Sit to supine: NT   Supine to sit: Modified [place]   Sit to supine: Modified [place]    Functional Transfers Minimal Assist   Modified [place]    Functional Mobility Minimal Assist     Ambulated to/from bathroom with w/w  Modified [place]    Balance Sitting:     Static:  SBA    Dynamic:SBA  Standing: min A at w/w     Activity Tolerance F  F+   Visual/  Perceptual Grossly wfl     Blind R eye               Hand Dominance right   Strength ROM Additional Info:    RUE   4/5 wfl good  and wfl FMC/dexterity noted during ADL tasks     LUE 4/5 wfl good  and wfl FMC/dexterity noted during ADL tasks     Hearing: wfl  Sensation:wfl  Tone: wfl  Edema:none noted     Comments: Upon arrival patient supine in bed and agreeable to OT session. Therapist educated pt on role of OT. At end of session, patient seated in chair with call light and phone within reach, all lines and tubes intact. Overall patient demonstrated decreased independence and safety during completion of ADL/functional transfer/mobility tasks. Pt would benefit from continued skilled OT to increase safety and independence with completion of ADL/IADL tasks for functional independence and quality of life. Treatment: OT treatment provided this date includes: Facilitation of bed mobility, sitting balance at EOB (impacting ADLs; addressing posture, weight shifting, dynamic reaching), functional transfers (various surfaces: bed, toilet, chair), standing tolerance tasks (addressing posture, balance and activity tolerance while incorporating light functional reaching; impacting ADLs and functional activity) and functional ambulation tasks with w/w (including to/ from bathroom and in preparation for item retrieval tasks; cuing on posture, w/w management and safety) - skilled cuing on hand placement, posture, body mechanics, energy conservation techniques and safety. Therapist facilitated self-care retraining: UB/LB self-care tasks (gown, socks), toileting tasks and standing grooming tasks while educating pt on modified techniques, posture, safety and energy conservation techniques. Skilled monitoring of HR, O2 sats and pts response to treatment. Rehab Potential: Good  for established goals     Patient / Family Goal: return home      Patient and/or family were instructed on functional diagnosis, prognosis/goals and OT plan of care. Demonstrated fair understanding.      Eval Complexity: Low    Time In: 735  Time Out: 815  Total Treatment Time: 25 minutes    Min Units   OT Eval Low 97165  x  1   OT Eval Medium [address]      OT Re-Eval [id number]       Therapeutic Ex [id number]       Therapeutic Activities 45124  12  1   ADL/Self Care 37402  13  1   Orthotic Management 23350       Manual [id number]     Neuro Re-Ed [id number]       Non-Billable Time          Evaluation Time additionally includes thorough review of current medical information, gathering information on past medical history/social history and prior level of function, interpretation of standardized testing/informal observation of tasks, assessment of data and development of plan of care and goals.           Page Crow OTR/L #[id number]

## 2022-10-21 NOTE — PROGRESS NOTES
Renal Dose Adjustment Policy (Generic)     This patient is on medication that requires renal, weight, and/or indication dose adjustment. Date Body Weight IBW  Adjusted BW SCr  CrCl Dialysis status   10/21/2022 172 lb (78 kg) Ideal body weight: 56.9 kg (125 lb 7.1 oz)  Adjusted ideal body weight: 65.3 kg (144 lb 1 oz) Serum creatinine: 1.5 mg/dL (H) 10/21/22 0457  Estimated creatinine clearance: 41 mL/min (A) N/a       Pharmacy has dose-adjusted the following medication(s):    Date Previous Order Adjusted Order   10/21/2022 Reglan 10mg 4x daily Reglan 5mg 4x daily       These changes were made per protocol according to the [facility]   Automatic Renal Dose Adjustment Policy. *Please note this dose may need readjusted if patient's condition changes. Please contact pharmacy with any questions regarding these changes.     AMANDA Lenz [facility]  10/21/2022  10:11 AM

## 2022-10-21 NOTE — PROGRESS NOTES
Hospitalist Progress Note      Patient:  Ramone Durand    Unit/Bed:6414/6414-A  YOB: 1950  MRN: [id number]   Acct: [de-identified]   PCP: No primary care provider on file. Date of Admission: 10/20/2022    Assessment/Plan:    Patient with history of essential hypertension hiatal hernia severe gastroesophageal reflux disease probable esophageal dysmotility currently senior living house resident presented with chest pain nausea and vomiting     -Atypical chest pain recent stress test October 8 was negative. Chest pain likely related to severe gastroesophageal reflux disease and hiatal hernia  -Intractable nausea and vomiting due to severe gastroesophageal reflux disease and hiatal hernia  -Chronic kidney disease stage IIIa, creatinine 1.4  -Essential hypertension poorly controlled     Plan  Continue losartan 100 mg once daily  Start amlodipine 10 milligrams once daily  Patient was started on carvedilol by cardiology  Patient to have hiatal hernia repair surgery on October 24    Chief Complaint: Nausea vomiting    Initial H and P:-    Patient was recently hospitalized discharged on October 14. Patient presented with chest pain. CT chest showed large hiatal hernia. Upper GI series showed diffuse distention of the esophagus, severe esophageal dysmotility moderate sized hiatal hernia moderate to severe gastroesophageal reflux however examination was severely limited. EGD on October 13 distal LA grade C esophagitis irregular Z-line at 30 cm large hiatal hernia containing fluid with gastric pinch at 40 cm fibrous food debris in the fundus normal stomach and normal duodenum. Patient presented to emergency room this time with nausea vomiting chest pain sore throat over the past few days prior to her presentation. He had vomiting since he left the hospital.  Patient had epigastric pain and chest pain described as acid reflux.   Symptoms were aggravated by oral intake. No fever chills shortness of breath cough or diarrhea. Patient had nuclear stress test on October 8 which was negative. Patient currently resides in a senior care house. Subjective (past 24 hours):     Patient continues to have nausea and vomiting. No chest pain or shortness of breath. No fever or chills. Past medical history, family history, social history and allergies reviewed again and is unchanged since admission. ROS (12 point review of systems completed. Pertinent positives noted. Otherwise ROS is negative)     Medications:  Reviewed    Infusion Medications   Scheduled Medications    senna  2 tablet Oral BID    amLODIPine  10 mg Oral Daily    metoclopramide  5 mg IntraVENous Q6H    carvedilol  12.5 mg Oral BID WC    losartan  100 mg Oral Daily    sucralfate  1 g Oral 4 times per day    famotidine  10 mg Oral BID    pantoprazole (PROTONIX) 40 mg injection  40 mg IntraVENous Q12H    [START ON 10/24/2022] ceFAZolin  2,000 mg IntraVENous On Call to OR     PRN Meds: prochlorperazine, ondansetron, aluminum & magnesium hydroxide-simethicone, perflutren lipid microspheres      Intake/Output Summary (Last 24 hours) at 10/21/2022 1433  Last data filed at 10/21/2022 1130  Gross per 24 hour   Intake --   Output 300 ml   Net -300 ml       Diet:  Diet NPO  ADULT DIET; Clear Liquid    Exam:  BP (!) 166/91   Pulse 95   Temp 98.5 °F (36.9 °C) (Oral)   Resp 18   Ht 5' 3\" (1.6 m)   Wt 172 lb (78 kg)   SpO2 97%   BMI 30.47 kg/m²   General appearance: No apparent distress, appears stated age and cooperative. HEENT: Pupils equal, round, and reactive to light. Conjunctivae/corneas clear. Neck: Supple, with full range of motion. No jugular venous distention. Trachea midline. Respiratory:  Normal respiratory effort. Clear to auscultation, bilaterally without Rales/Wheezes/Rhonchi. Cardiovascular: Regular rate and rhythm with normal S1/S2 without murmurs, rubs or gallops.   Abdomen: Soft, non-tender, non-distended with normal bowel sounds. Musculoskeletal: No gross deformities  Skin: Skin color, texture, turgor normal.  No rashes or lesions. Neurologic:  Neurovascularly intact without any focal sensory/motor deficits. Cranial nerves: II-XII intact, grossly non-focal.  Psychiatric: Alert and oriented, thought content appropriate, normal insight  Capillary Refill: Brisk,< 3 seconds   Peripheral Pulses: +2 palpable, equal bilaterally     Labs:   Recent Labs     10/20/22  0302 10/21/22  0457   WBC 6.1 6.1   HGB 13.6 13.1   HCT 41.0 39.1    235     Recent Labs     10/20/22  0302 10/21/22  0457    143   K 3.5 4.1    107   CO2 24 24   BUN 22 22   CREATININE 1.4* 1.5*   CALCIUM 9.2 8.8   PHOS  --  3.1     Recent Labs     10/20/22  0302   AST 17   ALT 23   BILITOT 0.4   ALKPHOS 47     No results for input(s): INR in the last 72 hours. No results for input(s): Pamla Clines in the last 72 hours. Microbiology:    Blood culture #1:   Lab Results   Component Value Date/Time    BC 5 Days no growth 10/06/2022 02:24 PM       Blood culture #2:  Lab Results   Component Value Date/Time    BLOODCULT2 5 Days no growth 10/06/2022 02:24 PM       Organism:No results found for: ORG    No results found for: LABGRAM    MRSA culture only:No results found for: [facility]    Urine culture: No results found for: LABURIN    Respiratory culture: No results found for: CULTRESP    Aerobic and Anaerobic :  No results found for: LABAERO  No results found for: LABANAE    Urinalysis:      Lab Results   Component Value Date/Time    NITRU Negative 10/06/2022 09:34 AM    WBCUA NONE 10/06/2022 09:34 AM    BACTERIA NONE SEEN 10/06/2022 09:34 AM    RBCUA NONE 10/06/2022 09:34 AM    BLOODU Negative 10/06/2022 09:34 AM    SPECGRAV >=1.030 10/06/2022 09:34 AM    GLUCOSEU Negative 10/06/2022 09:34 AM       Radiology:  CT ABDOMEN PELVIS W IV CONTRAST Additional Contrast? None   Final Result   1. No acute disease.       RECOMMENDATIONS: Careful clinical correlation and follow up recommended. XR CHEST PORTABLE   Final Result   1. Left basilar opacity suggesting atelectasis or other airspace disease. RECOMMENDATION:   Careful clinical correlation and follow up recommended. CT ABDOMEN PELVIS W IV CONTRAST Additional Contrast? None    Result Date: 10/20/2022  EXAMINATION: CT OF THE ABDOMEN AND PELVIS WITH CONTRAST 10/20/2022 4:05 am TECHNIQUE: CT of the abdomen and pelvis was performed with the administration of intravenous contrast. Multiplanar reformatted images are provided for review. Automated exposure control, iterative reconstruction, and/or weight based adjustment of the mA/kV was utilized to reduce the radiation dose to as low as reasonably achievable. COMPARISON: None. HISTORY: ORDERING SYSTEM PROVIDED HISTORY: abdominal pain TECHNOLOGIST PROVIDED HISTORY: Reason for exam:->abdominal pain Additional Contrast?->None Decision Support Exception - unselect if not a suspected or confirmed emergency medical condition->Emergency Medical Condition (MA) What reading provider will be dictating this exam?->CRC FINDINGS: Lower Chest: Moderate retrocardiac hiatal hernia. Mild basilar atelectasis. No pleural or pericardial effusions. Organs: Liver, biliary tree, bilateral adrenal glands, bilateral kidneys, spleen and pancreas are normal. GI/Bowel: Mild descending and sigmoid colonic diverticulosis. No diverticulitis. Appendical Oth within the otherwise normal appendix. No bowel inflammatory process detected. No ileus or obstruction. Pelvis: No free fluid or adenopathy. Peritoneum/Retroperitoneum: Aortic atherosclerosis without aneurysm. No acute features. Bones/Soft Tissues: Mild lumbar spondylosis. 1. No acute disease. RECOMMENDATIONS: Careful clinical correlation and follow up recommended.      XR CHEST PORTABLE    Result Date: 10/20/2022  EXAMINATION: ONE XRAY VIEW OF THE CHEST 10/20/2022 3:29 am COMPARISON: October 9, 2022 HISTORY: ORDERING SYSTEM PROVIDED HISTORY: chest pain TECHNOLOGIST PROVIDED HISTORY: Reason for exam:->chest pain What reading provider will be dictating this exam?->CRC FINDINGS: Shallow inspiration. Increased left basilar opacity suggesting atelectasis or other airspace disease. This is newly developed in comparison to the prior examination referenced above. A small left effusion cannot be excluded. The right lung is clear. 1. Left basilar opacity suggesting atelectasis or other airspace disease. RECOMMENDATION: Careful clinical correlation and follow up recommended.        Electronically signed by Mary Miller MD on 10/21/2022 at 2:33 PM

## 2022-10-21 NOTE — PROGRESS NOTES
GENERAL SURGERY  DAILY PROGRESS NOTE  10/21/2022    Subjective:  Feeling better since yesterday. Still nauseated. Vomited x1 last night. No BM. Ambulating very short distances. The patient has had prior open esophageal surgery / hiatal hernia repair in the 70's. Objective:  BP (!) 163/88   Pulse 78   Temp 98.5 °F (36.9 °C) (Oral)   Resp 18   Ht 5' 3\" (1.6 m)   Wt 172 lb (78 kg)   SpO2 97%   BMI 30.47 kg/m²     General Appearance:  awake, alert, oriented, in no acute distress  Skin:  Skin color, texture, turgor normal  Head/face:  NCAT  Eyes:  No gross abnormalities. Sclera nonicteric  Lungs/Chest:  Normal expansion. No respiratory distress. On room air  Heart: Warm throughout. Regular rate   Abdomen:  Soft, minimal tenderness, non distended. Well healed surgical scar  Extremities: Extremities warm to touch, pink      I have personally reviewed all relevant labs and imaging. Assessment/Plan:  66 y.o. male hiatal hernia, severe GERD. Prior open hiatal hernia repair in the 70's.  Asymptomatic PVC's    - PPI BID  - Reglan  - per cardiologist, patient is acceptable risk for surgery  - echocardiogram ordered by cardiologist  - will attempt robotic assisted hiatal hernia repair 10/24, if dissection is unsafe due to surgical history, will perform gastropexy with gastrostomy tube insertion instead    Electronically signed by Darnell Fabian DO on 10/21/2022 at 7:57 AM

## 2022-10-21 NOTE — PROGRESS NOTES
Patient is seen in follow-up for cardiac clearance.     Subjective:     Mr. Rakesh Banks feels okay today, no chest pain or shortness of breath  Laying in bed no apparent distress    ROS:  CONSTITUTIONAL:  negative for  fevers, chills  HEENT:  negative for earaches, nasal congestion and epistaxis  RESPIRATORY:  negative for  dry cough, cough with sputum,wheezing and hemoptysis  GASTROINTESTINAL:  negative for nausea, vomiting  MUSCULOSKELETAL:  negative for  myalgias, arthralgias  NEUROLOGICAL:  negative for visual disturbance, dysphagia    Medication side effects: none    Scheduled Meds:   senna  2 tablet Oral BID    amLODIPine  10 mg Oral Daily    metoclopramide  5 mg IntraVENous Q6H    losartan  100 mg Oral Daily    sucralfate  1 g Oral 4 times per day    famotidine  10 mg Oral BID    pantoprazole (PROTONIX) 40 mg injection  40 mg IntraVENous Q12H    [START ON 10/24/2022] ceFAZolin  2,000 mg IntraVENous On Call to OR     Continuous Infusions:  PRN Meds:prochlorperazine, ondansetron, aluminum & magnesium hydroxide-simethicone, perflutren lipid microspheres    I/O last 3 completed shifts:  In: -   Out: 200 [Urine:200]  I/O this shift:  In: -   Out: 100 [Urine:100]      Objective:      Physical Exam:   BP (!) 172/82   Pulse 77   Temp 98.1 °F (36.7 °C) (Oral)   Resp 16   Ht 5' 3\" (1.6 m)   Wt 172 lb (78 kg)   SpO2 96%   BMI 30.47 kg/m²   CONSTITUTIONAL:  awake, alert, cooperative, no apparent distress, and appears stated age  HEAD:  normocepalic, without obvious abnormality, atraumatic  NECK:  Supple, symmetrical, trachea midline, no adenopathy, thyroid symmetric, not enlarged and no tenderness, skin normal  LUNGS:  No increased work of breathing, No accessory muscle use or intercostal retractions, good air exchange, clear to auscultation bilaterally, no crackles or wheezing  CARDIOVASCULAR:  Normal apical impulse, regular rate and rhythm, normal S1 and S2, no S3 or S4, and no murmur noted, no edema, no JVD, no carotid bruit. ABDOMEN:  Soft, nontender, no masses, no hepatomegaly, no splenomegaly, BS+  MUSCULOSKELETAL:  No clubbing no cyanosis. there is no redness, warmth, or swelling of the joints  full range of motion noted  NEUROLOGIC:  Alert, awake,oriented x3  SKIN:  no bruising or bleeding, normal skin color, texture, turgor and no redness, warmth, or swelling      Cardiographics  I personally reviewed the telemetry monitor strips with the following interpretation: Sinus rhythm    Echocardiogram:  Summary   No previous echo for comparison. Technically adequate study. Left ventricle size is normal.   Mild asymmetric septal hypertrophy. Ejection fraction is visually estimated at 55%. No regional wall motion abnormalities seen. There is doppler evidence of stage II diastolic dysfunction. Physiologic and/or trace mitral regurgitation is present. Moderate aortic stenosis is present. Mild tricuspid regurgitation. RVSP is 41 mmHg. Pulmonary hypertension is mild . Imaging  CT ABDOMEN PELVIS W IV CONTRAST Additional Contrast? None   Final Result   1. No acute disease. RECOMMENDATIONS:   Careful clinical correlation and follow up recommended. XR CHEST PORTABLE   Final Result   1. Left basilar opacity suggesting atelectasis or other airspace disease. RECOMMENDATION:   Careful clinical correlation and follow up recommended.              Lab Review   Lab Results   Component Value Date/Time     10/21/2022 04:57 AM    K 4.1 10/21/2022 04:57 AM    K 3.7 10/13/2022 05:01 AM     10/21/2022 04:57 AM    CO2 24 10/21/2022 04:57 AM    BUN 22 10/21/2022 04:57 AM    CREATININE 1.5 10/21/2022 04:57 AM    GLUCOSE 102 10/21/2022 04:57 AM    CALCIUM 8.8 10/21/2022 04:57 AM     Lab Results   Component Value Date/Time    WBC 6.1 10/21/2022 04:57 AM    HGB 13.1 10/21/2022 04:57 AM    HCT 39.1 10/21/2022 04:57 AM    MCV 94.4 10/21/2022 04:57 AM     10/21/2022 04:57 AM     I have personally reviewed the laboratory, cardiac diagnostic and radiographic testing as outlined above:    Assessment:     PVCs: Asymptomatic, recent negative Lexiscan stress test on 10/8/2022, normal ejection fraction. Chest pain: Noncardiac, suspect GI  Aortic valve stenosis: Moderate   Tricuspid valve regurgitation: Mild   Pulmonary hypertension: Mild   hypertension: Not well controlled, Norvasc was increased to 10 mg, will start Coreg  History of CVA  Stage III chronic kidney disease  Preoperative cardiac evaluation      Recommendations: 1. Coreg 12.5 mg 1 by mouth twice daily with holding parameters   2. Continue the rest of medications  3. Basic metabolic panel and CBC in a.m.  4.  Active cardiac problems, will see as needed, thank you    Discussed with patient    Electronically signed by Rashida Hong MD on 10/21/2022 at 2:06 PM  NOTE: This report was transcribed using voice recognition software.  Every effort was made to ensure accuracy; however, inadvertent computerized transcription errors may be present

## 2022-10-22 PROBLEM — K44.9 HIATAL HERNIA WITH GERD: Status: ACTIVE | Noted: 2022-10-22

## 2022-10-22 PROBLEM — K21.9 HIATAL HERNIA WITH GERD: Status: ACTIVE | Noted: 2022-10-22

## 2022-10-22 LAB
ALBUMIN SERPL-MCNC: 3.9 G/DL (ref 3.5–5.2)
ANION GAP SERPL CALCULATED.3IONS-SCNC: 11 MMOL/L (ref 7–16)
BUN BLDV-MCNC: 17 MG/DL (ref 6–23)
CALCIUM SERPL-MCNC: 8.9 MG/DL (ref 8.6–10.2)
CHLORIDE BLD-SCNC: 106 MMOL/L (ref 98–107)
CO2: 23 MMOL/L (ref 22–29)
CREAT SERPL-MCNC: 1.5 MG/DL (ref 0.7–1.2)
GFR SERPL CREATININE-BSD FRML MDRD: 49 ML/MIN/1.73
GLUCOSE BLD-MCNC: 104 MG/DL (ref 74–99)
PHOSPHORUS: 3.2 MG/DL (ref 2.5–4.5)
POTASSIUM SERPL-SCNC: 4 MMOL/L (ref 3.5–5)
SODIUM BLD-SCNC: 140 MMOL/L (ref 132–146)
TROPONIN, HIGH SENSITIVITY: 16 NG/L (ref 0–11)

## 2022-10-22 PROCEDURE — 84484 ASSAY OF TROPONIN QUANT: CPT

## 2022-10-22 PROCEDURE — 6360000002 HC RX W HCPCS: Performed by: FAMILY MEDICINE

## 2022-10-22 PROCEDURE — 6360000002 HC RX W HCPCS: Performed by: STUDENT IN AN ORGANIZED HEALTH CARE EDUCATION/TRAINING PROGRAM

## 2022-10-22 PROCEDURE — 6370000000 HC RX 637 (ALT 250 FOR IP): Performed by: NURSE PRACTITIONER

## 2022-10-22 PROCEDURE — 6360000002 HC RX W HCPCS: Performed by: SURGERY

## 2022-10-22 PROCEDURE — 93005 ELECTROCARDIOGRAM TRACING: CPT | Performed by: FAMILY MEDICINE

## 2022-10-22 PROCEDURE — 6370000000 HC RX 637 (ALT 250 FOR IP): Performed by: INTERNAL MEDICINE

## 2022-10-22 PROCEDURE — 96376 TX/PRO/DX INJ SAME DRUG ADON: CPT

## 2022-10-22 PROCEDURE — 6370000000 HC RX 637 (ALT 250 FOR IP): Performed by: STUDENT IN AN ORGANIZED HEALTH CARE EDUCATION/TRAINING PROGRAM

## 2022-10-22 PROCEDURE — 36415 COLL VENOUS BLD VENIPUNCTURE: CPT

## 2022-10-22 PROCEDURE — A4216 STERILE WATER/SALINE, 10 ML: HCPCS | Performed by: STUDENT IN AN ORGANIZED HEALTH CARE EDUCATION/TRAINING PROGRAM

## 2022-10-22 PROCEDURE — 2140000000 HC CCU INTERMEDIATE R&B

## 2022-10-22 PROCEDURE — C9113 INJ PANTOPRAZOLE SODIUM, VIA: HCPCS | Performed by: STUDENT IN AN ORGANIZED HEALTH CARE EDUCATION/TRAINING PROGRAM

## 2022-10-22 PROCEDURE — 80069 RENAL FUNCTION PANEL: CPT

## 2022-10-22 PROCEDURE — 2580000003 HC RX 258: Performed by: STUDENT IN AN ORGANIZED HEALTH CARE EDUCATION/TRAINING PROGRAM

## 2022-10-22 RX ORDER — MORPHINE SULFATE 2 MG/ML
2 INJECTION, SOLUTION INTRAMUSCULAR; INTRAVENOUS ONCE
Status: COMPLETED | OUTPATIENT
Start: 2022-10-22 | End: 2022-10-22

## 2022-10-22 RX ORDER — METOCLOPRAMIDE HYDROCHLORIDE 5 MG/ML
10 INJECTION INTRAMUSCULAR; INTRAVENOUS EVERY 6 HOURS
Status: DISCONTINUED | OUTPATIENT
Start: 2022-10-22 | End: 2022-10-26

## 2022-10-22 RX ORDER — CARVEDILOL 25 MG/1
25 TABLET ORAL 2 TIMES DAILY WITH MEALS
Status: DISCONTINUED | OUTPATIENT
Start: 2022-10-22 | End: 2022-11-01 | Stop reason: HOSPADM

## 2022-10-22 RX ADMIN — FAMOTIDINE 10 MG: 20 TABLET ORAL at 10:08

## 2022-10-22 RX ADMIN — METOCLOPRAMIDE 5 MG: 5 INJECTION, SOLUTION INTRAMUSCULAR; INTRAVENOUS at 04:41

## 2022-10-22 RX ADMIN — SODIUM CHLORIDE, PRESERVATIVE FREE 40 MG: 5 INJECTION INTRAVENOUS at 00:15

## 2022-10-22 RX ADMIN — SODIUM CHLORIDE, PRESERVATIVE FREE 40 MG: 5 INJECTION INTRAVENOUS at 11:49

## 2022-10-22 RX ADMIN — AMLODIPINE BESYLATE 10 MG: 10 TABLET ORAL at 10:07

## 2022-10-22 RX ADMIN — SUCRALFATE 1 G: 1 TABLET ORAL at 06:11

## 2022-10-22 RX ADMIN — MORPHINE SULFATE 2 MG: 2 INJECTION, SOLUTION INTRAMUSCULAR; INTRAVENOUS at 22:23

## 2022-10-22 RX ADMIN — SUCRALFATE 1 G: 1 TABLET ORAL at 00:16

## 2022-10-22 RX ADMIN — ALUMINA, MAGNESIA, AND SIMETHICONE ORAL SUSPENSION REGULAR STRENGTH 30 ML: 1200; 1200; 120 SUSPENSION ORAL at 22:30

## 2022-10-22 RX ADMIN — CARVEDILOL 12.5 MG: 6.25 TABLET, FILM COATED ORAL at 10:08

## 2022-10-22 RX ADMIN — METOCLOPRAMIDE 10 MG: 5 INJECTION, SOLUTION INTRAMUSCULAR; INTRAVENOUS at 21:39

## 2022-10-22 RX ADMIN — PROCHLORPERAZINE EDISYLATE 10 MG: 5 INJECTION INTRAMUSCULAR; INTRAVENOUS at 22:30

## 2022-10-22 RX ADMIN — METOCLOPRAMIDE 10 MG: 5 INJECTION, SOLUTION INTRAMUSCULAR; INTRAVENOUS at 15:47

## 2022-10-22 RX ADMIN — METOCLOPRAMIDE 10 MG: 5 INJECTION, SOLUTION INTRAMUSCULAR; INTRAVENOUS at 10:07

## 2022-10-22 RX ADMIN — LOSARTAN POTASSIUM 100 MG: 50 TABLET, FILM COATED ORAL at 10:07

## 2022-10-22 RX ADMIN — SUCRALFATE 1 G: 1 TABLET ORAL at 11:49

## 2022-10-22 RX ADMIN — SENNOSIDES 17.2 MG: 8.6 TABLET, FILM COATED ORAL at 10:08

## 2022-10-22 RX ADMIN — FAMOTIDINE 10 MG: 20 TABLET ORAL at 21:39

## 2022-10-22 RX ADMIN — CARVEDILOL 25 MG: 25 TABLET, FILM COATED ORAL at 17:40

## 2022-10-22 RX ADMIN — SUCRALFATE 1 G: 1 TABLET ORAL at 17:40

## 2022-10-22 ASSESSMENT — PAIN DESCRIPTION - LOCATION: LOCATION: ABDOMEN

## 2022-10-22 ASSESSMENT — PAIN DESCRIPTION - ORIENTATION: ORIENTATION: LEFT;UPPER

## 2022-10-22 ASSESSMENT — PAIN SCALES - GENERAL
PAINLEVEL_OUTOF10: 0
PAINLEVEL_OUTOF10: 7
PAINLEVEL_OUTOF10: 2

## 2022-10-22 ASSESSMENT — PAIN DESCRIPTION - DESCRIPTORS: DESCRIPTORS: ACHING;CRAMPING;DISCOMFORT

## 2022-10-22 NOTE — PROGRESS NOTES
Telemetry unit called to notify me that pt was having bigeminy PVCs on the monitor. Pt went to the bathroom at this time and denies any chest pain, palpitations, or flutters. Dr. Velasco Staff notified of couplets. Pt does have frequent PVCs while laying still in bed. Couplets appear to have resolved after patient went back to bed. Will monitor.

## 2022-10-22 NOTE — PROGRESS NOTES
Pt is back to bigeminy PVCs while in bed at this  time. Pt's only latest activity was a bed bath assisted by PCA. HR is 80s on monitor but radial pulses are in 40s. Last BP is 177/109. He has nothing ordered PRN for hypertension. Pt is asymptomatic. Justin Issa NP notified. Nikki Henriquez ordered to monitor pt and she is to modify pt's carvedilol.

## 2022-10-22 NOTE — PLAN OF CARE
Problem: Discharge Planning  Goal: Discharge to home or other facility with appropriate resources  Outcome: Progressing     Problem: Pain  Goal: Verbalizes/displays adequate comfort level or baseline comfort level  Outcome: Progressing     Problem: Chronic Conditions and Co-morbidities  Goal: Patient's chronic conditions and co-morbidity symptoms are monitored and maintained or improved  Outcome: Progressing     Problem: Safety - Adult  Goal: Free from fall injury  Outcome: Progressing  Flowsheets (Taken 10/22/2022 2647)  Free From Fall Injury: Instruct family/caregiver on patient safety     Problem: Skin/Tissue Integrity  Goal: Absence of new skin breakdown  Description: 1. Monitor for areas of redness and/or skin breakdown  2. Assess vascular access sites hourly  3. Every 4-6 hours minimum:  Change oxygen saturation probe site  4. Every 4-6 hours:  If on nasal continuous positive airway pressure, respiratory therapy assess nares and determine need for appliance change or resting period.   Outcome: Progressing

## 2022-10-22 NOTE — PROGRESS NOTES
Hospitalist Progress Note      SYNOPSIS:     Patient was recently hospitalized discharged on . Patient presented with chest pain. CT chest showed large hiatal hernia. Upper GI series showed diffuse distention of the esophagus, severe esophageal dysmotility moderate sized hiatal hernia moderate to severe gastroesophageal reflux however examination was severely limited. EGD on  distal LA grade C esophagitis irregular Z-line at 30 cm large hiatal hernia containing fluid with gastric pinch at 40 cm fibrous food debris in the fundus normal stomach and normal duodenum. Patient presented to emergency room this time with nausea vomiting chest pain sore throat over the past few days prior to her presentation. He had vomiting since he left the hospital.  Patient had epigastric pain and chest pain described as acid reflux. Symptoms were aggravated by oral intake. No fever chills shortness of breath cough or diarrhea. Patient had nuclear stress test on  which was negative. Patient currently resides in a [facility] house. Pain was deemed as noncardiac and likely related to severe GERD in the setting of hiatal hernia. General surgery was consulted and there is plan for hiatal hernia repair on 10/24. Cardiology optimizing antihypertensives. SUBJECTIVE:    Patient seen and examined  Records reviewed. He complains of \"extreme discomfort\"- when clarified he states that it is due to his reflux. Stable overnight. No other overnight issues reported. Temp (24hrs), Av.2 °F (36.8 °C), Min:98.1 °F (36.7 °C), Max:98.5 °F (36.9 °C)    DIET: Diet NPO  ADULT DIET;  Clear Liquid  CODE: Prior    Intake/Output Summary (Last 24 hours) at 10/22/2022 1047  Last data filed at 10/21/2022 1130  Gross per 24 hour   Intake --   Output 100 ml   Net -100 ml       Review of Systems  All bolded are positive; please see HPI  General:  Fever, chills, diaphoresis, fatigue, malaise, night sweats, weight loss  Psychological:  Anxiety, disorientation, hallucinations. ENT:  Epistaxis, headaches, vertigo, visual changes. Cardiovascular:  Chest pain, irregular heartbeats, palpitations, paroxysmal nocturnal dyspnea. Respiratory:  Shortness of breath, coughing, sputum production, hemoptysis, wheezing, orthopnea. Gastrointestinal:  Nausea, vomiting, diarrhea, heartburn, constipation, abdominal pain, hematemesis, hematochezia, melena, acholic stools  Genito-Urinary:  Dysuria, urgency, frequency, hematuria  Musculoskeletal:  Joint pain, joint stiffness, joint swelling, muscle pain  Neurology:  Headache, focal neurological deficits, weakness, numbness, paresthesia  Derm:  Rashes, ulcers, excoriations, bruising  Extremities:  Decreased ROM, peripheral edema, mottling      OBJECTIVE:    BP (!) 176/97   Pulse 82   Temp 98.1 °F (36.7 °C) (Oral)   Resp 19   Ht 5' 3\" (1.6 m)   Wt 172 lb (78 kg)   SpO2 98%   BMI 30.47 kg/m²     General appearance:  awake, alert, and oriented to person, place, time, and purpose; appears stated age and cooperative; no apparent distress no labored breathing  HEENT:  Conjunctivae/corneas clear. Neck: Supple. No jugular venous distention. Respiratory: symmetrical; clear to auscultation bilaterally; no wheezes; no rhonchi; no rales  Cardiovascular: rhythm regular; rate controlled; no murmurs  Abdomen: Soft, nontender, nondistended  Extremities:  peripheral pulses present; no peripheral edema; no ulcers  Musculoskeletal: No clubbing, cyanosis, no bilateral lower extremity edema. Brisk capillary refill.    Skin:  No rashes  on visible skin  Neurologic: awake, alert and following commands     Medications:  REVIEWED DAILY    Infusion Medications   Scheduled Medications    metoclopramide  10 mg IntraVENous Q6H    senna  2 tablet Oral BID    amLODIPine  10 mg Oral Daily    carvedilol  12.5 mg Oral BID WC    losartan  100 mg Oral Daily    sucralfate  1 g Oral 4 times per day    famotidine  10 mg Oral BID    pantoprazole (PROTONIX) 40 mg injection  40 mg IntraVENous Q12H    [START ON 10/24/2022] ceFAZolin  2,000 mg IntraVENous On Call to OR     PRN Meds: prochlorperazine, acetaminophen, ondansetron, aluminum & magnesium hydroxide-simethicone, perflutren lipid microspheres    Labs:     Recent Labs     10/20/22  0302 10/21/22  0457   WBC 6.1 6.1   HGB 13.6 13.1   HCT 41.0 39.1    235       Recent Labs     10/20/22  0302 10/21/22  0457 10/22/22  0653    143 140   K 3.5 4.1 4.0    107 106   CO2 24 24 23   BUN 22 22 17   CREATININE 1.4* 1.5* 1.5*   CALCIUM 9.2 8.8 8.9   PHOS  --  3.1 3.2       Recent Labs     10/20/22  0302   PROT 6.8   ALKPHOS 47   ALT 23   AST 17   BILITOT 0.4   LIPASE 39       No results for input(s): INR in the last 72 hours. No results for input(s): Lattie Nichole in the last 72 hours. Chronic labs:    Lab Results   Component Value Date    TSH 1.490 10/06/2022    INR 1.1 09/30/2022       Radiology: REVIEWED DAILY      ASSESSMENT and PLAN:    Chest pain  ACS ruled out  Likely 2/2 hiatal hernia and severe GERD    Hiatal hernia  S/p prior open repair in the 1970s per notes  On reglan Q6 hours   Plan for repair 10/24  He is looking a little dry- will consider start gentle IVF tomorrow if he is unable to tolerate the clear liquid diet    HTN  Hypertensive urgency on admission with /92 and mildly elevated high sensitivity troponin  Continue losartan, amlodipine  Started on carvedilol as well 10/21; BP remains quite elevated  Increase carvedilol from 12.5 to 25 mg today    CKD stage III  Baseline creatinine 1.3-1.5 mg/dL  Creatinine stable at baseline  No consistent proteinuria noted on UA  Recommend aggressive BP management and follow up outpatient    HFpEF 55% with stage II DD  Moderate MS, mild TR and mild pulmonary HTN    PVCs  Asymptomatic    Obesity  Body mass index is 30.47 kg/m².            DISPOSITION: Plan for OR 10/24; change status to inpatient- ok for medsurg    +++++++++++++++++++++++++++++++++++++++++++++++++  Bonnie RAYA/ Demetris Guo[address]  +++++++++++++++++++++++++++++++++++++++++++++++++  NOTE: This report was transcribed using voice recognition software. Every effort was made to ensure accuracy; however, inadvertent computerized transcription errors may be present.

## 2022-10-22 NOTE — PROGRESS NOTES
GENERAL SURGERY  DAILY PROGRESS NOTE  10/22/2022    Subjective:  Pt states that he feels tired, did not get much sleep because next-door neighbor was not overnight. Admits to some nausea and vomiting overnight. No other complaints this morning. The patient has had prior open esophageal surgery / hiatal hernia repair in the 70's. Objective:  BP (!) 176/97   Pulse 82   Temp 98.1 °F (36.7 °C) (Oral)   Resp 19   Ht 5' 3\" (1.6 m)   Wt 172 lb (78 kg)   SpO2 98%   BMI 30.47 kg/m²     General Appearance:  awake, alert, oriented, in no acute distress  Skin:  Skin color, texture, turgor normal  Head/face:  NCAT  Eyes:  No gross abnormalities. Sclera nonicteric  Lungs/Chest:  Normal expansion. No respiratory distress. On room air  Heart: Warm throughout. Regular rate   Abdomen:  Soft, minimal tenderness to palpation in LUQ, non distended. Well healed surgical scar  Extremities: Extremities warm to touch, pink    Assessment/Plan:  66 y.o. male hiatal hernia, severe GERD. Prior open hiatal hernia repair in the 70's.  Asymptomatic PVC's    - PPI BID  - Reglan  - per cardiologist, patient is acceptable risk for surgery  - Do not advance diet, continue clear liquids  - will attempt robotic assisted hiatal hernia repair 10/24, if dissection is unsafe due to surgical history, will perform gastropexy with gastrostomy tube insertion instead    Discussed with     Electronically signed by Jaye Mancilla DO on 10/22/2022 at 8:19 AM

## 2022-10-23 ENCOUNTER — ANESTHESIA EVENT (OUTPATIENT)
Dept: OPERATING ROOM | Age: 72
DRG: 220 | End: 2022-10-23
Payer: MEDICAID

## 2022-10-23 PROCEDURE — 6370000000 HC RX 637 (ALT 250 FOR IP): Performed by: STUDENT IN AN ORGANIZED HEALTH CARE EDUCATION/TRAINING PROGRAM

## 2022-10-23 PROCEDURE — 6360000002 HC RX W HCPCS: Performed by: STUDENT IN AN ORGANIZED HEALTH CARE EDUCATION/TRAINING PROGRAM

## 2022-10-23 PROCEDURE — 2140000000 HC CCU INTERMEDIATE R&B

## 2022-10-23 PROCEDURE — A4216 STERILE WATER/SALINE, 10 ML: HCPCS | Performed by: STUDENT IN AN ORGANIZED HEALTH CARE EDUCATION/TRAINING PROGRAM

## 2022-10-23 PROCEDURE — C9113 INJ PANTOPRAZOLE SODIUM, VIA: HCPCS | Performed by: STUDENT IN AN ORGANIZED HEALTH CARE EDUCATION/TRAINING PROGRAM

## 2022-10-23 PROCEDURE — 6370000000 HC RX 637 (ALT 250 FOR IP): Performed by: NURSE PRACTITIONER

## 2022-10-23 PROCEDURE — 6370000000 HC RX 637 (ALT 250 FOR IP): Performed by: INTERNAL MEDICINE

## 2022-10-23 PROCEDURE — 2580000003 HC RX 258: Performed by: STUDENT IN AN ORGANIZED HEALTH CARE EDUCATION/TRAINING PROGRAM

## 2022-10-23 PROCEDURE — 6360000002 HC RX W HCPCS: Performed by: SURGERY

## 2022-10-23 PROCEDURE — 99232 SBSQ HOSP IP/OBS MODERATE 35: CPT | Performed by: STUDENT IN AN ORGANIZED HEALTH CARE EDUCATION/TRAINING PROGRAM

## 2022-10-23 RX ORDER — HEPARIN SODIUM 10000 [USP'U]/ML
5000 INJECTION, SOLUTION INTRAVENOUS; SUBCUTANEOUS EVERY 8 HOURS SCHEDULED
Status: DISCONTINUED | OUTPATIENT
Start: 2022-10-23 | End: 2022-11-01 | Stop reason: HOSPADM

## 2022-10-23 RX ADMIN — ACETAMINOPHEN 650 MG: 325 TABLET, FILM COATED ORAL at 20:00

## 2022-10-23 RX ADMIN — FAMOTIDINE 10 MG: 20 TABLET ORAL at 08:37

## 2022-10-23 RX ADMIN — METOCLOPRAMIDE 10 MG: 5 INJECTION, SOLUTION INTRAMUSCULAR; INTRAVENOUS at 03:10

## 2022-10-23 RX ADMIN — HEPARIN SODIUM 5000 UNITS: 10000 INJECTION INTRAVENOUS; SUBCUTANEOUS at 21:00

## 2022-10-23 RX ADMIN — METOCLOPRAMIDE 10 MG: 5 INJECTION, SOLUTION INTRAMUSCULAR; INTRAVENOUS at 15:10

## 2022-10-23 RX ADMIN — LOSARTAN POTASSIUM 100 MG: 50 TABLET, FILM COATED ORAL at 08:31

## 2022-10-23 RX ADMIN — FAMOTIDINE 10 MG: 20 TABLET ORAL at 19:52

## 2022-10-23 RX ADMIN — SUCRALFATE 1 G: 1 TABLET ORAL at 00:00

## 2022-10-23 RX ADMIN — SENNOSIDES 17.2 MG: 8.6 TABLET, FILM COATED ORAL at 08:31

## 2022-10-23 RX ADMIN — CARVEDILOL 25 MG: 25 TABLET, FILM COATED ORAL at 08:31

## 2022-10-23 RX ADMIN — CARVEDILOL 25 MG: 25 TABLET, FILM COATED ORAL at 17:54

## 2022-10-23 RX ADMIN — METOCLOPRAMIDE 10 MG: 5 INJECTION, SOLUTION INTRAMUSCULAR; INTRAVENOUS at 19:52

## 2022-10-23 RX ADMIN — METOCLOPRAMIDE 10 MG: 5 INJECTION, SOLUTION INTRAMUSCULAR; INTRAVENOUS at 08:31

## 2022-10-23 RX ADMIN — SODIUM CHLORIDE, PRESERVATIVE FREE 40 MG: 5 INJECTION INTRAVENOUS at 00:01

## 2022-10-23 RX ADMIN — SUCRALFATE 1 G: 1 TABLET ORAL at 17:54

## 2022-10-23 RX ADMIN — AMLODIPINE BESYLATE 10 MG: 10 TABLET ORAL at 08:31

## 2022-10-23 RX ADMIN — ALUMINA, MAGNESIA, AND SIMETHICONE ORAL SUSPENSION REGULAR STRENGTH 30 ML: 1200; 1200; 120 SUSPENSION ORAL at 20:00

## 2022-10-23 RX ADMIN — HEPARIN SODIUM 5000 UNITS: 10000 INJECTION INTRAVENOUS; SUBCUTANEOUS at 08:31

## 2022-10-23 RX ADMIN — PROCHLORPERAZINE EDISYLATE 10 MG: 5 INJECTION INTRAMUSCULAR; INTRAVENOUS at 20:00

## 2022-10-23 RX ADMIN — SODIUM CHLORIDE, PRESERVATIVE FREE 40 MG: 5 INJECTION INTRAVENOUS at 12:36

## 2022-10-23 RX ADMIN — SUCRALFATE 1 G: 1 TABLET ORAL at 06:10

## 2022-10-23 RX ADMIN — SUCRALFATE 1 G: 1 TABLET ORAL at 12:36

## 2022-10-23 RX ADMIN — HEPARIN SODIUM 5000 UNITS: 10000 INJECTION INTRAVENOUS; SUBCUTANEOUS at 15:11

## 2022-10-23 ASSESSMENT — PAIN DESCRIPTION - DESCRIPTORS: DESCRIPTORS: ACHING

## 2022-10-23 ASSESSMENT — PAIN SCALES - GENERAL
PAINLEVEL_OUTOF10: 0
PAINLEVEL_OUTOF10: 3
PAINLEVEL_OUTOF10: 0

## 2022-10-23 ASSESSMENT — PAIN DESCRIPTION - LOCATION: LOCATION: HEAD

## 2022-10-23 NOTE — PROGRESS NOTES
Hospitalist Progress Note      SYNOPSIS:     Patient was recently hospitalized discharged on . Patient presented with chest pain. CT chest showed large hiatal hernia. Upper GI series showed diffuse distention of the esophagus, severe esophageal dysmotility moderate sized hiatal hernia moderate to severe gastroesophageal reflux however examination was severely limited. EGD on  distal LA grade C esophagitis irregular Z-line at 30 cm large hiatal hernia containing fluid with gastric pinch at 40 cm fibrous food debris in the fundus normal stomach and normal duodenum. Patient presented to emergency room this time with nausea vomiting chest pain sore throat over the past few days prior to her presentation. He had vomiting since he left the hospital.  Patient had epigastric pain and chest pain described as acid reflux. Symptoms were aggravated by oral intake. No fever chills shortness of breath cough or diarrhea. Patient had nuclear stress test on  which was negative. Patient currently resides in a shelter house. Pain was deemed as noncardiac and likely related to severe GERD in the setting of hiatal hernia. General surgery was consulted and there is plan for hiatal hernia repair on 10/24. Cardiology optimizing antihypertensives. SUBJECTIVE:    Patient seen and examined  Records reviewed. He reports feeling better today. Seen eating his lunch tray. Stable overnight. No other overnight issues reported. Temp (24hrs), Av.7 °F (36.5 °C), Min:97.3 °F (36.3 °C), Max:98 °F (36.7 °C)    DIET: ADULT DIET; Clear Liquid  Diet NPO  CODE: Prior  No intake or output data in the 24 hours ending 10/23/22 0904      Review of Systems  All bolded are positive; please see HPI  General:  Fever, chills, diaphoresis, fatigue, malaise, night sweats, weight loss  Psychological:  Anxiety, disorientation, hallucinations.   ENT:  Epistaxis, headaches, vertigo, visual changes. Cardiovascular:  Chest pain, irregular heartbeats, palpitations, paroxysmal nocturnal dyspnea. Respiratory:  Shortness of breath, coughing, sputum production, hemoptysis, wheezing, orthopnea. Gastrointestinal:  Nausea, vomiting, diarrhea, heartburn, constipation, abdominal pain, hematemesis, hematochezia, melena, acholic stools  Genito-Urinary:  Dysuria, urgency, frequency, hematuria  Musculoskeletal:  Joint pain, joint stiffness, joint swelling, muscle pain  Neurology:  Headache, focal neurological deficits, weakness, numbness, paresthesia  Derm:  Rashes, ulcers, excoriations, bruising  Extremities:  Decreased ROM, peripheral edema, mottling      OBJECTIVE:    BP (!) 158/68   Pulse 83   Temp 97.3 °F (36.3 °C) (Oral)   Resp 20   Ht 5' 3\" (1.6 m)   Wt 172 lb (78 kg)   SpO2 98%   BMI 30.47 kg/m²     General appearance:  awake, alert, and oriented to person, place, time, and purpose; appears stated age and cooperative; no apparent distress no labored breathing  HEENT:  Conjunctivae/corneas clear. Neck: Supple. No jugular venous distention. Respiratory: symmetrical; clear to auscultation bilaterally; no wheezes; no rhonchi; no rales  Cardiovascular: rhythm regular; rate controlled; no murmurs  Abdomen: Soft, nontender, nondistended  Extremities:  peripheral pulses present; no peripheral edema; no ulcers  Musculoskeletal: No clubbing, cyanosis, no bilateral lower extremity edema. Brisk capillary refill.    Skin:  No rashes  on visible skin  Neurologic: awake, alert and following commands     Medications:  REVIEWED DAILY    Infusion Medications   Scheduled Medications    heparin (porcine)  5,000 Units SubCUTAneous 3 times per day    metoclopramide  10 mg IntraVENous Q6H    carvedilol  25 mg Oral BID WC    senna  2 tablet Oral BID    amLODIPine  10 mg Oral Daily    losartan  100 mg Oral Daily    sucralfate  1 g Oral 4 times per day    famotidine  10 mg Oral BID    pantoprazole (PROTONIX) 40 mg injection  40 mg IntraVENous Q12H    [START ON 10/24/2022] ceFAZolin  2,000 mg IntraVENous On Call to OR     PRN Meds: prochlorperazine, acetaminophen, aluminum & magnesium hydroxide-simethicone, perflutren lipid microspheres    Labs:     Recent Labs     10/21/22  0457   WBC 6.1   HGB 13.1   HCT 39.1            Recent Labs     10/21/22  0457 10/22/22  0653    140   K 4.1 4.0    106   CO2 24 23   BUN 22 17   CREATININE 1.5* 1.5*   CALCIUM 8.8 8.9   PHOS 3.1 3.2         No results for input(s): PROT, ALB, ALKPHOS, ALT, AST, BILITOT, AMYLASE, LIPASE in the last 72 hours. No results for input(s): INR in the last 72 hours. No results for input(s): [place] in the last 72 hours. Chronic labs:    Lab Results   Component Value Date    TSH 1.490 10/06/2022    INR 1.1 09/30/2022       Radiology: REVIEWED DAILY      ASSESSMENT and PLAN:    Chest pain  ACS ruled out  Troponin and EKG repeated 10/23 due to continued complaints of pain and stable from admission  Likely 2/2 hiatal hernia and severe GERD    Hiatal hernia  S/p prior open repair in the 1970s per notes  On reglan Q6 hours   Plan for repair 10/24  Tolerating clear liquid diet    HTN  Hypertensive urgency on admission with /92 and mildly elevated high sensitivity troponin  Continue losartan, amlodipine  Started on carvedilol 10/21, increased to 25 mg on 10/22. BP slightly improved after one dose last evening- will continue to monitor with present regimen      CKD stage III  Baseline creatinine 1.3-1.5 mg/dL  Creatinine stable at baseline  No consistent proteinuria noted on UA  Recommend aggressive BP management and follow up outpatient    HFpEF 55% with stage II DD  Moderate MS, mild TR and mild pulmonary HTN    PVCs  Asymptomatic    Obesity  Body mass index is 30.47 kg/m².            DISPOSITION: Plan for OR 10/24  +++++++++++++++++++++++++++++++++++++++++++++++++  Justin [address] [place], [place]  +++++++++++++++++++++++++++++++++++++++++++++++++  NOTE: This report was transcribed using voice recognition software. Every effort was made to ensure accuracy; however, inadvertent computerized transcription errors may be present.

## 2022-10-23 NOTE — PLAN OF CARE
Problem: Pain  Goal: Verbalizes/displays adequate comfort level or baseline comfort level  10/23/2022 0845 by Chitra Brock RN  Outcome: Progressing  10/23/2022 0131 by Cassie Marks RN  Outcome: Progressing     Problem: Chronic Conditions and Co-morbidities  Goal: Patient's chronic conditions and co-morbidity symptoms are monitored and maintained or improved  10/23/2022 0845 by Chitra Brock RN  Outcome: Not Progressing     Problem: Safety - Adult  Goal: Free from fall injury  10/23/2022 0131 by Cassie Marks RN  Outcome: Progressing     Problem: Chronic Conditions and Co-morbidities  Goal: Patient's chronic conditions and co-morbidity symptoms are monitored and maintained or improved  10/23/2022 0845 by Chitra Brock RN  Outcome: Not Progressing  10/23/2022 0131 by Cassie Marks RN  Outcome: Progressing

## 2022-10-23 NOTE — PROGRESS NOTES
GENERAL SURGERY  DAILY PROGRESS NOTE  10/23/2022    Subjective:  Patient resting comfortably this AM.  Patient denies any nausea/vomiting overnight. Patient denies any questions regarding procedure planned for 10/24. Objective:  BP (!) 167/87   Pulse 88   Temp 98 °F (36.7 °C) (Oral)   Resp 20   Ht 5' 3\" (1.6 m)   Wt 172 lb (78 kg)   SpO2 97%   BMI 30.47 kg/m²     General Appearance:  awake, alert, oriented, in no acute distress  Skin:  Skin color, texture, turgor normal  Head/face:  NCAT  Eyes:  No gross abnormalities. Sclera nonicteric  Lungs/Chest:  Normal expansion. No respiratory distress. On room air  Heart: Warm throughout. Regular rate   Abdomen:  Soft, minimal tenderness to palpation in LUQ, non distended. Well healed surgical scar  Extremities: Extremities warm to touch, pink    Assessment/Plan:  66 y.o. male hiatal hernia, severe GERD. Prior open hiatal hernia repair in the 70's. Asymptomatic PVC's    - PPI BID  -Increase Reglan dose to 10  - per cardiologist, patient is acceptable risk for surgery  - Do not advance diet, continue clear liquids  - will attempt robotic assisted hiatal hernia repair 10/24, if dissection is unsafe due to surgical history, will perform gastropexy with gastrostomy tube insertion instead  -N.p.o. at midnight for procedure.  -Patient has any questions regarding procedure this AM.    Electronically signed by Sherrell Mckeon DO on 10/23/2022 at 7:49 AM     Patient seen and examined  Discussed surgery tomorrow and he is agreeable  OR tomorrow    I have explained the risks, benefits, alternatives, and potential complications associated with the proposed procedure to be performed and other issues when applicable with the patient/responsible party prior to the procedure. All of their questions were answered as appropriate and to their satisfaction. They understand and agree to the procedure.      Electronically signed by Dangelo Granados DO on 10/23/2022 at 9:29 AM

## 2022-10-23 NOTE — PLAN OF CARE
Problem: Discharge Planning  Goal: Discharge to home or other facility with appropriate resources  10/23/2022 0131 by Naomi Guaman RN  Outcome: Progressing  10/22/2022 1513 by Andrew Whitten RN  Outcome: Progressing     Problem: Pain  Goal: Verbalizes/displays adequate comfort level or baseline comfort level  10/23/2022 0131 by Naomi Guaman RN  Outcome: Progressing  10/22/2022 1513 by Andrew Whitten RN  Outcome: Progressing     Problem: Chronic Conditions and Co-morbidities  Goal: Patient's chronic conditions and co-morbidity symptoms are monitored and maintained or improved  10/23/2022 0131 by Naomi Guaman RN  Outcome: Progressing  10/22/2022 1513 by Andrew Whitten RN  Outcome: Progressing     Problem: Safety - Adult  Goal: Free from fall injury  10/23/2022 0131 by Naomi Guaman RN  Outcome: Progressing  10/22/2022 1513 by Andrew Whitten RN  Outcome: Progressing  Flowsheets (Taken 10/22/2022 0734)  Free From Fall Injury: Instruct family/caregiver on patient safety     Problem: Skin/Tissue Integrity  Goal: Absence of new skin breakdown  Description: 1. Monitor for areas of redness and/or skin breakdown  2. Assess vascular access sites hourly  3. Every 4-6 hours minimum:  Change oxygen saturation probe site  4. Every 4-6 hours:  If on nasal continuous positive airway pressure, respiratory therapy assess nares and determine need for appliance change or resting period.   10/23/2022 0131 by Naomi Guaman RN  Outcome: Progressing  10/22/2022 1513 by Andrew Whitten RN  Outcome: Progressing

## 2022-10-24 ENCOUNTER — ANESTHESIA (OUTPATIENT)
Dept: OPERATING ROOM | Age: 72
DRG: 220 | End: 2022-10-24
Payer: MEDICAID

## 2022-10-24 PROBLEM — E44.0 MODERATE PROTEIN-CALORIE MALNUTRITION (HCC): Status: ACTIVE | Noted: 2022-10-24

## 2022-10-24 LAB
ABO/RH: NORMAL
ANION GAP SERPL CALCULATED.3IONS-SCNC: 14 MMOL/L (ref 7–16)
ANTIBODY SCREEN: NORMAL
BUN BLDV-MCNC: 23 MG/DL (ref 6–23)
CALCIUM SERPL-MCNC: 9.4 MG/DL (ref 8.6–10.2)
CHLORIDE BLD-SCNC: 106 MMOL/L (ref 98–107)
CO2: 18 MMOL/L (ref 22–29)
CREAT SERPL-MCNC: 1.6 MG/DL (ref 0.7–1.2)
GFR SERPL CREATININE-BSD FRML MDRD: 45 ML/MIN/1.73
GLUCOSE BLD-MCNC: 101 MG/DL (ref 74–99)
HCT VFR BLD CALC: 42 % (ref 37–54)
HEMOGLOBIN: 14.1 G/DL (ref 12.5–16.5)
MCH RBC QN AUTO: 31.5 PG (ref 26–35)
MCHC RBC AUTO-ENTMCNC: 33.6 % (ref 32–34.5)
MCV RBC AUTO: 93.8 FL (ref 80–99.9)
PDW BLD-RTO: 13.1 FL (ref 11.5–15)
PLATELET # BLD: 264 E9/L (ref 130–450)
PMV BLD AUTO: 10.2 FL (ref 7–12)
POTASSIUM SERPL-SCNC: 4.5 MMOL/L (ref 3.5–5)
RBC # BLD: 4.48 E12/L (ref 3.8–5.8)
SODIUM BLD-SCNC: 138 MMOL/L (ref 132–146)
WBC # BLD: 5.8 E9/L (ref 4.5–11.5)

## 2022-10-24 PROCEDURE — 6360000002 HC RX W HCPCS: Performed by: ANESTHESIOLOGY

## 2022-10-24 PROCEDURE — 86900 BLOOD TYPING SEROLOGIC ABO: CPT

## 2022-10-24 PROCEDURE — 2500000003 HC RX 250 WO HCPCS

## 2022-10-24 PROCEDURE — A4216 STERILE WATER/SALINE, 10 ML: HCPCS | Performed by: STUDENT IN AN ORGANIZED HEALTH CARE EDUCATION/TRAINING PROGRAM

## 2022-10-24 PROCEDURE — 43281 LAP PARAESOPHAG HERN REPAIR: CPT | Performed by: STUDENT IN AN ORGANIZED HEALTH CARE EDUCATION/TRAINING PROGRAM

## 2022-10-24 PROCEDURE — 6360000002 HC RX W HCPCS: Performed by: STUDENT IN AN ORGANIZED HEALTH CARE EDUCATION/TRAINING PROGRAM

## 2022-10-24 PROCEDURE — 3700000000 HC ANESTHESIA ATTENDED CARE: Performed by: STUDENT IN AN ORGANIZED HEALTH CARE EDUCATION/TRAINING PROGRAM

## 2022-10-24 PROCEDURE — 43832 GSTRST OPEN W/CONSTJ TUBE: CPT | Performed by: STUDENT IN AN ORGANIZED HEALTH CARE EDUCATION/TRAINING PROGRAM

## 2022-10-24 PROCEDURE — 6360000002 HC RX W HCPCS

## 2022-10-24 PROCEDURE — 80048 BASIC METABOLIC PNL TOTAL CA: CPT

## 2022-10-24 PROCEDURE — 2580000003 HC RX 258: Performed by: STUDENT IN AN ORGANIZED HEALTH CARE EDUCATION/TRAINING PROGRAM

## 2022-10-24 PROCEDURE — 8E0W4CZ ROBOTIC ASSISTED PROCEDURE OF TRUNK REGION, PERCUTANEOUS ENDOSCOPIC APPROACH: ICD-10-PCS | Performed by: STUDENT IN AN ORGANIZED HEALTH CARE EDUCATION/TRAINING PROGRAM

## 2022-10-24 PROCEDURE — 0DQ64ZZ REPAIR STOMACH, PERCUTANEOUS ENDOSCOPIC APPROACH: ICD-10-PCS | Performed by: STUDENT IN AN ORGANIZED HEALTH CARE EDUCATION/TRAINING PROGRAM

## 2022-10-24 PROCEDURE — 2709999900 HC NON-CHARGEABLE SUPPLY: Performed by: STUDENT IN AN ORGANIZED HEALTH CARE EDUCATION/TRAINING PROGRAM

## 2022-10-24 PROCEDURE — 86850 RBC ANTIBODY SCREEN: CPT

## 2022-10-24 PROCEDURE — 6370000000 HC RX 637 (ALT 250 FOR IP): Performed by: STUDENT IN AN ORGANIZED HEALTH CARE EDUCATION/TRAINING PROGRAM

## 2022-10-24 PROCEDURE — 0BQT4ZZ REPAIR DIAPHRAGM, PERCUTANEOUS ENDOSCOPIC APPROACH: ICD-10-PCS | Performed by: STUDENT IN AN ORGANIZED HEALTH CARE EDUCATION/TRAINING PROGRAM

## 2022-10-24 PROCEDURE — 3600000015 HC SURGERY LEVEL 5 ADDTL 15MIN: Performed by: STUDENT IN AN ORGANIZED HEALTH CARE EDUCATION/TRAINING PROGRAM

## 2022-10-24 PROCEDURE — 2140000000 HC CCU INTERMEDIATE R&B

## 2022-10-24 PROCEDURE — 85027 COMPLETE CBC AUTOMATED: CPT

## 2022-10-24 PROCEDURE — C9113 INJ PANTOPRAZOLE SODIUM, VIA: HCPCS | Performed by: STUDENT IN AN ORGANIZED HEALTH CARE EDUCATION/TRAINING PROGRAM

## 2022-10-24 PROCEDURE — 6360000002 HC RX W HCPCS: Performed by: SURGERY

## 2022-10-24 PROCEDURE — 0DNW4ZZ RELEASE PERITONEUM, PERCUTANEOUS ENDOSCOPIC APPROACH: ICD-10-PCS | Performed by: STUDENT IN AN ORGANIZED HEALTH CARE EDUCATION/TRAINING PROGRAM

## 2022-10-24 PROCEDURE — 2500000003 HC RX 250 WO HCPCS: Performed by: STUDENT IN AN ORGANIZED HEALTH CARE EDUCATION/TRAINING PROGRAM

## 2022-10-24 PROCEDURE — 6370000000 HC RX 637 (ALT 250 FOR IP): Performed by: NURSE PRACTITIONER

## 2022-10-24 PROCEDURE — 86901 BLOOD TYPING SEROLOGIC RH(D): CPT

## 2022-10-24 PROCEDURE — 58660 LAPAROSCOPY LYSIS: CPT | Performed by: STUDENT IN AN ORGANIZED HEALTH CARE EDUCATION/TRAINING PROGRAM

## 2022-10-24 PROCEDURE — 6370000000 HC RX 637 (ALT 250 FOR IP): Performed by: INTERNAL MEDICINE

## 2022-10-24 PROCEDURE — 7100000000 HC PACU RECOVERY - FIRST 15 MIN: Performed by: STUDENT IN AN ORGANIZED HEALTH CARE EDUCATION/TRAINING PROGRAM

## 2022-10-24 PROCEDURE — 7100000001 HC PACU RECOVERY - ADDTL 15 MIN: Performed by: STUDENT IN AN ORGANIZED HEALTH CARE EDUCATION/TRAINING PROGRAM

## 2022-10-24 PROCEDURE — 2580000003 HC RX 258: Performed by: NURSE PRACTITIONER

## 2022-10-24 PROCEDURE — 2580000003 HC RX 258

## 2022-10-24 PROCEDURE — P9047 ALBUMIN (HUMAN), 25%, 50ML: HCPCS

## 2022-10-24 PROCEDURE — 2720000010 HC SURG SUPPLY STERILE: Performed by: STUDENT IN AN ORGANIZED HEALTH CARE EDUCATION/TRAINING PROGRAM

## 2022-10-24 PROCEDURE — 36415 COLL VENOUS BLD VENIPUNCTURE: CPT

## 2022-10-24 PROCEDURE — 3700000001 HC ADD 15 MINUTES (ANESTHESIA): Performed by: STUDENT IN AN ORGANIZED HEALTH CARE EDUCATION/TRAINING PROGRAM

## 2022-10-24 PROCEDURE — 3600000005 HC SURGERY LEVEL 5 BASE: Performed by: STUDENT IN AN ORGANIZED HEALTH CARE EDUCATION/TRAINING PROGRAM

## 2022-10-24 RX ORDER — OXYCODONE HYDROCHLORIDE 5 MG/1
5 TABLET ORAL EVERY 4 HOURS PRN
Status: DISCONTINUED | OUTPATIENT
Start: 2022-10-24 | End: 2022-11-01 | Stop reason: HOSPADM

## 2022-10-24 RX ORDER — LIDOCAINE HYDROCHLORIDE AND EPINEPHRINE 10; 10 MG/ML; UG/ML
INJECTION, SOLUTION INFILTRATION; PERINEURAL PRN
Status: DISCONTINUED | OUTPATIENT
Start: 2022-10-24 | End: 2022-10-24 | Stop reason: ALTCHOICE

## 2022-10-24 RX ORDER — SODIUM CHLORIDE, SODIUM LACTATE, POTASSIUM CHLORIDE, CALCIUM CHLORIDE 600; 310; 30; 20 MG/100ML; MG/100ML; MG/100ML; MG/100ML
INJECTION, SOLUTION INTRAVENOUS CONTINUOUS PRN
Status: DISCONTINUED | OUTPATIENT
Start: 2022-10-24 | End: 2022-10-24 | Stop reason: SDUPTHER

## 2022-10-24 RX ORDER — NEOSTIGMINE METHYLSULFATE 1 MG/ML
INJECTION, SOLUTION INTRAVENOUS PRN
Status: DISCONTINUED | OUTPATIENT
Start: 2022-10-24 | End: 2022-10-24 | Stop reason: SDUPTHER

## 2022-10-24 RX ORDER — SODIUM CHLORIDE 0.9 % (FLUSH) 0.9 %
5-40 SYRINGE (ML) INJECTION EVERY 12 HOURS SCHEDULED
Status: DISCONTINUED | OUTPATIENT
Start: 2022-10-24 | End: 2022-10-24 | Stop reason: HOSPADM

## 2022-10-24 RX ORDER — SODIUM CHLORIDE 9 MG/ML
INJECTION, SOLUTION INTRAVENOUS CONTINUOUS PRN
Status: DISCONTINUED | OUTPATIENT
Start: 2022-10-24 | End: 2022-10-24 | Stop reason: SDUPTHER

## 2022-10-24 RX ORDER — ROCURONIUM BROMIDE 10 MG/ML
INJECTION, SOLUTION INTRAVENOUS PRN
Status: DISCONTINUED | OUTPATIENT
Start: 2022-10-24 | End: 2022-10-24 | Stop reason: SDUPTHER

## 2022-10-24 RX ORDER — ALBUMIN (HUMAN) 12.5 G/50ML
SOLUTION INTRAVENOUS PRN
Status: DISCONTINUED | OUTPATIENT
Start: 2022-10-24 | End: 2022-10-24 | Stop reason: SDUPTHER

## 2022-10-24 RX ORDER — SODIUM CHLORIDE 9 MG/ML
INJECTION, SOLUTION INTRAVENOUS CONTINUOUS
Status: ACTIVE | OUTPATIENT
Start: 2022-10-24 | End: 2022-10-24

## 2022-10-24 RX ORDER — ONDANSETRON 2 MG/ML
INJECTION INTRAMUSCULAR; INTRAVENOUS PRN
Status: DISCONTINUED | OUTPATIENT
Start: 2022-10-24 | End: 2022-10-24 | Stop reason: SDUPTHER

## 2022-10-24 RX ORDER — LIDOCAINE HYDROCHLORIDE 20 MG/ML
INJECTION, SOLUTION INTRAVENOUS PRN
Status: DISCONTINUED | OUTPATIENT
Start: 2022-10-24 | End: 2022-10-24 | Stop reason: SDUPTHER

## 2022-10-24 RX ORDER — PHENYLEPHRINE HCL IN 0.9% NACL 1 MG/10 ML
SYRINGE (ML) INTRAVENOUS PRN
Status: DISCONTINUED | OUTPATIENT
Start: 2022-10-24 | End: 2022-10-24 | Stop reason: SDUPTHER

## 2022-10-24 RX ORDER — SODIUM CHLORIDE 0.9 % (FLUSH) 0.9 %
5-40 SYRINGE (ML) INJECTION PRN
Status: DISCONTINUED | OUTPATIENT
Start: 2022-10-24 | End: 2022-10-24 | Stop reason: HOSPADM

## 2022-10-24 RX ORDER — VASOPRESSIN 20 U/ML
INJECTION PARENTERAL PRN
Status: DISCONTINUED | OUTPATIENT
Start: 2022-10-24 | End: 2022-10-24 | Stop reason: SDUPTHER

## 2022-10-24 RX ORDER — ONDANSETRON 2 MG/ML
4 INJECTION INTRAMUSCULAR; INTRAVENOUS
Status: DISCONTINUED | OUTPATIENT
Start: 2022-10-24 | End: 2022-10-24 | Stop reason: HOSPADM

## 2022-10-24 RX ORDER — DEXAMETHASONE SODIUM PHOSPHATE 10 MG/ML
INJECTION INTRAMUSCULAR; INTRAVENOUS PRN
Status: DISCONTINUED | OUTPATIENT
Start: 2022-10-24 | End: 2022-10-24 | Stop reason: SDUPTHER

## 2022-10-24 RX ORDER — GLYCOPYRROLATE 0.2 MG/ML
INJECTION INTRAMUSCULAR; INTRAVENOUS PRN
Status: DISCONTINUED | OUTPATIENT
Start: 2022-10-24 | End: 2022-10-24 | Stop reason: SDUPTHER

## 2022-10-24 RX ORDER — FENTANYL CITRATE 50 UG/ML
INJECTION, SOLUTION INTRAMUSCULAR; INTRAVENOUS PRN
Status: DISCONTINUED | OUTPATIENT
Start: 2022-10-24 | End: 2022-10-24 | Stop reason: SDUPTHER

## 2022-10-24 RX ORDER — FUROSEMIDE 10 MG/ML
INJECTION INTRAMUSCULAR; INTRAVENOUS PRN
Status: DISCONTINUED | OUTPATIENT
Start: 2022-10-24 | End: 2022-10-24 | Stop reason: SDUPTHER

## 2022-10-24 RX ORDER — SODIUM CHLORIDE 9 MG/ML
INJECTION, SOLUTION INTRAVENOUS PRN
Status: DISCONTINUED | OUTPATIENT
Start: 2022-10-24 | End: 2022-10-24 | Stop reason: HOSPADM

## 2022-10-24 RX ORDER — PROPOFOL 10 MG/ML
INJECTION, EMULSION INTRAVENOUS PRN
Status: DISCONTINUED | OUTPATIENT
Start: 2022-10-24 | End: 2022-10-24 | Stop reason: SDUPTHER

## 2022-10-24 RX ADMIN — PROPOFOL 120 MG: 10 INJECTION, EMULSION INTRAVENOUS at 12:11

## 2022-10-24 RX ADMIN — VASOPRESSIN 1 UNITS: 20 INJECTION INTRAVENOUS at 13:28

## 2022-10-24 RX ADMIN — Medication 100 MCG: at 13:02

## 2022-10-24 RX ADMIN — METOCLOPRAMIDE 10 MG: 5 INJECTION, SOLUTION INTRAMUSCULAR; INTRAVENOUS at 03:07

## 2022-10-24 RX ADMIN — METOCLOPRAMIDE 10 MG: 5 INJECTION, SOLUTION INTRAMUSCULAR; INTRAVENOUS at 21:19

## 2022-10-24 RX ADMIN — SODIUM CHLORIDE, POTASSIUM CHLORIDE, SODIUM LACTATE AND CALCIUM CHLORIDE: 600; 310; 30; 20 INJECTION, SOLUTION INTRAVENOUS at 13:13

## 2022-10-24 RX ADMIN — CARVEDILOL 25 MG: 25 TABLET, FILM COATED ORAL at 09:23

## 2022-10-24 RX ADMIN — VASOPRESSIN 2 UNITS: 20 INJECTION INTRAVENOUS at 13:57

## 2022-10-24 RX ADMIN — Medication 200 MCG: at 13:17

## 2022-10-24 RX ADMIN — Medication 100 MCG: at 13:06

## 2022-10-24 RX ADMIN — SENNOSIDES 17.2 MG: 8.6 TABLET, FILM COATED ORAL at 21:18

## 2022-10-24 RX ADMIN — Medication 3 MG: at 15:58

## 2022-10-24 RX ADMIN — VASOPRESSIN 1 UNITS: 20 INJECTION INTRAVENOUS at 14:51

## 2022-10-24 RX ADMIN — LIDOCAINE HYDROCHLORIDE 100 MG: 20 INJECTION, SOLUTION INTRAVENOUS at 13:05

## 2022-10-24 RX ADMIN — FENTANYL CITRATE 25 MCG: 50 INJECTION, SOLUTION INTRAMUSCULAR; INTRAVENOUS at 16:02

## 2022-10-24 RX ADMIN — ROCURONIUM BROMIDE 20 MG: 10 INJECTION, SOLUTION INTRAVENOUS at 13:24

## 2022-10-24 RX ADMIN — FAMOTIDINE 10 MG: 20 TABLET ORAL at 21:19

## 2022-10-24 RX ADMIN — AMLODIPINE BESYLATE 10 MG: 10 TABLET ORAL at 09:23

## 2022-10-24 RX ADMIN — LIDOCAINE HYDROCHLORIDE 100 MG: 20 INJECTION, SOLUTION INTRAVENOUS at 13:46

## 2022-10-24 RX ADMIN — Medication 200 MCG: at 12:20

## 2022-10-24 RX ADMIN — Medication 100 MCG: at 13:19

## 2022-10-24 RX ADMIN — Medication 100 MCG: at 13:09

## 2022-10-24 RX ADMIN — HYDROMORPHONE HYDROCHLORIDE 0.25 MG: 1 INJECTION, SOLUTION INTRAMUSCULAR; INTRAVENOUS; SUBCUTANEOUS at 17:09

## 2022-10-24 RX ADMIN — ONDANSETRON 4 MG: 2 INJECTION INTRAMUSCULAR; INTRAVENOUS at 15:32

## 2022-10-24 RX ADMIN — SODIUM CHLORIDE: 9 INJECTION, SOLUTION INTRAVENOUS at 09:57

## 2022-10-24 RX ADMIN — SODIUM CHLORIDE: 9 INJECTION, SOLUTION INTRAVENOUS at 13:55

## 2022-10-24 RX ADMIN — SUCRALFATE 1 G: 1 TABLET ORAL at 05:57

## 2022-10-24 RX ADMIN — VASOPRESSIN 1 UNITS: 20 INJECTION INTRAVENOUS at 13:23

## 2022-10-24 RX ADMIN — ALBUMIN (HUMAN) 25 G: 12.5 SOLUTION INTRAVENOUS at 13:34

## 2022-10-24 RX ADMIN — LIDOCAINE HYDROCHLORIDE 80 MG: 20 INJECTION, SOLUTION INTRAVENOUS at 12:11

## 2022-10-24 RX ADMIN — Medication 100 MCG: at 12:16

## 2022-10-24 RX ADMIN — SENNOSIDES 17.2 MG: 8.6 TABLET, FILM COATED ORAL at 09:24

## 2022-10-24 RX ADMIN — LOSARTAN POTASSIUM 100 MG: 50 TABLET, FILM COATED ORAL at 09:24

## 2022-10-24 RX ADMIN — HEPARIN SODIUM 5000 UNITS: 10000 INJECTION INTRAVENOUS; SUBCUTANEOUS at 21:35

## 2022-10-24 RX ADMIN — FAMOTIDINE 10 MG: 20 TABLET ORAL at 09:23

## 2022-10-24 RX ADMIN — SODIUM CHLORIDE, POTASSIUM CHLORIDE, SODIUM LACTATE AND CALCIUM CHLORIDE: 600; 310; 30; 20 INJECTION, SOLUTION INTRAVENOUS at 11:58

## 2022-10-24 RX ADMIN — Medication 100 MCG: at 12:19

## 2022-10-24 RX ADMIN — FUROSEMIDE 20 MG: 10 INJECTION, SOLUTION INTRAMUSCULAR; INTRAVENOUS at 14:50

## 2022-10-24 RX ADMIN — VASOPRESSIN 1 UNITS: 20 INJECTION INTRAVENOUS at 13:25

## 2022-10-24 RX ADMIN — FENTANYL CITRATE 100 MCG: 50 INJECTION, SOLUTION INTRAMUSCULAR; INTRAVENOUS at 12:11

## 2022-10-24 RX ADMIN — SUCRALFATE 1 G: 1 TABLET ORAL at 01:08

## 2022-10-24 RX ADMIN — ROCURONIUM BROMIDE 50 MG: 10 INJECTION, SOLUTION INTRAVENOUS at 12:11

## 2022-10-24 RX ADMIN — DEXAMETHASONE SODIUM PHOSPHATE 10 MG: 10 INJECTION INTRAMUSCULAR; INTRAVENOUS at 12:35

## 2022-10-24 RX ADMIN — METOCLOPRAMIDE 10 MG: 5 INJECTION, SOLUTION INTRAMUSCULAR; INTRAVENOUS at 09:24

## 2022-10-24 RX ADMIN — SODIUM CHLORIDE, PRESERVATIVE FREE 40 MG: 5 INJECTION INTRAVENOUS at 01:08

## 2022-10-24 RX ADMIN — GLYCOPYRROLATE 0.6 MG: 0.2 INJECTION INTRAMUSCULAR; INTRAVENOUS at 15:58

## 2022-10-24 RX ADMIN — Medication 100 MCG: at 13:41

## 2022-10-24 RX ADMIN — HEPARIN SODIUM 5000 UNITS: 10000 INJECTION INTRAVENOUS; SUBCUTANEOUS at 06:47

## 2022-10-24 RX ADMIN — CEFAZOLIN 2000 MG: 2 INJECTION, POWDER, FOR SOLUTION INTRAMUSCULAR; INTRAVENOUS at 12:20

## 2022-10-24 ASSESSMENT — PAIN SCALES - GENERAL
PAINLEVEL_OUTOF10: 5
PAINLEVEL_OUTOF10: 0
PAINLEVEL_OUTOF10: 0
PAINLEVEL_OUTOF10: 5
PAINLEVEL_OUTOF10: 0

## 2022-10-24 ASSESSMENT — PAIN DESCRIPTION - DESCRIPTORS
DESCRIPTORS: DISCOMFORT
DESCRIPTORS: DISCOMFORT

## 2022-10-24 ASSESSMENT — PAIN DESCRIPTION - LOCATION
LOCATION: ABDOMEN
LOCATION: ABDOMEN

## 2022-10-24 ASSESSMENT — ENCOUNTER SYMPTOMS
SHORTNESS OF BREATH: 1
DYSPNEA ACTIVITY LEVEL: AFTER AMBULATING 1 FLIGHT OF STAIRS

## 2022-10-24 ASSESSMENT — PAIN DESCRIPTION - PAIN TYPE
TYPE: SURGICAL PAIN
TYPE: SURGICAL PAIN

## 2022-10-24 ASSESSMENT — PAIN DESCRIPTION - FREQUENCY
FREQUENCY: INTERMITTENT
FREQUENCY: INTERMITTENT

## 2022-10-24 ASSESSMENT — LIFESTYLE VARIABLES: SMOKING_STATUS: 0

## 2022-10-24 NOTE — ANESTHESIA POSTPROCEDURE EVALUATION
Department of Anesthesiology  Postprocedure Note    Patient: Valarie Bennett  MRN: [id number]  YOB: 1950  Date of evaluation: 10/24/2022      Procedure Summary     Date: 10/24/22 Room / Location: [facility] OR 05 / [facility]    Anesthesia Start: 2731 Anesthesia Stop: 1624    Procedures:       ROBOTIC HIATAL HERNIA REPAIR, PLACEMENT OF G-TUBE (Abdomen)      EGD DIAGNOSTIC ONLY (Abdomen) Diagnosis:       Hiatal hernia      (/)    Surgeons: Derrell Katz DO Responsible Provider: Anni Viramontes MD    Anesthesia Type: general ASA Status: 3          Anesthesia Type: No value filed.     Ino Phase I: Ino Score: 8    Ino Phase II:        Anesthesia Post Evaluation    Patient location during evaluation: PACU  Patient participation: complete - patient participated  Level of consciousness: awake and alert  Pain score: 1  Airway patency: patent  Nausea & Vomiting: no nausea  Complications: no  Cardiovascular status: blood pressure returned to baseline and hemodynamically stable  Respiratory status: acceptable  Hydration status: euvolemic  Multimodal analgesia pain management approach

## 2022-10-24 NOTE — PROGRESS NOTES
Hospitalist Progress Note      Synopsis: Patient admitted for chest pain. Patient was recently hospitalized discharged on . Patient presented with chest pain. CT chest showed large hiatal hernia. Upper GI series showed diffuse distention of the esophagus, severe esophageal dysmotility moderate sized hiatal hernia moderate to severe gastroesophageal reflux however examination was severely limited. EGD on  distal LA grade C esophagitis irregular Z-line at 30 cm large hiatal hernia containing fluid with gastric pinch at 40 cm fibrous food debris in the fundus normal stomach and normal duodenum. Patient presented to emergency room this time with nausea vomiting chest pain sore throat over the past few days prior to her presentation. He had vomiting since he left the hospital.  Patient had epigastric pain and chest pain described as acid reflux. Symptoms were aggravated by oral intake. No fever chills shortness of breath cough or diarrhea. Patient had nuclear stress test on  which was negative. [facility] a [facility]. General surgery was consulted and there is plan for hiatal hernia repair today. Cardiology was consulted for cardiac clearance. Hospital day 2     Subjective:  Stable overnight. No issues reported. Patient was in OR on my attempts to examine. Records reviewed.        Temp (24hrs), Av.2 °F (36.8 °C), Min:98.1 °F (36.7 °C), Max:98.4 °F (36.9 °C)    DIET: Diet NPO  CODE: Prior    Intake/Output Summary (Last 24 hours) at 10/24/2022 0914  Last data filed at 10/23/2022 1503  Gross per 24 hour   Intake 120 ml   Output 50 ml   Net 70 ml       Objective:    BP (!) 150/62   Pulse 80   Temp 98.1 °F (36.7 °C) (Oral)   Resp 20   Ht 5' 3\" (1.6 m)   Wt 172 lb (78 kg)   SpO2 (!) 88%   BMI 30.47 kg/m²       Medications:  REVIEWED DAILY    Infusion Medications   Scheduled Medications    heparin (porcine)  5,000 Units SubCUTAneous 3 times per day    metoclopramide  10 mg IntraVENous Q6H    carvedilol  25 mg Oral BID WC    senna  2 tablet Oral BID    amLODIPine  10 mg Oral Daily    losartan  100 mg Oral Daily    sucralfate  1 g Oral 4 times per day    famotidine  10 mg Oral BID    pantoprazole (PROTONIX) 40 mg injection  40 mg IntraVENous Q12H    ceFAZolin  2,000 mg IntraVENous On Call to OR     PRN Meds: prochlorperazine, acetaminophen, aluminum & magnesium hydroxide-simethicone, perflutren lipid microspheres    Labs:     Recent Labs     10/24/22  0443   WBC 5.8   HGB 14.1   HCT 42.0          Recent Labs     10/22/22  0653 10/24/22  0443    138   K 4.0 4.5    106   CO2 23 18*   BUN 17 23   CREATININE 1.5* 1.6*   CALCIUM 8.9 9.4   PHOS 3.2  --        No results for input(s): PROT, ALB, ALKPHOS, ALT, AST, BILITOT, AMYLASE, LIPASE in the last 72 hours. No results for input(s): INR in the last 72 hours. No results for input(s): Lattie Nichole in the last 72 hours. Chronic labs:    Lab Results   Component Value Date    TSH 1.490 10/06/2022    INR 1.1 09/30/2022       Radiology: REVIEWED DAILY    Assessment:  Chest pain  Large hiatal hernia  Severe GERD  Hypertension  CKD stage III, baseline creatinine 1.3-1.5  HFpEF-EF 55%  VHD-moderate MS, mild TR, mild pulmonary hypertension  Obesity, Body mass index is 30.47 kg/m².     Plan:  GS and cardiology following  For hernia repair today  PPI BID, carafate  Reglan   Gentle IVF  Monitor renal fxn    DVT Prophylaxis [] Lovenox  [x]  Heparin [] DOAC [] PCDs [] Ambulation    GI Prophylaxis [x] PPI  [] H2 Blocker   [] Carafate  [] Diet/Tube Feeds   Level of care [x] Med/Surg  [] Intermediate  []  ICU   Diet Diet NPO    Family contact []  N/A    [] At bedside  [] Phone call     Discharge Plan: Back to residential house once cleared by GS s/p hernia repair     +++++++++++++++++++++++++++++++++++++++++++++++++  Skyler Swanson [id number] [address]  +++++++++++++++++++++++++++++++++++++++++++++++++  NOTE: This report was transcribed using voice recognition software. Every effort was made to ensure accuracy; however, inadvertent computerized transcription errors may be present.

## 2022-10-24 NOTE — PROGRESS NOTES
GENERAL SURGERY  DAILY PROGRESS NOTE  10/24/2022    Subjective:  No acute events overnight. Patient resting comfortable this AM.  Patient denies any questions regarding procedure this AM.    Objective:  BP (!) 150/83   Pulse 85   Temp 98.2 °F (36.8 °C) (Oral)   Resp 20   Ht 5' 3\" (1.6 m)   Wt 172 lb (78 kg)   SpO2 97%   BMI 30.47 kg/m²     General Appearance:  awake, alert, oriented, in no acute distress  Skin:  Skin color, texture, turgor normal  Head/face:  NCAT  Eyes:  No gross abnormalities. Sclera nonicteric  Lungs/Chest:  Normal expansion. No respiratory distress. On room air  Heart: Warm throughout. Regular rate   Abdomen:  Soft, minimal tenderness to palpation in LUQ, non distended. Well healed surgical scar  Extremities: Extremities warm to touch, pink    Assessment/Plan:  66 y.o. male hiatal hernia, severe GERD. Prior open hiatal hernia repair in the 70's.  Asymptomatic PVC's    - PPI BID  -Increase Reglan dose to 10  - per cardiologist, patient is acceptable risk for surgery  - Do not advance diet, continue clear liquids  -Plan for robotic assisted hiatal hernia repair patient resting up this AM., if dissection is unsafe due to surgical history, will perform gastropexy with gastrostomy tube insertion instead  -N.p.o. for procedure    Electronically signed by Magui Salguero DO on 10/24/2022 at 6:42 AM

## 2022-10-24 NOTE — BRIEF OP NOTE
Brief Postoperative Note      Patient: Emily Edwards  YOB: 1950  MRN: [id number]    Date of Procedure: 10/24/2022    Pre-Op Diagnosis: recurrent incarcerated paraesophageal hernia     Post-Op Diagnosis: Same       Procedure(s):  ROBOTIC HIATAL HERNIA REPAIR, PLACEMENT OF G-TUBE  EGD DIAGNOSTIC ONLY, gastropexy, extensive lysis of adhesions     Surgeon(s):  Darcy Yarbrough DO    Assistant:  Resident: Shayne Gomez MD    Anesthesia: General    Estimated Blood Loss (mL): Minimal    Complications: None    Specimens:   * No specimens in log *    Implants:  * No implants in log *      Drains:   Gastrostomy/Enterostomy/Jejunostomy Tube Gastrostomy LUQ 1 22 fr (Active)       Urinary Catheter 10/24/22 To (Active)       Findings: large paraesophageal hernia incarcerated  Dictated [id number]    Electronically signed by Darcy Yarbrough DO on 10/24/2022 at 4:02 PM

## 2022-10-24 NOTE — ANESTHESIA PRE PROCEDURE
Department of Anesthesiology  Preprocedure Note       Name:  Valentina Guerra   Age:  66 y.o.  :  1950                                          MRN:  [id number]         Date:  10/24/2022      Surgeon: Tracie Carlisle):  Norman Martin DO    Procedure: Procedure(s): HERNIA HIATAL LAPAROSCOPIC ROBOTIC XI POSSIBLE GASTROPEXY, POSSIBLE G-TUBE    Medications prior to admission:   Prior to Admission medications    Medication Sig Start Date End Date Taking? Authorizing Provider   docusate sodium (COLACE) 100 MG capsule Take 100 mg by mouth daily    Historical Provider, MD   loperamide (IMODIUM) 2 MG capsule Take 2 mg by mouth 4 times daily as needed for Diarrhea    Historical Provider, MD   losartan (COZAAR) 25 MG tablet Take 1 tablet by mouth daily 10/15/22   Angel De La Rosa MD   pantoprazole (PROTONIX) 40 MG tablet Take 1 tablet by mouth 2 times daily (before meals) 10/14/22   Angel De La Rosa MD   sucralfate (CARAFATE) 1 GM tablet Take 1 tablet by mouth 4 times daily 10/14/22   Angel De La Rosa MD       Current medications:    No current facility-administered medications for this visit. No current outpatient medications on file.      Facility-Administered Medications Ordered in Other Visits   Medication Dose Route Frequency Provider Last Rate Last Admin    heparin (porcine) injection 5,000 Units  5,000 Units SubCUTAneous 3 times per day Marin Do, DO   5,000 Units at 10/24/22 0647    metoclopramide (REGLAN) injection 10 mg  10 mg IntraVENous Q6H Jodygeorgina Lambert, DO   10 mg at 10/24/22 0307    carvedilol (COREG) tablet 25 mg  25 mg Oral BID  Frankey Sequin, APRN - CNP   25 mg at 10/23/22 1754    prochlorperazine (COMPAZINE) injection 10 mg  10 mg IntraVENous Q6H PRN North Rogers MD   10 mg at 10/23/22 2000    senna (SENOKOT) tablet 17.2 mg  2 tablet Oral BID Osmar Cari, DO   17.2 mg at 10/23/22 0831    amLODIPine (NORVASC) tablet 10 mg  10 mg Oral Daily Angus Wadsworth MD   10 mg at 10/23/22 0831    acetaminophen (TYLENOL) tablet 650 mg  650 mg Oral Q4H PRN Zack Stover MD   650 mg at 10/23/22 2000    losartan (COZAAR) tablet 100 mg  100 mg Oral Daily Zack Stover MD   100 mg at 10/23/22 0831    sucralfate (CARAFATE) tablet 1 g  1 g Oral 4 times per day Zack Stover MD   1 g at 10/24/22 0557    famotidine (PEPCID) tablet 10 mg  10 mg Oral BID Zack Stover MD   10 mg at 10/23/22 1952    aluminum & magnesium hydroxide-simethicone (MAALOX) 200-200-20 MG/5ML suspension 30 mL  30 mL Oral Q6H PRN Zack Stover MD   30 mL at 10/23/22 2000    pantoprazole (PROTONIX) 40 mg in sodium chloride (PF) 10 mL injection  40 mg IntraVENous Q12H Gianmarino C Gianfrate, DO   40 mg at 10/24/22 0108    perflutren lipid microspheres (DEFINITY) injection 1.65 mg  1.5 mL IntraVENous ONCE PRN Early Neth, APRN - CNP        ceFAZolin (ANCEF) 2,000 mg in sterile water 20 mL IV syringe  2,000 mg IntraVENous On Call to Buzz Lozano [address], DO           Allergies:  No Known Allergies    Problem List:    Patient Active Problem List   Diagnosis Code    Community acquired pneumonia of both lower lobes J18.9    Paraesophageal hernia K44.9    Dysphagia R13.10    Chest pain R07.9    PVC's (premature ventricular contractions) I49.3    Primary hypertension I10    History of CVA (cerebrovascular accident) Z80.78    Nonrheumatic aortic valve stenosis I35.0    Nonrheumatic tricuspid valve regurgitation I36.1    Pulmonary HTN ([facility] [address] I27.20    Hiatal hernia with GERD K44.9, K21.9       Past Medical History:        Diagnosis Date    Arthritis     Cerebral artery occlusion with cerebral infarction ([facility] [address]     Primary hypertension 10/20/2022    Prostate disorder        Past Surgical History:        Procedure Laterality Date    ESOPHAGEAL DILATATION      UPPER GASTROINTESTINAL ENDOSCOPY N/A 10/13/2022    EGD ESOPHAGOGASTRODUODENOSCOPY performed by Alex Stone MD at [facility] History:    Social History     Tobacco Use    Smoking status: Never    Smokeless tobacco: Never   Substance Use Topics    Alcohol use: Not Currently                                Counseling given: Not Answered      Vital Signs (Current): There were no vitals filed for this visit. BP Readings from Last 3 Encounters:   10/24/22 (!) 150/62   10/14/22 (!) 157/79   09/30/22 (!) 161/83       NPO Status:                                                                                 BMI:   Wt Readings from Last 3 Encounters:   10/20/22 172 lb (78 kg)   10/06/22 172 lb (78 kg)   09/30/22 179 lb (81.2 kg)     There is no height or weight on file to calculate BMI.    CBC:   Lab Results   Component Value Date/Time    WBC 5.8 10/24/2022 04:43 AM    RBC 4.48 10/24/2022 04:43 AM    HGB 14.1 10/24/2022 04:43 AM    HCT 42.0 10/24/2022 04:43 AM    MCV 93.8 10/24/2022 04:43 AM    RDW 13.1 10/24/2022 04:43 AM     10/24/2022 04:43 AM       CMP:   Lab Results   Component Value Date/Time     10/24/2022 04:43 AM    K 4.5 10/24/2022 04:43 AM    K 3.7 10/13/2022 05:01 AM     10/24/2022 04:43 AM    CO2 18 10/24/2022 04:43 AM    BUN 23 10/24/2022 04:43 AM    CREATININE 1.6 10/24/2022 04:43 AM    GFRAA 56 10/13/2022 05:01 AM    LABGLOM 45 10/24/2022 04:43 AM    GLUCOSE 101 10/24/2022 04:43 AM    PROT 6.8 10/20/2022 03:02 AM    CALCIUM 9.4 10/24/2022 04:43 AM    BILITOT 0.4 10/20/2022 03:02 AM    ALKPHOS 47 10/20/2022 03:02 AM    AST 17 10/20/2022 03:02 AM    ALT 23 10/20/2022 03:02 AM       POC Tests: No results for input(s): POCGLU, POCNA, POCK, POCCL, POCBUN, POCHEMO, POCHCT in the last 72 hours.     Coags:   Lab Results   Component Value Date/Time    PROTIME 11.9 09/30/2022 03:20 PM    INR 1.1 09/30/2022 03:20 PM    APTT 27.4 09/30/2022 03:20 PM       HCG (If Applicable): No results found for: PREGTESTUR, PREGSERUM, HCG, HCGQUANT     ABGs: No results found for: PHART, PO2ART, CUM2IRA, MVH6JMD, BEART, O4XZYVUV     Type & Screen (If Applicable):  No results found for: LABABO, LABRH    Drug/Infectious Status (If Applicable):  No results found for: HIV, HEPCAB    COVID-19 Screening (If Applicable):   Lab Results   Component Value Date/Time    COVID19 Not Detected 10/07/2022 05:40 PM       10/6/22 CTA Pulmonary   Impression   1. No evidence of pulmonary embolism or acute pulmonary abnormality. 2. Large hiatal hernia. 3. View of the upper abdomen show severe calcified and noncalcified   atherosclerotic plaque involving the abdominal aorta. 10/11/22 FL UGI W KUB      Impression   Severely limited examination with diffuse distension of the esophagus, severe   esophageal dysmotility and a moderate-sized hiatal hernia.  Moderate to   severe gastroesophageal reflux is also present.      10/6/22 EKG  Narrative & Impression    Sinus rhythm with frequent premature ventricular complexes  Minimal voltage criteria for LVH, may be normal variant  Borderline ECG  When compared with ECG of 30-SEP-2022 15:16,  No significant change was found  Confirmed by Juanita Cassette ([id number]) on 10/6/2022 3:56:58 PM         Anesthesia Evaluation  Patient summary reviewed and Nursing notes reviewed no history of anesthetic complications:   Airway: Mallampati: II  TM distance: <3 FB   Neck ROM: limited  Mouth opening: < 3 FB   Dental:    (+) edentulous  Comment: Pt denies any loose or chipped dentition    Pulmonary:normal exam    (+) shortness of breath: new,      (-) not a current smoker                           Cardiovascular:  Exercise tolerance: poor (<4 METS), Pt uses walker  (+) hypertension:, angina: at rest, CHF:, GONZALEZ: after ambulating 1 flight of stairs,       ECG reviewed  Rhythm: regular  Rate: normal    Stress test reviewed       Beta Blocker:  Not on Beta Blocker      ROS comment: Pt states he was sitting in bed when he had a tingling pain 4/10 in L chest, and both arms had a tingling sensation-- only lasted for a few seconds per patient     Neuro/Psych:   Negative Neuro/Psych ROS  (+) CVA (> 20 yrs ago):, neuromuscular disease:, TIA,             GI/Hepatic/Renal:   (+) hiatal hernia, GERD ( moderate to severe  ): poorly controlled,          ROS comment:     Dysphagia  enalarged prostate  . Endo/Other:    (+) : arthritis:., .                 Abdominal:             Vascular: negative vascular ROS. Other Findings:             Anesthesia Plan      general     ASA 3       Induction: intravenous. MIPS: Postoperative opioids intended and Prophylactic antiemetics administered. Anesthetic plan and risks discussed with patient. Use of blood products discussed with patient whom consented to blood products. Plan discussed with attending and CRNA.                     Blanca Perez MD   10/24/2022

## 2022-10-24 NOTE — PROGRESS NOTES
Comprehensive Nutrition Assessment    Type and Reason for Visit:  Initial, Positive Nutrition Screen    Nutrition Recommendations/Plan:   Advance diet when medically appropriate. When diet advances, will start appropriate nutritional supplementation to help meet nutritional needs. Please consult dietary if tube feeding recommendations are needed. Malnutrition Assessment:  Malnutrition Status: At risk for malnutrition (Comment) (10/24/22 1347)    Context:  Acute Illness     Findings of the 6 clinical characteristics of malnutrition:  Energy Intake:  Mild decrease in energy intake (Comment) (since admission)  Weight Loss:  Unable to assess (d/t lack of actual weight history)     Body Fat Loss:  Unable to assess (d/t pt off floor at this time)     Muscle Mass Loss:  Unable to assess (d/t pt off floor at this time)    Fluid Accumulation:  No significant fluid accumulation     Strength:  Not Performed    Nutrition Assessment:    Patient currently NPO for planned hiatal hernia repair ; possible gastropexy with gastrostomy tube insertion ; noted N/V and chest pain PTA ; s/p EGD on 10/13 showing diffuse distention of the esophagus/severe esophageal dysmotility/moderate sized hiatal hernia/moderate to severe gastroesophageal reflux ; hx of CVA and dysphagia ; hx of CKD and CHF ; will provide recommendations    Nutrition Related Findings:    I&Os WNL, 2+ edema, A&O x 4, missing teeth, active BS, rounded/tenderness to abd, nausea, dysphagia ; Wound Type: Surgical Incision (Incision x 1)       Current Nutrition Intake & Therapies:    Average Meal Intake: NPO     Diet NPO    Anthropometric Measures:  Height: 5' 3\" (160 cm)  Ideal Body Weight (IBW): 124 lbs (56 kg)       Current Body Weight: 172 lb (78 kg) (10/20, actual), 138.7 % IBW.     Current BMI (kg/m2): 30.5  Usual Body Weight:  (UTO ; no actual weights available in EMR hx from previous encounters ; EMR shows past weight of 172# no method on 10/6/22) BMI Categories: Obese Class 1 (BMI 30.0-34. 9)    Estimated Daily Nutrient Needs:  Energy Requirements Based On: Formula  Weight Used for Energy Requirements: Current  Energy (kcal/day): 5873-6585 (REE 1427 x 1.2 SF)  Weight Used for Protein Requirements: Ideal  Protein (g/day): 70-85 (1.3-1.5g/kg IBW)  Method Used for Fluid Requirements: 1 ml/kcal  Fluid (ml/day): 4615-3978    Nutrition Diagnosis:   Inadequate oral intake related to swallowing difficulty (2/2 severe esophageal dysmotility) as evidenced by poor intake prior to admission, NPO or clear liquid status due to medical condition, GI abnormality    Nutrition Interventions:   Food and/or Nutrient Delivery: Continue NPO  Nutrition Education/Counseling: Education not indicated  Coordination of Nutrition Care: Continue to monitor while inpatient       Goals:  Previous Goal Met: Progressing toward Goal(s)  Goals: Meet at least 75% of estimated needs, by next RD assessment       Nutrition Monitoring and Evaluation:   Behavioral-Environmental Outcomes: None Identified  Food/Nutrient Intake Outcomes: Diet Advancement/Tolerance  Physical Signs/Symptoms Outcomes: Biochemical Data, Chewing or Swallowing, GI Status, Fluid Status or Edema, Hemodynamic Status, Nutrition Focused Physical Findings, Skin, Weight, Nausea or Vomiting    Discharge Planning:     Too soon to determine     Brooks KEERTHI Skelton, LD  Contact: [phone]

## 2022-10-25 LAB
ANION GAP SERPL CALCULATED.3IONS-SCNC: 17 MMOL/L (ref 7–16)
BUN BLDV-MCNC: 28 MG/DL (ref 6–23)
CALCIUM SERPL-MCNC: 9 MG/DL (ref 8.6–10.2)
CHLORIDE BLD-SCNC: 105 MMOL/L (ref 98–107)
CO2: 20 MMOL/L (ref 22–29)
CREAT SERPL-MCNC: 1.7 MG/DL (ref 0.7–1.2)
EKG ATRIAL RATE: 76 BPM
EKG P AXIS: 12 DEGREES
EKG P-R INTERVAL: 136 MS
EKG Q-T INTERVAL: 412 MS
EKG QRS DURATION: 70 MS
EKG QTC CALCULATION (BAZETT): 463 MS
EKG R AXIS: -13 DEGREES
EKG T AXIS: 5 DEGREES
EKG VENTRICULAR RATE: 76 BPM
GFR SERPL CREATININE-BSD FRML MDRD: 42 ML/MIN/1.73
GLUCOSE BLD-MCNC: 131 MG/DL (ref 74–99)
HCT VFR BLD CALC: 40.9 % (ref 37–54)
HEMOGLOBIN: 13.6 G/DL (ref 12.5–16.5)
MCH RBC QN AUTO: 31.4 PG (ref 26–35)
MCHC RBC AUTO-ENTMCNC: 33.3 % (ref 32–34.5)
MCV RBC AUTO: 94.5 FL (ref 80–99.9)
PDW BLD-RTO: 12.8 FL (ref 11.5–15)
PLATELET # BLD: 265 E9/L (ref 130–450)
PMV BLD AUTO: 11 FL (ref 7–12)
POTASSIUM SERPL-SCNC: 4.2 MMOL/L (ref 3.5–5)
RBC # BLD: 4.33 E12/L (ref 3.8–5.8)
SODIUM BLD-SCNC: 142 MMOL/L (ref 132–146)
WBC # BLD: 13 E9/L (ref 4.5–11.5)

## 2022-10-25 PROCEDURE — 6370000000 HC RX 637 (ALT 250 FOR IP): Performed by: STUDENT IN AN ORGANIZED HEALTH CARE EDUCATION/TRAINING PROGRAM

## 2022-10-25 PROCEDURE — 85027 COMPLETE CBC AUTOMATED: CPT

## 2022-10-25 PROCEDURE — 80048 BASIC METABOLIC PNL TOTAL CA: CPT

## 2022-10-25 PROCEDURE — 6360000002 HC RX W HCPCS: Performed by: STUDENT IN AN ORGANIZED HEALTH CARE EDUCATION/TRAINING PROGRAM

## 2022-10-25 PROCEDURE — A4216 STERILE WATER/SALINE, 10 ML: HCPCS | Performed by: STUDENT IN AN ORGANIZED HEALTH CARE EDUCATION/TRAINING PROGRAM

## 2022-10-25 PROCEDURE — 36415 COLL VENOUS BLD VENIPUNCTURE: CPT

## 2022-10-25 PROCEDURE — 2140000000 HC CCU INTERMEDIATE R&B

## 2022-10-25 PROCEDURE — 93010 ELECTROCARDIOGRAM REPORT: CPT | Performed by: INTERNAL MEDICINE

## 2022-10-25 PROCEDURE — 2580000003 HC RX 258: Performed by: STUDENT IN AN ORGANIZED HEALTH CARE EDUCATION/TRAINING PROGRAM

## 2022-10-25 PROCEDURE — 6370000000 HC RX 637 (ALT 250 FOR IP)

## 2022-10-25 PROCEDURE — C9113 INJ PANTOPRAZOLE SODIUM, VIA: HCPCS | Performed by: STUDENT IN AN ORGANIZED HEALTH CARE EDUCATION/TRAINING PROGRAM

## 2022-10-25 RX ORDER — METHOCARBAMOL 750 MG/1
750 TABLET, FILM COATED ORAL 4 TIMES DAILY
Status: DISCONTINUED | OUTPATIENT
Start: 2022-10-25 | End: 2022-11-01 | Stop reason: HOSPADM

## 2022-10-25 RX ADMIN — SODIUM CHLORIDE, PRESERVATIVE FREE 40 MG: 5 INJECTION INTRAVENOUS at 01:28

## 2022-10-25 RX ADMIN — SODIUM CHLORIDE, PRESERVATIVE FREE 40 MG: 5 INJECTION INTRAVENOUS at 23:48

## 2022-10-25 RX ADMIN — FAMOTIDINE 10 MG: 20 TABLET ORAL at 09:10

## 2022-10-25 RX ADMIN — SUCRALFATE 1 G: 1 TABLET ORAL at 13:03

## 2022-10-25 RX ADMIN — METHOCARBAMOL 750 MG: 750 TABLET ORAL at 21:04

## 2022-10-25 RX ADMIN — SENNOSIDES 17.2 MG: 8.6 TABLET, FILM COATED ORAL at 21:05

## 2022-10-25 RX ADMIN — METHOCARBAMOL 750 MG: 750 TABLET ORAL at 13:03

## 2022-10-25 RX ADMIN — FAMOTIDINE 10 MG: 20 TABLET ORAL at 21:05

## 2022-10-25 RX ADMIN — OXYCODONE 5 MG: 5 TABLET ORAL at 06:42

## 2022-10-25 RX ADMIN — OXYCODONE 5 MG: 5 TABLET ORAL at 14:53

## 2022-10-25 RX ADMIN — SUCRALFATE 1 G: 1 TABLET ORAL at 06:43

## 2022-10-25 RX ADMIN — HYDROMORPHONE HYDROCHLORIDE 0.25 MG: 1 INJECTION, SOLUTION INTRAMUSCULAR; INTRAVENOUS; SUBCUTANEOUS at 21:15

## 2022-10-25 RX ADMIN — METOCLOPRAMIDE 10 MG: 5 INJECTION, SOLUTION INTRAMUSCULAR; INTRAVENOUS at 14:53

## 2022-10-25 RX ADMIN — OXYCODONE 5 MG: 5 TABLET ORAL at 23:45

## 2022-10-25 RX ADMIN — CARVEDILOL 25 MG: 25 TABLET, FILM COATED ORAL at 07:46

## 2022-10-25 RX ADMIN — OXYCODONE 5 MG: 5 TABLET ORAL at 10:13

## 2022-10-25 RX ADMIN — METOCLOPRAMIDE 10 MG: 5 INJECTION, SOLUTION INTRAMUSCULAR; INTRAVENOUS at 02:23

## 2022-10-25 RX ADMIN — SUCRALFATE 1 G: 1 TABLET ORAL at 17:47

## 2022-10-25 RX ADMIN — SUCRALFATE 1 G: 1 TABLET ORAL at 23:48

## 2022-10-25 RX ADMIN — SUCRALFATE 1 G: 1 TABLET ORAL at 00:17

## 2022-10-25 RX ADMIN — HEPARIN SODIUM 5000 UNITS: 10000 INJECTION INTRAVENOUS; SUBCUTANEOUS at 21:17

## 2022-10-25 RX ADMIN — METOCLOPRAMIDE 10 MG: 5 INJECTION, SOLUTION INTRAMUSCULAR; INTRAVENOUS at 09:10

## 2022-10-25 RX ADMIN — METOCLOPRAMIDE 10 MG: 5 INJECTION, SOLUTION INTRAMUSCULAR; INTRAVENOUS at 21:05

## 2022-10-25 RX ADMIN — HYDROMORPHONE HYDROCHLORIDE 0.25 MG: 1 INJECTION, SOLUTION INTRAMUSCULAR; INTRAVENOUS; SUBCUTANEOUS at 07:47

## 2022-10-25 RX ADMIN — HEPARIN SODIUM 5000 UNITS: 10000 INJECTION INTRAVENOUS; SUBCUTANEOUS at 13:24

## 2022-10-25 RX ADMIN — METHOCARBAMOL 750 MG: 750 TABLET ORAL at 10:13

## 2022-10-25 RX ADMIN — CARVEDILOL 25 MG: 25 TABLET, FILM COATED ORAL at 17:47

## 2022-10-25 RX ADMIN — SENNOSIDES 17.2 MG: 8.6 TABLET, FILM COATED ORAL at 09:09

## 2022-10-25 RX ADMIN — LOSARTAN POTASSIUM 100 MG: 50 TABLET, FILM COATED ORAL at 09:09

## 2022-10-25 RX ADMIN — OXYCODONE 5 MG: 5 TABLET ORAL at 18:42

## 2022-10-25 RX ADMIN — METHOCARBAMOL 750 MG: 750 TABLET ORAL at 17:47

## 2022-10-25 RX ADMIN — HYDROMORPHONE HYDROCHLORIDE 0.25 MG: 1 INJECTION, SOLUTION INTRAMUSCULAR; INTRAVENOUS; SUBCUTANEOUS at 14:04

## 2022-10-25 RX ADMIN — AMLODIPINE BESYLATE 10 MG: 10 TABLET ORAL at 09:10

## 2022-10-25 RX ADMIN — HEPARIN SODIUM 5000 UNITS: 10000 INJECTION INTRAVENOUS; SUBCUTANEOUS at 06:46

## 2022-10-25 RX ADMIN — SODIUM CHLORIDE, PRESERVATIVE FREE 40 MG: 5 INJECTION INTRAVENOUS at 13:04

## 2022-10-25 ASSESSMENT — PAIN DESCRIPTION - LOCATION
LOCATION: ABDOMEN

## 2022-10-25 ASSESSMENT — PAIN DESCRIPTION - DESCRIPTORS
DESCRIPTORS: ACHING;DISCOMFORT
DESCRIPTORS: DISCOMFORT;ACHING

## 2022-10-25 ASSESSMENT — PAIN SCALES - GENERAL
PAINLEVEL_OUTOF10: 6
PAINLEVEL_OUTOF10: 5
PAINLEVEL_OUTOF10: 10
PAINLEVEL_OUTOF10: 4

## 2022-10-25 ASSESSMENT — PAIN DESCRIPTION - ORIENTATION
ORIENTATION: MID
ORIENTATION: MID

## 2022-10-25 NOTE — PROGRESS NOTES
Hospitalist Progress Note      SYNOPSIS: Patient admitted on 10/20/2022 for     Patient was recently hospitalized discharged on . Patient presented with chest pain. CT chest showed large hiatal hernia. Upper GI series showed diffuse distention of the esophagus, severe esophageal dysmotility moderate sized hiatal hernia moderate to severe gastroesophageal reflux however examination was severely limited. EGD on  distal LA grade C esophagitis irregular Z-line at 30 cm large hiatal hernia containing fluid with gastric pinch at 40 cm fibrous food debris in the fundus normal stomach and normal duodenum. Patient presented to emergency room again on 10/20, this time with nausea vomiting chest pain sore throat over the past few days prior to her presentation. He had vomiting since he left the hospital.    He was found to have incarcerated paraesophageal hernia status post laparoscopic robotic assisted paraesophageal hernia repair with gastropexy and gastrostomy tube placement on . SUBJECTIVE:    Patient seen and examined in his room. Denies any chest pain, nausea, vomiting. Complains of abdominal pain at the surgical site. Records reviewed. Stable overnight. No other overnight issues reported. Temp (24hrs), Av.9 °F (36.6 °C), Min:97.2 °F (36.2 °C), Max:98.7 °F (37.1 °C)    DIET: ADULT DIET; Clear Liquid  CODE: Prior    Intake/Output Summary (Last 24 hours) at 10/25/2022 0840  Last data filed at 10/25/2022 0512  Gross per 24 hour   Intake 3000 ml   Output 1575 ml   Net 1425 ml       OBJECTIVE:    BP (!) 140/47   Pulse 96   Temp 98.6 °F (37 °C) (Oral)   Resp 20   Ht 5' 3\" (1.6 m)   Wt 172 lb (78 kg)   SpO2 94%   BMI 30.47 kg/m²     General appearance: No apparent distress, appears stated age and cooperative. HEENT:  Conjunctivae/corneas clear. Neck: Supple. No jugular venous distention.    Respiratory: Clear to auscultation bilaterally, normal respiratory effort  Cardiovascular: Regular rate rhythm, normal S1-S2  Abdomen: Soft, nontender, nondistended. Abdominal surgical site and PEG tube noted. Musculoskeletal: No clubbing, cyanosis, no bilateral lower extremity edema. Brisk capillary refill. Skin:  No rashes  on visible skin  Neurologic: awake, alert and following commands     Assessment:  Chest pain  Large hiatal hernia  Severe GERD  Hypertension  CKD stage III, baseline creatinine 1.3-1.5  HFpEF-EF 55%  VHD-moderate MS, mild TR, mild pulmonary hypertension  Obesity, Body mass index is 30.47 kg/m². Plan:  GS and cardiology following  incarcerated paraesophageal hernia status post laparoscopic robotic assisted paraesophageal hernia repair with gastropexy and gastrostomy tube placement on 9/24. PPI BID, carafate  Reglan   Gentle IVF, once tube feeds started, discontinue IV fluids  Monitor renal fxn    DISPOSITION:   Possible discharge in the next 24 to 48 hours. Medications:  REVIEWED DAILY    Infusion Medications   Scheduled Medications    heparin (porcine)  5,000 Units SubCUTAneous 3 times per day    metoclopramide  10 mg IntraVENous Q6H    carvedilol  25 mg Oral BID WC    senna  2 tablet Oral BID    amLODIPine  10 mg Oral Daily    losartan  100 mg Oral Daily    sucralfate  1 g Oral 4 times per day    famotidine  10 mg Oral BID    pantoprazole (PROTONIX) 40 mg injection  40 mg IntraVENous Q12H     PRN Meds: oxyCODONE, HYDROmorphone, prochlorperazine, acetaminophen, aluminum & magnesium hydroxide-simethicone, perflutren lipid microspheres    Labs:     Recent Labs     10/24/22  0443 10/25/22  0503   WBC 5.8 13.0*   HGB 14.1 13.6   HCT 42.0 40.9    265       Recent Labs     10/24/22  0443 10/25/22  0503    142   K 4.5 4.2    105   CO2 18* 20*   BUN 23 28*   CREATININE 1.6* 1.7*   CALCIUM 9.4 9.0       No results for input(s): PROT, ALB, ALKPHOS, ALT, AST, BILITOT, AMYLASE, LIPASE in the last 72 hours.     No results for input(s): INR in the last 72 hours. No results for input(s): Domenica Harley in the last 72 hours. Chronic labs:    Lab Results   Component Value Date    TSH 1.490 10/06/2022    INR 1.1 09/30/2022       Radiology: REVIEWED DAILY    +++++++++++++++++++++++++++++++++++++++++++++++++  Leandra Morley MD  [facility] Physician - [address] [place] [address]  +++++++++++++++++++++++++++++++++++++++++++++++++  NOTE: This report was transcribed using voice recognition software. Every effort was made to ensure accuracy; however, inadvertent computerized transcription errors may be present.

## 2022-10-25 NOTE — PROGRESS NOTES
GENERAL SURGERY  DAILY PROGRESS NOTE  10/25/2022    Subjective:  No acute events overnight. Patient complaining of mild epigastric and lower chest pain postoperatively. Otherwise, patient tolerating water postop. Objective:  BP (!) 140/47   Pulse 96   Temp 98.6 °F (37 °C) (Oral)   Resp 20   Ht 5' 3\" (1.6 m)   Wt 172 lb (78 kg)   SpO2 94%   BMI 30.47 kg/m²     General Appearance:  awake, alert, oriented, in no acute distress  Skin:  Skin color, texture, turgor normal  Head/face:  NCAT  Eyes:  No gross abnormalities. Sclera nonicteric  Lungs/Chest:  Normal expansion. No respiratory distress. On room air  Heart: Warm throughout. Regular rate   Abdomen:  Soft, appropriately tender periincisional, nondistended. Incisions noted to be clean/dry/intact with skin glue in place. Gastrostomy tube in appropriate position.   Extremities: Extremities warm to touch, pink    Assessment/Plan:  66 y.o. male with recurrent incarcerated paraesophageal hernia s/p laparoscopic robotic assisted paraesophageal hernia repair with gastropexy and gastrostomy tube placement 10/24     -Continue PPI  -Continue clear liquid diet  -Start trickle tube feeds via G-tube this a.m. to assist with nutritional status  -PT OT  -Start Robaxin    Electronically signed by Sherrell Mckeon DO on 10/25/2022 at 8:36 AM     Patient seen and examined  Having chest pains and incisional pains which are to be expected  HR in 90s  WBC 13  Continue CLD and supplement with trickle TFs  PT/OT    Electronically signed by Dangelo Grnaados DO on 10/25/2022 at 9:43 AM

## 2022-10-25 NOTE — CARE COORDINATION
Care Coordination: Per nursing, pt does not want to return to [facility]. I spoke to pt at the bedside, he states he wants to go to [place], spoke to [facility]. He must return to [facility] and the will work with placement thereafter . Will need to update [facility] with plan of care needed post dc to make sure they can accommodate. [id number] [address]

## 2022-10-25 NOTE — OP NOTE
[address]                  [address] [facility], [address] [place] [address]                                OPERATIVE REPORT    PATIENT NAME: Don Collazo                  :        1950  MED REC NO:   [id number]                            ROOM:       6414  ACCOUNT NO:   [de-identified]                           ADMIT DATE: 10/20/2022  PROVIDER:     Soheila Hsu DO    DATE OF PROCEDURE:  10/24/2022    PREOPERATIVE DIAGNOSIS:  Recurrent incarcerated paraesophageal hernia. POSTOPERATIVE DIAGNOSIS:  Recurrent incarcerated paraesophageal hernia. PROCEDURES:  Laparoscopic robotic-assisted paraesophageal hernia repair,  gastropexy, gastrostomy tube, and esophagogastroduodenoscopy. SURGEON:  Soheila Hsu DO    ASSISTANT:  Jefferson Ribeiro, PGY-4    ANESTHESIA:  General.    ESTIMATED BLOOD LOSS:  Minimal.    COMPLICATIONS:  None. FINDINGS:  A large incarcerated paraesophageal hernia. HISTORY OF PROCEDURE:  The patient is a 21-year-old male who has a  history of a previous open hiatal hernia repair with Toupet  fundoplication and Heller myotomy. He comes in with complaints of  nausea, vomiting with a recent EGD showing a large portion of the  stomach incarcerated into his chest.  He was unable to eat and Surgery  was consulted for possible repair. Risks, benefits, and alternatives of  the procedure were discussed with him. He was agreeable to the  procedure. DESCRIPTION OF PROCEDURE:  The patient was brought to the operating  room, placed supine on the OR table. Sequential compression devices  were placed on the patient's lower extremities and functioning. The  patient received 2 gm of Ancef as a preoperative antibiotic. General  anesthesia was obtained as per the Anesthesia record. The patient was  prepped with ChloraPrep and draped in the usual sterile fashion. Surgical time-out was performed, agreed upon by all parties prior to the  incision. A 5-mm left upper quadrant incision was made. Veress needle  was placed in the abdomen and confirmed with saline drop test.  The  abdomen was then insufflated to 15 mmHg and then a 8-mm trocar was  placed in left upper quadrant. Camera was inserted and the abdomen was  inspected. There was a significant amount of adhesions that was noted  throughout the abdomen due to his prior hernia repair. A spot on the  left lateral abdomen was identified free of any adhesions and another  robotic trocar was placed over there and using a blunt grasper, some of  the adhesions were taken down to be able to put another trocar in the  right mid abdomen as well. Using a combination of blunt and sharp  dissection, the adhesions from the previous surgery were carefully taken  down with hot scissors until the anterior abdominal wall was cleared to  be able to put two other trocars and our Loretta retractor into the  abdomen. Lysis of adhesions occurred for greater than 45 minutes to  ensure proper visualization for the hernia repair. Two more robotic  trocars were placed, a supraumbilical and a left mid abdomen. The  [facility] liver retractor was placed subxiphoid in appropriate position. The robot was then docked. Once the robot was docked, initial  dissection occurred in the anterior erin. The stomach was attempted to  be pulled down back into the abdomen, but this was incarcerated and  unable to be pulled down. Using the Synchroseal and hot sharp scissors,  the peritoneum around the anterior erin was incised carefully. Combination of sharp and blunt dissection occurred to circumferentially  take down the recurrent incarcerated paraesophageal hernia sac. Some of  the crural attachments to the stomach needed to be taken down as well to  appropriately identify the plane. Dissection was very difficult due to  his previous open repair.   Careful dissection continued to be occurred  up into the mediastinum to separate the hernia sac from the pericardium  and the left and right pleura. Once the anterior hernia sac was taken  down, dissection occurred down posteriorly to the confluence of the  crura. There were significant attachments inferiorly to the aorta. These were carefully taken down. Once the posterior attachments were  taken down, the entire stomach was able to be reduced back into the  abdomen without any tension and without it recurring back up into the  Abdomen. There were some bleeding points on the hernia sac that were  cauterized. Next, an esophagogastroduodenoscopy was performed. This  was performed to ensure there was no injury to the esophagus or to the  stomach. The scope was passed down the esophagus into the stomach. The  stomach was insufflated and then there was irrigation placed on the  stomach and the gastroesophageal junction. There were no bubbles upon  laparoscopic observation and then there was no water or signs of air  bubbles escaping during the EGD. The scope was then removed after the  air was suctioned out of the stomach. After identifying the stomach  down into the abdomen, it was noted that there was a previous Toupet  fundoplication. This was left alone at this point. Next, the crura  were approximated with a 0 V-Loc nonabsorbable suture. This was  performed to ensure there was no pinching of the esophagus. After this  was performed, using 0 V-Loc absorbable x2. The stomach was then pexied  to the anterior abdominal wall. Next, the [facility] retractor was then  removed and this was going to be the spot for our G-tube. Next a 0 silk  suture was placed distal to where pexy was, and these were brought out  through the G-tube site with the trocar passer. A gastrotomy was made  and then a 22-Indonesian replacement G-tube was placed into the stomach and  the balloon was blown up, and this was brought to the anterior abdominal  wall.   The two sutures were then tied down to ensure the G-tube was  adequately secured. The abdomen was then desufflated and all the  trocars were removed. The incisions were injected with local  anesthetic. Needle, sponge, instrument count was correct x2. DISPOSITION:  The patient was extubated and then sent to PACU in stable  condition. The patient will be sent to the floor after appropriate PACU  recovery.         Dimple Hawkins DO    D: 10/24/2022 16:13:07       T: 10/24/2022 16:18:22     [email or web address]  Job#: [id number]     Doc#: [id number]    CC:

## 2022-10-26 LAB
ANION GAP SERPL CALCULATED.3IONS-SCNC: 10 MMOL/L (ref 7–16)
BUN BLDV-MCNC: 28 MG/DL (ref 6–23)
CALCIUM SERPL-MCNC: 8.9 MG/DL (ref 8.6–10.2)
CHLORIDE BLD-SCNC: 107 MMOL/L (ref 98–107)
CO2: 26 MMOL/L (ref 22–29)
CREAT SERPL-MCNC: 1.5 MG/DL (ref 0.7–1.2)
GFR SERPL CREATININE-BSD FRML MDRD: 49 ML/MIN/1.73
GLUCOSE BLD-MCNC: 131 MG/DL (ref 74–99)
HCT VFR BLD CALC: 39.7 % (ref 37–54)
HEMOGLOBIN: 13.4 G/DL (ref 12.5–16.5)
MCH RBC QN AUTO: 31.2 PG (ref 26–35)
MCHC RBC AUTO-ENTMCNC: 33.8 % (ref 32–34.5)
MCV RBC AUTO: 92.5 FL (ref 80–99.9)
PDW BLD-RTO: 13.2 FL (ref 11.5–15)
PLATELET # BLD: 240 E9/L (ref 130–450)
PMV BLD AUTO: 10.9 FL (ref 7–12)
POTASSIUM SERPL-SCNC: 3.8 MMOL/L (ref 3.5–5)
RBC # BLD: 4.29 E12/L (ref 3.8–5.8)
SODIUM BLD-SCNC: 143 MMOL/L (ref 132–146)
WBC # BLD: 11.2 E9/L (ref 4.5–11.5)

## 2022-10-26 PROCEDURE — 6370000000 HC RX 637 (ALT 250 FOR IP): Performed by: STUDENT IN AN ORGANIZED HEALTH CARE EDUCATION/TRAINING PROGRAM

## 2022-10-26 PROCEDURE — 6360000002 HC RX W HCPCS: Performed by: STUDENT IN AN ORGANIZED HEALTH CARE EDUCATION/TRAINING PROGRAM

## 2022-10-26 PROCEDURE — 80048 BASIC METABOLIC PNL TOTAL CA: CPT

## 2022-10-26 PROCEDURE — 36415 COLL VENOUS BLD VENIPUNCTURE: CPT

## 2022-10-26 PROCEDURE — A4216 STERILE WATER/SALINE, 10 ML: HCPCS | Performed by: STUDENT IN AN ORGANIZED HEALTH CARE EDUCATION/TRAINING PROGRAM

## 2022-10-26 PROCEDURE — 2580000003 HC RX 258: Performed by: STUDENT IN AN ORGANIZED HEALTH CARE EDUCATION/TRAINING PROGRAM

## 2022-10-26 PROCEDURE — 6370000000 HC RX 637 (ALT 250 FOR IP)

## 2022-10-26 PROCEDURE — C9113 INJ PANTOPRAZOLE SODIUM, VIA: HCPCS | Performed by: STUDENT IN AN ORGANIZED HEALTH CARE EDUCATION/TRAINING PROGRAM

## 2022-10-26 PROCEDURE — 2140000000 HC CCU INTERMEDIATE R&B

## 2022-10-26 PROCEDURE — 85027 COMPLETE CBC AUTOMATED: CPT

## 2022-10-26 RX ORDER — ACETAMINOPHEN 325 MG/1
650 TABLET ORAL EVERY 8 HOURS SCHEDULED
Status: DISCONTINUED | OUTPATIENT
Start: 2022-10-26 | End: 2022-11-01 | Stop reason: HOSPADM

## 2022-10-26 RX ADMIN — HEPARIN SODIUM 5000 UNITS: 10000 INJECTION INTRAVENOUS; SUBCUTANEOUS at 21:24

## 2022-10-26 RX ADMIN — HYDROMORPHONE HYDROCHLORIDE 0.25 MG: 1 INJECTION, SOLUTION INTRAMUSCULAR; INTRAVENOUS; SUBCUTANEOUS at 02:46

## 2022-10-26 RX ADMIN — SODIUM CHLORIDE, PRESERVATIVE FREE 40 MG: 5 INJECTION INTRAVENOUS at 14:02

## 2022-10-26 RX ADMIN — METOCLOPRAMIDE 10 MG: 5 INJECTION, SOLUTION INTRAMUSCULAR; INTRAVENOUS at 08:47

## 2022-10-26 RX ADMIN — OXYCODONE 5 MG: 5 TABLET ORAL at 17:58

## 2022-10-26 RX ADMIN — OXYCODONE 5 MG: 5 TABLET ORAL at 06:03

## 2022-10-26 RX ADMIN — SENNOSIDES 17.2 MG: 8.6 TABLET, FILM COATED ORAL at 08:46

## 2022-10-26 RX ADMIN — HEPARIN SODIUM 5000 UNITS: 10000 INJECTION INTRAVENOUS; SUBCUTANEOUS at 13:57

## 2022-10-26 RX ADMIN — SENNOSIDES 17.2 MG: 8.6 TABLET, FILM COATED ORAL at 21:14

## 2022-10-26 RX ADMIN — METHOCARBAMOL 750 MG: 750 TABLET ORAL at 08:46

## 2022-10-26 RX ADMIN — FAMOTIDINE 10 MG: 20 TABLET ORAL at 08:46

## 2022-10-26 RX ADMIN — OXYCODONE 5 MG: 5 TABLET ORAL at 13:58

## 2022-10-26 RX ADMIN — SUCRALFATE 1 G: 1 TABLET ORAL at 13:57

## 2022-10-26 RX ADMIN — METHOCARBAMOL 750 MG: 750 TABLET ORAL at 17:58

## 2022-10-26 RX ADMIN — CARVEDILOL 25 MG: 25 TABLET, FILM COATED ORAL at 17:59

## 2022-10-26 RX ADMIN — LOSARTAN POTASSIUM 100 MG: 50 TABLET, FILM COATED ORAL at 08:47

## 2022-10-26 RX ADMIN — HEPARIN SODIUM 5000 UNITS: 10000 INJECTION INTRAVENOUS; SUBCUTANEOUS at 06:12

## 2022-10-26 RX ADMIN — ACETAMINOPHEN 650 MG: 325 TABLET, FILM COATED ORAL at 21:14

## 2022-10-26 RX ADMIN — ACETAMINOPHEN 650 MG: 325 TABLET, FILM COATED ORAL at 08:47

## 2022-10-26 RX ADMIN — SUCRALFATE 1 G: 1 TABLET ORAL at 06:03

## 2022-10-26 RX ADMIN — ACETAMINOPHEN 650 MG: 325 TABLET, FILM COATED ORAL at 13:57

## 2022-10-26 RX ADMIN — CARVEDILOL 25 MG: 25 TABLET, FILM COATED ORAL at 08:47

## 2022-10-26 RX ADMIN — AMLODIPINE BESYLATE 10 MG: 10 TABLET ORAL at 08:47

## 2022-10-26 RX ADMIN — SUCRALFATE 1 G: 1 TABLET ORAL at 17:59

## 2022-10-26 RX ADMIN — METHOCARBAMOL 750 MG: 750 TABLET ORAL at 13:57

## 2022-10-26 RX ADMIN — METOCLOPRAMIDE 10 MG: 5 INJECTION, SOLUTION INTRAMUSCULAR; INTRAVENOUS at 02:47

## 2022-10-26 RX ADMIN — METHOCARBAMOL 750 MG: 750 TABLET ORAL at 21:14

## 2022-10-26 RX ADMIN — OXYCODONE 5 MG: 5 TABLET ORAL at 10:14

## 2022-10-26 RX ADMIN — FAMOTIDINE 10 MG: 20 TABLET ORAL at 21:14

## 2022-10-26 ASSESSMENT — PAIN DESCRIPTION - LOCATION
LOCATION: ABDOMEN
LOCATION: ABDOMEN;THROAT
LOCATION: ABDOMEN;THROAT
LOCATION: ABDOMEN

## 2022-10-26 ASSESSMENT — PAIN DESCRIPTION - DESCRIPTORS
DESCRIPTORS: DISCOMFORT;ACHING
DESCRIPTORS: ACHING
DESCRIPTORS: ACHING

## 2022-10-26 ASSESSMENT — PAIN SCALES - GENERAL
PAINLEVEL_OUTOF10: 9
PAINLEVEL_OUTOF10: 5
PAINLEVEL_OUTOF10: 6
PAINLEVEL_OUTOF10: 0
PAINLEVEL_OUTOF10: 9

## 2022-10-26 NOTE — PROGRESS NOTES
Hospitalist Progress Note      SYNOPSIS: Patient admitted on 10/20/2022 for     Patient was recently hospitalized discharged on . Patient presented with chest pain. CT chest showed large hiatal hernia. Upper GI series showed diffuse distention of the esophagus, severe esophageal dysmotility moderate sized hiatal hernia moderate to severe gastroesophageal reflux however examination was severely limited. EGD on  distal LA grade C esophagitis irregular Z-line at 30 cm large hiatal hernia containing fluid with gastric pinch at 40 cm fibrous food debris in the fundus normal stomach and normal duodenum. Patient presented to emergency room again on 10/20, this time with nausea vomiting chest pain sore throat over the past few days prior to her presentation. He had vomiting since he left the hospital.    He was found to have incarcerated paraesophageal hernia status post laparoscopic robotic assisted paraesophageal hernia repair with gastropexy and gastrostomy tube placement on . SUBJECTIVE:    Patient seen and examined in his room. Denies any chest pain, nausea, vomiting. Overall feels better. Tolerating tube feeds well. Records reviewed. Stable overnight. No other overnight issues reported. Temp (24hrs), Av.7 °F (37.1 °C), Min:98.2 °F (36.8 °C), Max:99 °F (37.2 °C)    DIET: ADULT DIET; Clear Liquid  ADULT TUBE FEEDING; Gastrostomy; Standard with Fiber; Continuous; 20; No; 30; Q 6 hours; Wound Healing; via G tube with tube feeds  CODE: Prior    Intake/Output Summary (Last 24 hours) at 10/26/2022 0824  Last data filed at 10/25/2022 1804  Gross per 24 hour   Intake 480 ml   Output 500 ml   Net -20 ml       OBJECTIVE:    BP (!) 158/75   Pulse 76   Temp 98.6 °F (37 °C) (Oral)   Resp 20   Ht 5' 3\" (1.6 m)   Wt 172 lb (78 kg)   SpO2 94%   BMI 30.47 kg/m²     General appearance: No apparent distress, appears stated age and cooperative.   HEENT:  Conjunctivae/corneas clear.   Neck: Supple. No jugular venous distention. Respiratory: Clear to auscultation bilaterally, normal respiratory effort  Cardiovascular: Regular rate rhythm, normal S1-S2  Abdomen: Soft, nontender, nondistended. Abdominal surgical site and PEG tube noted. Musculoskeletal: No clubbing, cyanosis, no bilateral lower extremity edema. Brisk capillary refill. Skin:  No rashes  on visible skin  Neurologic: awake, alert and following commands     Assessment:  Chest pain  Large hiatal hernia  Severe GERD  Hypertension  CKD stage III, baseline creatinine 1.3-1.5  HFpEF-EF 55%  VHD-moderate MS, mild TR, mild pulmonary hypertension  Obesity, Body mass index is 30.47 kg/m². Plan:  GS and cardiology following  incarcerated paraesophageal hernia status post laparoscopic robotic assisted paraesophageal hernia repair with gastropexy and gastrostomy tube placement on 10/24.   Tube feeds started on 10/25, plan to uptitrate to maximum level on 10/26  PPI BID, carafate  Reglan   Gentle IVF, once tube feeds started, discontinue IV fluids  Monitor renal fxn    DISPOSITION:   Plan to discharge on 10/27    Medications:  REVIEWED DAILY    Infusion Medications   Scheduled Medications    acetaminophen  650 mg Oral 3 times per day    methocarbamol  750 mg Oral 4x Daily    heparin (porcine)  5,000 Units SubCUTAneous 3 times per day    metoclopramide  10 mg IntraVENous Q6H    carvedilol  25 mg Oral BID WC    senna  2 tablet Oral BID    amLODIPine  10 mg Oral Daily    losartan  100 mg Oral Daily    sucralfate  1 g Oral 4 times per day    famotidine  10 mg Oral BID    pantoprazole (PROTONIX) 40 mg injection  40 mg IntraVENous Q12H     PRN Meds: oxyCODONE, HYDROmorphone, prochlorperazine, aluminum & magnesium hydroxide-simethicone, perflutren lipid microspheres    Labs:     Recent Labs     10/24/22  0443 10/25/22  0503 10/26/22  0456   WBC 5.8 13.0* 11.2   HGB 14.1 13.6 13.4   HCT 42.0 40.9 39.7    265 240       Recent Labs 10/24/22  0443 10/25/22  0503 10/26/22  0456    142 143   K 4.5 4.2 3.8    105 107   CO2 18* 20* 26   BUN 23 28* 28*   CREATININE 1.6* 1.7* 1.5*   CALCIUM 9.4 9.0 8.9       No results for input(s): PROT, ALB, ALKPHOS, ALT, AST, BILITOT, AMYLASE, LIPASE in the last 72 hours. No results for input(s): INR in the last 72 hours. No results for input(s): Estle Carrow in the last 72 hours. Chronic labs:    Lab Results   Component Value Date    TSH 1.490 10/06/2022    INR 1.1 09/30/2022       Radiology: REVIEWED DAILY    +++++++++++++++++++++++++++++++++++++++++++++++++  Daniela Granger MD  [facility] Physician - [address] [place] [address]  +++++++++++++++++++++++++++++++++++++++++++++++++  NOTE: This report was transcribed using voice recognition software. Every effort was made to ensure accuracy; however, inadvertent computerized transcription errors may be present.

## 2022-10-26 NOTE — PROGRESS NOTES
GENERAL SURGERY  DAILY PROGRESS NOTE  10/26/2022    Subjective:  Patient complaining of some abdominal soreness that is somewhat better than prior. Patient denies any nausea/vomiting overnight. Patient states he tolerated clear liquids but did not like the taste of broth. Objective:  BP (!) 158/75   Pulse 76   Temp (!) 35.6 °F (2 °C)   Resp 16   Ht 5' 3\" (1.6 m)   Wt 172 lb (78 kg)   SpO2 94%   BMI 30.47 kg/m²     General Appearance:  awake, alert, oriented, in no acute distress  Skin:  Skin color, texture, turgor normal  Head/face:  NCAT  Eyes:  No gross abnormalities. Sclera nonicteric  Lungs/Chest:  Normal expansion. No respiratory distress. On room air  Heart: Warm throughout. Regular rate   Abdomen:  Soft, appropriately tender periincisional, nondistended. Incisions noted to be clean/dry/intact with skin glue in place. Gastrostomy tube in appropriate position. Extremities: Extremities warm to touch, pink    Assessment/Plan:  66 y.o. male with recurrent incarcerated paraesophageal hernia s/p laparoscopic robotic assisted paraesophageal hernia repair with gastropexy and gastrostomy tube placement 10/24     -Continue PPI  -Change Tylenol to scheduled and continue Robaxin for pain control  -Advance to full liquid diet  -Add nutritional liquid supplements to diet.  -Continue tube feeds at a rate of 20 while inpatient. Patient will not require tube feeds at discharge.   -PT OT    Electronically signed by Bam Boss DO on 10/26/2022 at 12:38 PM       Continues to improve  Pain better controlled  FLD only for two weeks  TFs at night only while inpatient   [id number] Chiara Friedman for discharge from surgical POV on full liquid diet and no lifting more than 15lbs for 4 weeks    Electronically signed by Coral Novoa DO on 10/26/2022 at 3:12 PM

## 2022-10-26 NOTE — CARE COORDINATION
Per nursing, patient continues on tube feeds at a rate of 20 and tolerating clear liquids. Per general surgery, patient will not require tube feeds at discharge. Patient to return to [facility] [phone]) at discharge.     ARNALDO Kaiser, LSW [phone]

## 2022-10-27 LAB
ANION GAP SERPL CALCULATED.3IONS-SCNC: 11 MMOL/L (ref 7–16)
BUN BLDV-MCNC: 22 MG/DL (ref 6–23)
CALCIUM SERPL-MCNC: 8.8 MG/DL (ref 8.6–10.2)
CHLORIDE BLD-SCNC: 109 MMOL/L (ref 98–107)
CO2: 26 MMOL/L (ref 22–29)
CREAT SERPL-MCNC: 1.3 MG/DL (ref 0.7–1.2)
GFR SERPL CREATININE-BSD FRML MDRD: 58 ML/MIN/1.73
GLUCOSE BLD-MCNC: 130 MG/DL (ref 74–99)
HCT VFR BLD CALC: 39.7 % (ref 37–54)
HEMOGLOBIN: 13.2 G/DL (ref 12.5–16.5)
MCH RBC QN AUTO: 30.9 PG (ref 26–35)
MCHC RBC AUTO-ENTMCNC: 33.2 % (ref 32–34.5)
MCV RBC AUTO: 93 FL (ref 80–99.9)
PDW BLD-RTO: 13.2 FL (ref 11.5–15)
PLATELET # BLD: 231 E9/L (ref 130–450)
PMV BLD AUTO: 11.2 FL (ref 7–12)
POTASSIUM SERPL-SCNC: 3.2 MMOL/L (ref 3.5–5)
RBC # BLD: 4.27 E12/L (ref 3.8–5.8)
SODIUM BLD-SCNC: 146 MMOL/L (ref 132–146)
WBC # BLD: 8.9 E9/L (ref 4.5–11.5)

## 2022-10-27 PROCEDURE — 6370000000 HC RX 637 (ALT 250 FOR IP): Performed by: STUDENT IN AN ORGANIZED HEALTH CARE EDUCATION/TRAINING PROGRAM

## 2022-10-27 PROCEDURE — 36415 COLL VENOUS BLD VENIPUNCTURE: CPT

## 2022-10-27 PROCEDURE — C9113 INJ PANTOPRAZOLE SODIUM, VIA: HCPCS | Performed by: STUDENT IN AN ORGANIZED HEALTH CARE EDUCATION/TRAINING PROGRAM

## 2022-10-27 PROCEDURE — 2580000003 HC RX 258: Performed by: STUDENT IN AN ORGANIZED HEALTH CARE EDUCATION/TRAINING PROGRAM

## 2022-10-27 PROCEDURE — 6360000002 HC RX W HCPCS: Performed by: STUDENT IN AN ORGANIZED HEALTH CARE EDUCATION/TRAINING PROGRAM

## 2022-10-27 PROCEDURE — 1200000000 HC SEMI PRIVATE

## 2022-10-27 PROCEDURE — 85027 COMPLETE CBC AUTOMATED: CPT

## 2022-10-27 PROCEDURE — A4216 STERILE WATER/SALINE, 10 ML: HCPCS | Performed by: STUDENT IN AN ORGANIZED HEALTH CARE EDUCATION/TRAINING PROGRAM

## 2022-10-27 PROCEDURE — 80048 BASIC METABOLIC PNL TOTAL CA: CPT

## 2022-10-27 PROCEDURE — 6360000002 HC RX W HCPCS: Performed by: INTERNAL MEDICINE

## 2022-10-27 PROCEDURE — 6370000000 HC RX 637 (ALT 250 FOR IP)

## 2022-10-27 RX ORDER — CARVEDILOL 25 MG/1
25 TABLET ORAL 2 TIMES DAILY WITH MEALS
Qty: 60 TABLET | Refills: 3 | Status: SHIPPED | OUTPATIENT
Start: 2022-10-27

## 2022-10-27 RX ORDER — POTASSIUM CHLORIDE 7.45 MG/ML
10 INJECTION INTRAVENOUS ONCE
Status: COMPLETED | OUTPATIENT
Start: 2022-10-27 | End: 2022-10-27

## 2022-10-27 RX ORDER — AMLODIPINE BESYLATE 10 MG/1
10 TABLET ORAL DAILY
Qty: 30 TABLET | Refills: 3 | Status: SHIPPED | OUTPATIENT
Start: 2022-10-27

## 2022-10-27 RX ADMIN — SUCRALFATE 1 G: 1 TABLET ORAL at 00:25

## 2022-10-27 RX ADMIN — SUCRALFATE 1 G: 1 TABLET ORAL at 12:35

## 2022-10-27 RX ADMIN — ACETAMINOPHEN 650 MG: 325 TABLET, FILM COATED ORAL at 21:43

## 2022-10-27 RX ADMIN — SODIUM CHLORIDE, PRESERVATIVE FREE 40 MG: 5 INJECTION INTRAVENOUS at 12:35

## 2022-10-27 RX ADMIN — POTASSIUM CHLORIDE 10 MEQ: 7.46 INJECTION, SOLUTION INTRAVENOUS at 11:48

## 2022-10-27 RX ADMIN — METHOCARBAMOL 750 MG: 750 TABLET ORAL at 21:44

## 2022-10-27 RX ADMIN — FAMOTIDINE 10 MG: 20 TABLET ORAL at 21:44

## 2022-10-27 RX ADMIN — SENNOSIDES 17.2 MG: 8.6 TABLET, FILM COATED ORAL at 21:43

## 2022-10-27 RX ADMIN — SODIUM CHLORIDE, PRESERVATIVE FREE 40 MG: 5 INJECTION INTRAVENOUS at 00:26

## 2022-10-27 RX ADMIN — OXYCODONE 5 MG: 5 TABLET ORAL at 06:16

## 2022-10-27 RX ADMIN — SENNOSIDES 17.2 MG: 8.6 TABLET, FILM COATED ORAL at 09:00

## 2022-10-27 RX ADMIN — SUCRALFATE 1 G: 1 TABLET ORAL at 06:11

## 2022-10-27 RX ADMIN — ACETAMINOPHEN 650 MG: 325 TABLET, FILM COATED ORAL at 06:11

## 2022-10-27 RX ADMIN — HEPARIN SODIUM 5000 UNITS: 10000 INJECTION INTRAVENOUS; SUBCUTANEOUS at 21:44

## 2022-10-27 RX ADMIN — HEPARIN SODIUM 5000 UNITS: 10000 INJECTION INTRAVENOUS; SUBCUTANEOUS at 06:32

## 2022-10-27 RX ADMIN — AMLODIPINE BESYLATE 10 MG: 10 TABLET ORAL at 08:59

## 2022-10-27 RX ADMIN — METHOCARBAMOL 750 MG: 750 TABLET ORAL at 08:59

## 2022-10-27 RX ADMIN — CARVEDILOL 25 MG: 25 TABLET, FILM COATED ORAL at 17:33

## 2022-10-27 RX ADMIN — CARVEDILOL 25 MG: 25 TABLET, FILM COATED ORAL at 08:59

## 2022-10-27 RX ADMIN — METHOCARBAMOL 750 MG: 750 TABLET ORAL at 17:33

## 2022-10-27 RX ADMIN — FAMOTIDINE 10 MG: 20 TABLET ORAL at 08:59

## 2022-10-27 RX ADMIN — SUCRALFATE 1 G: 1 TABLET ORAL at 17:33

## 2022-10-27 RX ADMIN — POTASSIUM CHLORIDE 10 MEQ: 7.46 INJECTION, SOLUTION INTRAVENOUS at 09:32

## 2022-10-27 RX ADMIN — LOSARTAN POTASSIUM 100 MG: 50 TABLET, FILM COATED ORAL at 08:59

## 2022-10-27 ASSESSMENT — PAIN DESCRIPTION - LOCATION
LOCATION: BACK
LOCATION: ABDOMEN;THROAT

## 2022-10-27 ASSESSMENT — PAIN DESCRIPTION - DESCRIPTORS
DESCRIPTORS: ACHING;THROBBING
DESCRIPTORS: ACHING

## 2022-10-27 ASSESSMENT — PAIN DESCRIPTION - ORIENTATION: ORIENTATION: LOWER

## 2022-10-27 ASSESSMENT — PAIN SCALES - GENERAL
PAINLEVEL_OUTOF10: 5
PAINLEVEL_OUTOF10: 8

## 2022-10-27 NOTE — DISCHARGE SUMMARY
Hospitalist Discharge Summary    Patient ID: Ray Villagomez   Patient : 1950  Patient's PCP: No primary care provider on file. Admit Date: 10/20/2022   Admitting Physician: Tori Richardson MD    Discharge Date:  10/27/2022   Discharge Physician: Michel Means MD   Discharge Condition: Stable  Discharge Disposition: [facility] course in brief:  (Please refer to daily progress notes for a comprehensive review of the hospitalization by requesting medical records)    Patient was recently hospitalized discharged on . Patient presented with chest pain. CT chest showed large hiatal hernia. Upper GI series showed diffuse distention of the esophagus, severe esophageal dysmotility moderate sized hiatal hernia moderate to severe gastroesophageal reflux however examination was severely limited. EGD on  distal LA grade C esophagitis irregular Z-line at 30 cm large hiatal hernia containing fluid with gastric pinch at 40 cm fibrous food debris in the fundus normal stomach and normal duodenum. Patient presented to emergency room again on 10/20, this time with nausea vomiting chest pain sore throat over the past few days prior to her presentation. He had vomiting since he left the hospital.    He was found to have incarcerated paraesophageal hernia status post laparoscopic robotic assisted paraesophageal hernia repair with gastropexy and gastrostomy tube placement on . He was tolerating tube feeds well however he started having diarrhea on the evening of 10/26. Tube feeds were held with a plan to discharge patient on oral diet with clear liquids and some soft diet. Once diarrhea improves, tube feeds can be restarted as per facility's physician instruction. KCl 20 mg IV x1 given on the day of discharge to supplement low potassium of 3.2.     Consults:   IP CONSULT TO GENERAL SURGERY  IP CONSULT TO CARDIOLOGY    Discharge Diagnoses:    Assessment:  Chest pain  Large hiatal hernia  Severe GERD  Hypertension  CKD stage III, baseline creatinine 1.3-1.5  HFpEF-EF 55%  VHD-moderate MS, mild TR, mild pulmonary hypertension  Obesity, Body mass index is 30.47 kg/m². Plan:  GS and cardiology following  incarcerated paraesophageal hernia status post laparoscopic robotic assisted paraesophageal hernia repair with gastropexy and gastrostomy tube placement on 10/24. Tube feeds started on 10/25, plan to uptitrate to maximum level on 10/26  PPI BID, carafate  Reglan   Gentle IVF, once tube feeds started, discontinue IV fluids  Monitor renal fxn    Discharge Instructions / Follow up:    Future Appointments   Date Time Provider Ernestina Jackie   11/9/2022 11:45 AM Maynor Bruno MD [facility]       Continued appropriate risk factor modification of blood pressure, diabetes and serum lipids will remain essential to reducing risk of future atherosclerotic development    Activity: activity as tolerated    Significant labs:  CBC:   Recent Labs     10/25/22  0503 10/26/22  0456 10/27/22  0449   WBC 13.0* 11.2 8.9   RBC 4.33 4.29 4.27   HGB 13.6 13.4 13.2   HCT 40.9 39.7 39.7   MCV 94.5 92.5 93.0   RDW 12.8 13.2 13.2    240 231     BMP:   Recent Labs     10/25/22  0503 10/26/22  0456 10/27/22  0449    143 146   K 4.2 3.8 3.2*    107 109*   CO2 20* 26 26   BUN 28* 28* 22   CREATININE 1.7* 1.5* 1.3*     LFT:  No results for input(s): PROT, ALB, ALKPHOS, ALT, AST, BILITOT, AMYLASE, LIPASE in the last 72 hours. PT/INR: No results for input(s): INR, APTT in the last 72 hours. BNP: No results for input(s): BNP in the last 72 hours.   Hgb A1C: No results found for: LABA1C  Folate and B12: No results found for: [id number], No results found for: FOLATE  Thyroid Studies:   Lab Results   Component Value Date    TSH 1.490 10/06/2022       Urinalysis:    Lab Results   Component Value Date/Time    NITRU Negative 10/06/2022 09:34 AM    WBCUA NONE 10/06/2022 09:34 AM BACTERIA NONE SEEN 10/06/2022 09:34 AM    RBCUA NONE 10/06/2022 09:34 AM    BLOODU Negative 10/06/2022 09:34 AM    SPECGRAV >=1.030 10/06/2022 09:34 AM    GLUCOSEU Negative 10/06/2022 09:34 AM       Imaging:  XR CHEST (2 VW)    Result Date: 10/9/2022  EXAMINATION: TWO XRAY VIEWS OF THE CHEST 10/9/2022 8:12 am COMPARISON: October 6, 2022 HISTORY: ORDERING SYSTEM PROVIDED HISTORY: pneumomnia eval TECHNOLOGIST PROVIDED HISTORY: Reason for exam:->pneumomnia eval What reading provider will be dictating this exam?->CRC FINDINGS: No airspace opacity or pleural effusion. The heart is normal size. No pneumothorax. No free air beneath the hemidiaphragms. Large hiatal hernia. No pneumonia or pleural effusion. CT ABDOMEN PELVIS W IV CONTRAST Additional Contrast? None    Result Date: 10/20/2022  EXAMINATION: CT OF THE ABDOMEN AND PELVIS WITH CONTRAST 10/20/2022 4:05 am TECHNIQUE: CT of the abdomen and pelvis was performed with the administration of intravenous contrast. Multiplanar reformatted images are provided for review. Automated exposure control, iterative reconstruction, and/or weight based adjustment of the mA/kV was utilized to reduce the radiation dose to as low as reasonably achievable. COMPARISON: None. HISTORY: ORDERING SYSTEM PROVIDED HISTORY: abdominal pain TECHNOLOGIST PROVIDED HISTORY: Reason for exam:->abdominal pain Additional Contrast?->None Decision Support Exception - unselect if not a suspected or confirmed emergency medical condition->Emergency Medical Condition (MA) What reading provider will be dictating this exam?->CRC FINDINGS: Lower Chest: Moderate retrocardiac hiatal hernia. Mild basilar atelectasis. No pleural or pericardial effusions. Organs: Liver, biliary tree, bilateral adrenal glands, bilateral kidneys, spleen and pancreas are normal. GI/Bowel: Mild descending and sigmoid colonic diverticulosis. No diverticulitis. Appendical Oth within the otherwise normal appendix.   No bowel inflammatory process detected. No ileus or obstruction. Pelvis: No free fluid or adenopathy. Peritoneum/Retroperitoneum: Aortic atherosclerosis without aneurysm. No acute features. Bones/Soft Tissues: Mild lumbar spondylosis. 1. No acute disease. RECOMMENDATIONS: Careful clinical correlation and follow up recommended. FL UGI W KUB    Result Date: 10/12/2022  EXAMINATION: DOUBLE CONTRAST UPPER GI SERIES 10/11/2022 TECHNIQUE: Double contrast upper GI series was performed with barium and air contrast. FLUOROSCOPY DOSE AND TYPE OR TIME AND EXPOSURES: Fluoroscopy time 2.8 minutes, Air Kerma 141 mGy. 33 fluoroscopic/radiographic images saved in PACS. COMPARISON: None. Correlated with CTA of the chest from October 6, 2022. HISTORY: ORDERING SYSTEM PROVIDED HISTORY: hiatal hernia dysphagia TECHNOLOGIST PROVIDED HISTORY: Reason for exam:->hiatal hernia dysphagia What reading provider will be dictating this exam?->CRC FINDINGS: Please note that the examination is significantly limited, as the patient is unable to stand. This led to limited amount of contrast ingestion. Positioning other than standard AP view was also significantly limited. There is diffuse esophageal dilation with a moderate-sized hiatal hernia. Severe tertiary esophageal contractions/esophageal dysmotility was noted. Moderate to severe gastroesophageal reflux was noted. There is no obvious mucosal abnormality involving the stomach. No obstructing lesion is appreciated. Severely limited examination with diffuse distension of the esophagus, severe esophageal dysmotility and a moderate-sized hiatal hernia. Moderate to severe gastroesophageal reflux is also present.      XR CHEST PORTABLE    Result Date: 10/20/2022  EXAMINATION: ONE XRAY VIEW OF THE CHEST 10/20/2022 3:29 am COMPARISON: October 9, 2022 HISTORY: ORDERING SYSTEM PROVIDED HISTORY: chest pain TECHNOLOGIST PROVIDED HISTORY: Reason for exam:->chest pain What reading provider will be SYSTEM PROVIDED HISTORY: ro pe TECHNOLOGIST PROVIDED HISTORY: Reason for exam:->ro pe Decision Support Exception - unselect if not a suspected or confirmed emergency medical condition->Emergency Medical Condition (MA) What reading provider will be dictating this exam?->CRC FINDINGS: No filling defect in the pulmonary arteries to suggest pulmonary arterial embolism. The heart appears normal in size. No pericardial effusion. Calcifications are associated with the aortic valve. No airspace opacity or pleural effusion. No pneumothorax. Large hiatal hernia. View of the upper abdomen show severe calcified and noncalcified plaque involving the abdominal aorta. 1. No evidence of pulmonary embolism or acute pulmonary abnormality. 2. Large hiatal hernia. 3. View of the upper abdomen show severe calcified and noncalcified atherosclerotic plaque involving the abdominal aorta. NM Cardiac Stress Test Nuclear Imaging    Result Date: 10/8/2022  Indication:  Chest Pain Clinical History:   Patient has no known history of coronary artery disease. Procedure:  Pharmacologic stress testing was performed with Regadenoson 0.4 mg for 15 seconds. IMAGING: Myocardial perfusion imaging was performed at rest 30-35 minutes following the intravenous injection of 13.1 mCi of (Tc-Sestamibi) followed by 10 ml of Normal Saline. As per infusion protocol, the patient was injected intravenously with 39.7 mCi of (Tc-Sestamibi) followed by 10 ml of Normal Saline. Gated post-stress tomographic imaging was performed 20-25 minutes after stress. FINDINGS: The overall quality of the study was good. Left ventricular cavity size was noted to be normal. Rotational analog analysis demonstrated no patient motion, extracardiac activity or attenuation artifact. The gated SPECT stress imaging in the short, vertical long, and horizontal long axis demonstrated normal homogeneous tracer distribution throughout the myocardium.    The resting images show no change. Gated SPECT left ventricular ejection fraction was calculated to be 69% with normal wall motion and thickening. TID ratio 1.21 Low-dose noncontrast chest CT used for attenuation correction showed extensive coronary artery calcification suggestive severe plaque burden. Large hiatal hernia was noted. 1.  ECG during the infusion did not change. 2.  The myocardial perfusion imaging was normal without ischemia or infarct. 3.  Overall left ventricular systolic function was normal without regional wall motion abnormalities. 4.  No transient ischemic dilatation. 5.  Severe plaque burden based on noncontrast chest CT used for attenuation correction 6. Low risk general pharmacologic stress test. Thank you for sending your patient to this [employer]. Michelle Soria MD, [facility] cardiology.        Discharge Medications:      Medication List        START taking these medications      amLODIPine 10 MG tablet  Commonly known as: NORVASC  Take 1 tablet by mouth daily     carvedilol 25 MG tablet  Commonly known as: COREG  Take 1 tablet by mouth 2 times daily (with meals)            CONTINUE taking these medications      docusate sodium 100 MG capsule  Commonly known as: COLACE     loperamide 2 MG capsule  Commonly known as: IMODIUM     losartan 25 MG tablet  Commonly known as: COZAAR  Take 1 tablet by mouth daily     pantoprazole 40 MG tablet  Commonly known as: PROTONIX  Take 1 tablet by mouth 2 times daily (before meals)     sucralfate 1 GM tablet  Commonly known as: CARAFATE  Take 1 tablet by mouth 4 times daily               Where to Get Your Medications        You can get these medications from any pharmacy    Bring a paper prescription for each of these medications  amLODIPine 10 MG tablet  carvedilol 25 MG tablet         Time Spent on discharge is more than 45 minutes in the examination, evaluation, counseling and review of medications and discharge plan.    +++++++++++++++++++++++++++++++++++++++++++++++++  Mj Barreto MD  [facility] Physician - [address] [place] [address]  +++++++++++++++++++++++++++++++++++++++++++++++++  NOTE: This report was transcribed using voice recognition software. Every effort was made to ensure accuracy; however, inadvertent computerized transcription errors may be present.

## 2022-10-27 NOTE — DISCHARGE INSTR - COC
Continuity of Care Form    Patient Name: Ramone Durand   :  1950  MRN:  [id number]    Admit date:  10/20/2022  Discharge date:10/31/22      Code Status Order: Prior   Advance Directives:     Admitting Physician:  Vern Escalona MD  PCP: No primary care provider on file. Discharging Nurse: ANGÉLICA [facility]/Room#: 8719/2777-P  Discharging Unit Phone Number: [phone]    Emergency Contact:   No emergency contact information on file. Past Surgical History:  Past Surgical History:   Procedure Laterality Date    ESOPHAGEAL DILATATION      HIATAL HERNIA REPAIR N/A 10/24/2022    ROBOTIC HIATAL HERNIA REPAIR, PLACEMENT [facility] N/A 10/13/2022    EGD ESOPHAGOGASTRODUODENOSCOPY performed by Hector Hernandez MD at [facility] 10/24/2022    EGD DIAGNOSTIC ONLY performed by Lissette Russ DO at [address] [facility]       Immunization History: There is no immunization history on file for this patient.     Active Problems:  Patient Active Problem List   Diagnosis Code    Community acquired pneumonia of both lower lobes J18.9    Paraesophageal hernia K44.9    Dysphagia R13.10    Chest pain R07.9    PVC's (premature ventricular contractions) I49.3    Primary hypertension I10    History of CVA (cerebrovascular accident) Z86.73    Nonrheumatic aortic valve stenosis I35.0    Nonrheumatic tricuspid valve regurgitation I36.1    Pulmonary HTN (HCC) I27.20    Hiatal hernia with GERD K44.9, K21.9    Moderate protein-calorie malnutrition ([facility] [address] E44.0       Isolation/Infection:   Isolation            No Isolation          Patient Infection Status       Infection Onset Added Last Indicated Last Indicated By Review Planned Expiration Resolved Resolved By    None active    Resolved    COVID-19 (Rule Out) 10/07/22 10/07/22 10/07/22 Respiratory Panel, Molecular, with COVID-19 (Restricted: peds pts or suitable admitted adults) (Ordered)   10/07/22 Rule-Out Test Resulted    COVID-19 (Rule Out) 09/30/22 09/30/22 09/30/22 COVID-19, Rapid (Ordered)   09/30/22 Rule-Out Test Resulted            Nurse Assessment:  Last Vital Signs: BP (!) 156/56   Pulse 80   Temp 98.2 °F (36.8 °C) (Oral)   Resp 17   Ht 5' 3\" (1.6 m)   Wt 172 lb (78 kg)   SpO2 93%   BMI 30.47 kg/m²     Last documented pain score (0-10 scale): Pain Level: 5  Last Weight:   Wt Readings from Last 1 Encounters:   10/20/22 172 lb (78 kg)     Mental Status:  oriented and alert    IV Access:  - None    Nursing Mobility/ADLs:  Walking   Independent  Transfer  Independent  Bathing  Independent  Dressing  Independent  Toileting  Independent  Feeding  Independent  Med Admin  Assisted  Med Delivery   crushed    Wound Care Documentation and Therapy:  Incision 10/24/22 Abdomen Medial;Upper (Active)   Dressing Status Clean;Dry; Intact 10/26/22 0740   Dressing/Treatment Surgical glue 10/24/22 1800   Closure Sutures;Surgical glue 10/24/22 1618   Drainage Amount None 10/24/22 1800   Rhina-incision Assessment Intact 10/24/22 1800   Number of days: 2        Elimination:  Continence: Bowel: Yes  Bladder: Yes  Urinary Catheter: None   Colostomy/Ileostomy/Ileal Conduit: No       Date of Last BM: 10/30/22      Intake/Output Summary (Last 24 hours) at 10/27/2022 0827  Last data filed at 10/27/2022 0026  Gross per 24 hour   Intake 615 ml   Output 1550 ml   Net -935 ml     I/O last 3 completed shifts: In: 615 [NG/GT:615]  Out: [place] [Urine:1550]    Safety Concerns: At Risk for Falls    Impairments/Disabilities:      None    Nutrition Therapy:  Current Nutrition Therapy:   - Oral Diet:  General    Routes of Feeding: Oral  Liquids:  Thin Liquids  Daily Fluid Restriction: no  Last Modified Barium Swallow with Video (Video Swallowing Test): not done    Treatments at the Time of Hospital Discharge:   Respiratory Treatments: 0    Oxygen Therapy:  is not on home oxygen therapy. Ventilator:    - No ventilator support    Rehab Therapies: Physical Therapy and Occupational Therapy  Weight Bearing Status/Restrictions: No weight bearing restrictions  Other Medical Equipment (for information only, NOT a DME order):  ***  Other Treatments: n/a      Patient's personal belongings (please select all that are sent with patient):  None    RN SIGNATURE:  Electronically signed by Zoey Zamora RN on 10/31/22 at 2:19 PM EDT    CASE MANAGEMENT/SOCIAL WORK SECTION    Inpatient Status Date: 10/22/22    Readmission Risk Assessment Score:  Readmission Risk              Risk of Unplanned Readmission:  17           Discharging to Facility/ Agency   Name: [facility] ([facility])  Address:  [address] [place] [address]  Phone:  [phone]  Fax:  [phone]    Dialysis Facility (if applicable)   Name:  Address:  Dialysis Schedule:  Phone:  Fax:    / signature: Electronically signed by JOURDAN Cope on 10/31/2022 at 2:28 PM      PHYSICIAN SECTION    Prognosis: Fair    Condition at Discharge: Stable    Rehab Potential (if transferring to Rehab): Fair    Recommended Labs or Other Treatments After Discharge: BC and CMP in 2 days. Start on oral liquid and soft diet if patient tolerates. Resume tube feeds once diarrhea improves. Physician Certification: I certify the above information and transfer of Levell Peer  is necessary for the continuing treatment of the diagnosis listed and that he requires Intermediate Nursing Care for greater 30 days.      Update Admission H&P: No change in H&P    PHYSICIAN SIGNATURE:  Electronically signed by Telma Renteria MD on 10/27/22 at 8:28 AM EDT

## 2022-10-27 NOTE — PROGRESS NOTES
Patient complained of having diarrhea from his tube feed to Surgical team and tube feed was stopped by surgical team.

## 2022-10-27 NOTE — DISCHARGE INSTRUCTIONS
Patient Discharge Instructions  [facility] Officer DO  Laparascopic Hiatal Hernia Repair    RESUME ACTIVITY:      BATHING:  May shower 24hrs after surgery, remove dressings after 24hrs if in place, leave steristrips in place as they will fall of independently. You may have adhesive glue covering your incisions which will dissolve on it own. May bathe or swim 5 days after surgery    DRIVING: No driving while on pain medications    RETURN TO WORK: after follow up appointment    WALKING:  Yes    SEXUAL ACTIVITY: Yes    STAIRS:  Yes    LIFTING: Less than 15 pounds for 4 weeks    DIET: LIQUID DIET FOR 2 WEEKS    Your Care Instructions  Having esophageal surgery limits the foods you can eat for a few weeks. This makes it hard to eat. You will have to drink your meals or eat only very soft foods, such as mashed potatoes or pureed foods. Liquid meal supplements, such as Ensure and Boost, help make sure that you get protein, vitamins, and minerals. They also have high-calorie versions, so you can make sure that you keep your weight up. Your doctor may suggest that you see a dietitian to help you plan meals. Follow-up care is a key part of your treatment and safety. Be sure to make and go to all appointments, and call your doctor if you are having problems. It's also a good idea to know your test results and keep a list of the medicines you take. How can you care for yourself at home? Eat soups that have been put in a  (pureed), milkshakes, baby food, or any foods you like that are liquid or pureed. You can puree vegetables or fruits in a  or . Put milk or soy milk with yogurt or ice cream and fruit in a  to make milkshakes. Drink liquid supplements, such as Ensure or Boost, one or two times a day. Try to drink or eat liquid foods 6 times a day rather than 3 times a day. Sit up while you eat. Swallow slowly to keep from choking.   This surgical procedure does require diet restrictions after surgery. You will need to stay on a liquid/soft diet for approximately 2 weeks after surgery. During that time, you can try or experiment with eating soft, mushy foods like tuna, mashed potatoes, eggs, cottage cheese, and thick soups. The reason for liquids is that there will be some swelling in your esophagus where your hernia was repaired. You may also notice that swallowing feels a little tight - this will improves as the swelling goes down. With time, you will be able to digest foods normally. ALSO, DO NOT DRINK CARBONATED BEVERAGES FOR THREE WEEKS. Some patients find that their appetite is poor or that foods dont taste well after surgery. This is a normal result of the stress of surgery and manipulation inside the belly - your appetite should return in several weeks. If you do not eat, this is OK; the most important thing is to drink liquids. If you find you are persistently nauseated or unable to take in liquids, contact our office and let us know. SPECIAL INSTRUCTIONS:     Call physician if they or any other problems occur:  Fever over 101°    Redness, swelling, hardness or warmth at the operative site  Unrelieved nausea    Foul smelling or cloudy drainage at the operative site   Unrelieved pain    Blood soaked dressing. (Some oozing may be normal)    Call office for follow up appointment with Dr Blaire Oshea in 2 weeks. Call the office if you have a fever > 100 F, or if your incision becomes red, tender, or drains more than a small amount of clear fluid. BOWELS: constipation is a side effect of your pain meds, take a daily laxative (miralax, dulcolax, etc.) as needed to keep your bowels moving as they normally do, do not go 2-3 days without having a bowel movement. Pain medications; Percocet- take at least 1/2 pill every 6 hours the first 36 hours after surgery, and may take as many as 2 pills every 4 hours.  After the first 36hours only take the pills as needed and stop them as soon as possible. Pain meds cause constipation so pay close attention to the \"bowels\" topic above. Keep incisions clean and dry. Vicodin/Percocet and ibuprofen for pain as prescribed. Okay to resume anticoagulant medication after 24hrs. Do not exceed 4000mg of Tylenol/Acetaminophen per day. Vicodin/Norco/Percocet contain acetaminophen. Do not take additional amounts of Tylenol if you are taking these medications.

## 2022-10-27 NOTE — CARE COORDINATION
Care Coordination: Spoke to [facility]   he recommended [employer] [address] in [facility] or [address] He is aware a referral has been made to [facility] in [place]. I spoke to ΣΑΡΑΝΤΙ at [facility], it appears they can accept but will need to run through billing. They will also need specific orders from Surgery. They will have to speak to  and get clearance to take him out of [facility].  Likely this will not occur today and hopefully he can be set up for morning if all completed and he will need Covid testing completed    Kike Garcia

## 2022-10-27 NOTE — CARE COORDINATION
Care Coordination: Isabelle from [address] [place] [address] does not feel [facility] can care for patient . She has provided TOMMY Heller number . He is checking facilities to see if pt can get accepted. In the meantime, spoke to Audi Paul at [facility], no referral made and they would not have accepted as well. Spoke to Catina Starr at [facility] [place]  [phone]  will need clinical sent to review   sent to . Will await acceptance and pt will need therapy evals as well.       Ferne Severs

## 2022-10-28 LAB
ANION GAP SERPL CALCULATED.3IONS-SCNC: 13 MMOL/L (ref 7–16)
BUN BLDV-MCNC: 19 MG/DL (ref 6–23)
CALCIUM SERPL-MCNC: 9.2 MG/DL (ref 8.6–10.2)
CHLORIDE BLD-SCNC: 104 MMOL/L (ref 98–107)
CO2: 25 MMOL/L (ref 22–29)
CREAT SERPL-MCNC: 1.1 MG/DL (ref 0.7–1.2)
GFR SERPL CREATININE-BSD FRML MDRD: >60 ML/MIN/1.73
GLUCOSE BLD-MCNC: 118 MG/DL (ref 74–99)
HCT VFR BLD CALC: 43.1 % (ref 37–54)
HEMOGLOBIN: 14.3 G/DL (ref 12.5–16.5)
MCH RBC QN AUTO: 31 PG (ref 26–35)
MCHC RBC AUTO-ENTMCNC: 33.2 % (ref 32–34.5)
MCV RBC AUTO: 93.3 FL (ref 80–99.9)
PDW BLD-RTO: 13.2 FL (ref 11.5–15)
PLATELET # BLD: 232 E9/L (ref 130–450)
PMV BLD AUTO: 10.9 FL (ref 7–12)
POTASSIUM SERPL-SCNC: 3.4 MMOL/L (ref 3.5–5)
RBC # BLD: 4.62 E12/L (ref 3.8–5.8)
SODIUM BLD-SCNC: 142 MMOL/L (ref 132–146)
WBC # BLD: 8.9 E9/L (ref 4.5–11.5)

## 2022-10-28 PROCEDURE — 85027 COMPLETE CBC AUTOMATED: CPT

## 2022-10-28 PROCEDURE — A4216 STERILE WATER/SALINE, 10 ML: HCPCS | Performed by: STUDENT IN AN ORGANIZED HEALTH CARE EDUCATION/TRAINING PROGRAM

## 2022-10-28 PROCEDURE — 6370000000 HC RX 637 (ALT 250 FOR IP): Performed by: STUDENT IN AN ORGANIZED HEALTH CARE EDUCATION/TRAINING PROGRAM

## 2022-10-28 PROCEDURE — 97530 THERAPEUTIC ACTIVITIES: CPT

## 2022-10-28 PROCEDURE — 2580000003 HC RX 258: Performed by: STUDENT IN AN ORGANIZED HEALTH CARE EDUCATION/TRAINING PROGRAM

## 2022-10-28 PROCEDURE — 6370000000 HC RX 637 (ALT 250 FOR IP)

## 2022-10-28 PROCEDURE — 97535 SELF CARE MNGMENT TRAINING: CPT

## 2022-10-28 PROCEDURE — 6360000002 HC RX W HCPCS: Performed by: STUDENT IN AN ORGANIZED HEALTH CARE EDUCATION/TRAINING PROGRAM

## 2022-10-28 PROCEDURE — 36415 COLL VENOUS BLD VENIPUNCTURE: CPT

## 2022-10-28 PROCEDURE — C9113 INJ PANTOPRAZOLE SODIUM, VIA: HCPCS | Performed by: STUDENT IN AN ORGANIZED HEALTH CARE EDUCATION/TRAINING PROGRAM

## 2022-10-28 PROCEDURE — 1200000000 HC SEMI PRIVATE

## 2022-10-28 PROCEDURE — 80048 BASIC METABOLIC PNL TOTAL CA: CPT

## 2022-10-28 RX ADMIN — HEPARIN SODIUM 5000 UNITS: 10000 INJECTION INTRAVENOUS; SUBCUTANEOUS at 05:01

## 2022-10-28 RX ADMIN — CARVEDILOL 25 MG: 25 TABLET, FILM COATED ORAL at 17:36

## 2022-10-28 RX ADMIN — SUCRALFATE 1 G: 1 TABLET ORAL at 11:46

## 2022-10-28 RX ADMIN — CARVEDILOL 25 MG: 25 TABLET, FILM COATED ORAL at 07:58

## 2022-10-28 RX ADMIN — HEPARIN SODIUM 5000 UNITS: 10000 INJECTION INTRAVENOUS; SUBCUTANEOUS at 13:29

## 2022-10-28 RX ADMIN — SODIUM CHLORIDE, PRESERVATIVE FREE 40 MG: 5 INJECTION INTRAVENOUS at 11:46

## 2022-10-28 RX ADMIN — ACETAMINOPHEN 650 MG: 325 TABLET, FILM COATED ORAL at 13:28

## 2022-10-28 RX ADMIN — SENNOSIDES 17.2 MG: 8.6 TABLET, FILM COATED ORAL at 07:58

## 2022-10-28 RX ADMIN — METHOCARBAMOL 750 MG: 750 TABLET ORAL at 13:28

## 2022-10-28 RX ADMIN — FAMOTIDINE 10 MG: 20 TABLET ORAL at 21:34

## 2022-10-28 RX ADMIN — METHOCARBAMOL 750 MG: 750 TABLET ORAL at 21:35

## 2022-10-28 RX ADMIN — LOSARTAN POTASSIUM 100 MG: 50 TABLET, FILM COATED ORAL at 07:58

## 2022-10-28 RX ADMIN — ACETAMINOPHEN 650 MG: 325 TABLET, FILM COATED ORAL at 21:35

## 2022-10-28 RX ADMIN — AMLODIPINE BESYLATE 10 MG: 10 TABLET ORAL at 07:59

## 2022-10-28 RX ADMIN — SODIUM CHLORIDE, PRESERVATIVE FREE 40 MG: 5 INJECTION INTRAVENOUS at 00:00

## 2022-10-28 RX ADMIN — SUCRALFATE 1 G: 1 TABLET ORAL at 05:00

## 2022-10-28 RX ADMIN — SUCRALFATE 1 G: 1 TABLET ORAL at 00:00

## 2022-10-28 RX ADMIN — OXYCODONE 5 MG: 5 TABLET ORAL at 21:35

## 2022-10-28 RX ADMIN — ALUMINA, MAGNESIA, AND SIMETHICONE ORAL SUSPENSION REGULAR STRENGTH 30 ML: 1200; 1200; 120 SUSPENSION ORAL at 23:14

## 2022-10-28 RX ADMIN — ACETAMINOPHEN 650 MG: 325 TABLET, FILM COATED ORAL at 05:00

## 2022-10-28 RX ADMIN — METHOCARBAMOL 750 MG: 750 TABLET ORAL at 17:36

## 2022-10-28 RX ADMIN — HEPARIN SODIUM 5000 UNITS: 10000 INJECTION INTRAVENOUS; SUBCUTANEOUS at 21:35

## 2022-10-28 RX ADMIN — SUCRALFATE 1 G: 1 TABLET ORAL at 17:38

## 2022-10-28 RX ADMIN — METHOCARBAMOL 750 MG: 750 TABLET ORAL at 07:58

## 2022-10-28 RX ADMIN — FAMOTIDINE 10 MG: 20 TABLET ORAL at 07:58

## 2022-10-28 ASSESSMENT — PAIN DESCRIPTION - PAIN TYPE: TYPE: ACUTE PAIN

## 2022-10-28 ASSESSMENT — PAIN DESCRIPTION - LOCATION: LOCATION: ABDOMEN

## 2022-10-28 ASSESSMENT — PAIN - FUNCTIONAL ASSESSMENT: PAIN_FUNCTIONAL_ASSESSMENT: PREVENTS OR INTERFERES SOME ACTIVE ACTIVITIES AND ADLS

## 2022-10-28 ASSESSMENT — PAIN SCALES - GENERAL: PAINLEVEL_OUTOF10: 3

## 2022-10-28 ASSESSMENT — PAIN DESCRIPTION - ORIENTATION: ORIENTATION: LOWER;LEFT

## 2022-10-28 ASSESSMENT — PAIN DESCRIPTION - DESCRIPTORS: DESCRIPTORS: ACHING;SORE;DISCOMFORT

## 2022-10-28 NOTE — PROGRESS NOTES
Patient is okay for discharge from surgery perspective. G tube is solely in place for stabilization purposes to pexy the stomach in place after surgery. There is no need for G tube care orders or flushes after discharge at facility. G tube should not be used for anything after discharge as patient tolerates PO intake. It will stay in place for 4 weeks at least until follow up with plans for removal at follow up.

## 2022-10-28 NOTE — PROGRESS NOTES
Physical Therapy  Physical Therapy Treatment    Name: David Michaels  : 1950  MRN: [id number]      Date of Service: 10/28/2022    Evaluating PT:  Rubina Mcdowellok, PT [id number]    Room #:  3344/0367-L  Diagnosis:  Bigeminy [I49.8]  Chest pain [R07.9]  Chest pain, unspecified type [R07.9]  Nausea and vomiting, unspecified vomiting type [R11.2]  Hiatal hernia with GERD [K44.9, K21.9]  PMHx/PSHx:     has a past medical history of Arthritis, Cerebral artery occlusion with cerebral infarction [facility]), Primary hypertension, and Prostate disorder. has a past surgical history that includes Esophagus dilation; Upper gastrointestinal endoscopy (N/A, 10/13/2022); hiatal hernia repair (N/A, 10/24/2022); and Upper gastrointestinal endoscopy (N/A, 10/24/2022). Procedure/Surgery:  none at this time. Precautions:  Falls, FWB  Equipment Needs: To be determined,    SUBJECTIVE:    Patient lives [facility] as a shelter house. States level entry. Bed is on 1 floor and bath is on 1 floor. Patient ambulated independently and with rollator  PTA. Equipment owned: Rollator,      OBJECTIVE:   Initial Evaluation  Date: 10/21/22 Treatment  10/28/22 Short Term/ Long Term   Goals   AM-PAC 6 Clicks 77/76 78/30    Was pt agreeable to Eval/treatment? yes Yes    Does pt have pain? Reporting abdominal discomfort. None     Bed Mobility  Rolling: SBA  Supine to sit:   SBA   Sit to supine: NT   Scooting: SBA  Rolling: Indep  Supine to sit:   Mod A  Sit to supine: NT   Scooting: SBA  Rolling: Ind  Supine to sit: Ind  Sit to supine: Ind  Scooting: Ind   Transfers Sit to stand: Min to fww  Stand to sit: Min  Stand pivot: Min with fww Sit to stand: Mod to fww  Stand to sit: Min  Stand pivot: Min with fww Sit to stand: Mod Ind  to fww  Stand to sit: Mod Ind    Stand pivot: Mod Ind  with fww   Ambulation    20 feet with fww Minx 2 reps. 20 feet x2 with FWW Min A 100+ feet with Mod Ind  rollator.     Stair negotiation: ascended and descended  NT NT Strength/ROM:     RLE grossly 4-/5  LLE grossly 4-/5  RLE AROM WFL  LLE AROM WFL    Balance:   Static Sitting: Ind  Dynamic Sitting: Ind  Static Standing: Min with fww  Dynamic Standing: Min with fww      Patient is Alert & Oriented x person, place, time, and situation and follows directions  Sensation:  Pt denies numbness and tingling to extremities  Edema:  none    Vitals:  RA 98%  Therapeutic Exercises:  Functional activity with fww. Patient education  Pt educated on role of Physical Therapy, risks of immobility, safety and plan of care,  importance of mobility while in hospital , and safety  and use of call light for safety. Patient response to education:   Pt verbalized understanding Pt demonstrated skill Pt requires further education in this area   yes yes Reminders     ASSESSMENT:    Conditions Requiring Skilled Therapeutic Intervention:    [x]Decreased strength     [x]Decreased ROM  [x]Decreased functional mobility  [x]Decreased balance   [x]Decreased endurance   [x]Decreased posture  []Decreased sensation  []Decreased coordination   []Decreased vision  [x]Decreased safety awareness   []Increased pain       Comments:    RN cleared patient for participation in therapy session. Results of the functional assessment are noted above. Upon entering the room patient was found supine in bed. Willing to participate in session. Transitioned to EOB with increased assist this date. Sat EOB x 10 minutes to increase dynamic sitting balance and activity tolerance, and to complete hygiene. Transfers and ambulation completed with fww. Typically uses device when home. Cues for safety. At end of session, patient in bedside chair with  call light and phone within reach,  all lines and tubes intact, nursing notified. This patient can benefit from the continuation of skilled PT  to maximize functional level and return to PLOF.      Treatment:  Patient completed and was instructed in the following treatment: Bed mobility training - pt given verbal and tactile cues to facilitate proper sequencing and safety during rolling and supine>sit as well as provided with physical assistance to complete task    Assistive device training - pt educated on using [employer] during gait, [employer] approximation/negotiation, and hand placement during sit<>stand to [employer]  STS and transfer training - educated on hand/foot placement, safety, and sequencing during STS and pivot transfers using assistive device  Gait training - verbal cues for  assistive device approximation/negotiation, upright posture, and safety during 90 and 180 degree turns during gait   Education in use of call light for safety  Vitals and symptoms were closely monitored throughout session. PHYSICAL THERAPY PLAN OF CARE:    Pt progressing toward goals slowly. POC to remain as previously noted.      Time in  0935  Time out  1000    Total Treatment Time  25 minutes     CPT codes:  [] Low Complexity PT evaluation 35443  [] Moderate Complexity PT evaluation 11082  [] High Complexity PT evaluation 33661  [] PT Re-evaluation 49037  [] Gait training 79501 - minutes  [] Manual therapy 64229  minutes  [x] Therapeutic activities 54782 25 minutes  [] Therapeutic exercises 80414 - minutes  [] Neuromuscular reeducation 77930 minutes     Ignacio Adrian, PT, DPT [id number]

## 2022-10-28 NOTE — PROGRESS NOTES
Occupational Therapy  OT BEDSIDE TREATMENT NOTE   [facility] [address] [facility]  [address] [place] [address]        RGSF:  Patient Name: Efrain Musa  MRN: [id number]  : 1950  Room: 037494     Per OT Eval:    Evaluating OT: Radha Garcias OTR/L #[id number]      Referring Provider: Zack Alba MD  Specific Provider Orders/Date: OT eval and treat 10/20/22     Diagnosis: Bigeminy [I49.8]  Chest pain [R07.9]  Chest pain, unspecified type [R07.9]  Nausea and vomiting, unspecified vomiting type [R11.2]   Pt admitted to hospital with nausea, vomiting and chest pain       Pertinent Medical History:  has a past medical history of Arthritis, Cerebral artery occlusion with cerebral infarction [facility]), Primary hypertension, and Prostate disorder.          Precautions:  Fall Risk     Assessment of current deficits    [x] Functional mobility          [x]ADLs           [x] Strength                  []Cognition    [x] Functional transfers        [x] IADLs         [x] Safety Awareness   [x]Endurance    [] Fine Coordination                        [x] Balance      [] Vision/perception   []Sensation      []Gross Motor Coordination            [] ROM           [] Delirium                   [] Motor Control      OT PLAN OF CARE   OT POC based on physician orders, patient diagnosis and results of clinical assessment     Frequency/Duration 1-3 days/wk for 2 weeks PRN   Specific OT Treatment Interventions to include:   * Instruction/training on adapted ADL techniques and AE recommendations to increase functional independence within precautions       * Training on energy conservation strategies, correct breathing pattern and techniques to improve independence/tolerance for self-care routine  * Functional transfer/mobility training/DME recommendations for increased independence, safety, and fall prevention  * Patient/Family education to increase follow through with safety techniques and functional independence  * Recommendation of environmental modifications for increased safety with functional transfers/mobility and ADLs  * Therapeutic exercise to improve motor endurance, ROM, and functional strength for ADLs/functional transfers  * Therapeutic activities to facilitate/challenge dynamic balance, stand tolerance for increased safety and independence with ADLs     Recommended Adaptive Equipment:  TBD      Home Living: Pt resides in a 1 story nursing home house with no WILBERT    Bathroom setup: walk-in shower     Equipment owned: rollator     Prior Level of Function: mod I with ADLs , mod I with IADLs; ambulated with rollator   Driving: no    Occupation: enjoys watching [employer]      Pain Level: Pt did not complain of pain this session     Cognition: A&O: 4/4; Follows 2 step directions             Memory:  good             Sequencing:  good             Problem solving:  fair             Judgement/safety:  fair               Functional Assessment:  AM-PAC Daily Activity Raw Score: 16/24    Initial Eval Status  Date: 10/21/22 Treatment Status  Date: 10/28/22 STGs = LTGs  Time frame: 10-14 days   Feeding Independent   Independent     Grooming Minimal Assist   SBA  Pt washed face, applied deodorant seated EOB Modified [place]    UB Dressing Minimal Assist   Ellis  Macario/doff hospital gown seated EOB Modified [place]    LB Dressing Moderate Assist  Ellis-socks  Pt able to doff socks, macario sock to R foot using cross over technique, assistance to macario to L foot    DNT pants this date, modA per previous session Modified [place]    Bathing Moderate Assist  modA  Simulated Task Modified [place]    Toileting Moderate Assist  DNT  modA per previous session    Pt having of mccann catheter  Modified [place]    Bed Mobility  Supine to sit: Minimal Assist   Sit to supine: NT   modA  Supine<>EOB    HOB slightly elevated Supine to sit: Modified [place]   Sit to supine: Modified [place]    Functional Transfers Minimal Assist  min/modA  Sit to Stand  Stand to Sit    Cueing for hand placement  Modified [place]    Functional Mobility Minimal Assist      Ambulated to/from bathroom with w/w [place]  Pt ambulated short distance in osei with w.w., slow pace, cueing for safety and walker management  Modified [place]    Balance Sitting:     Static:  SBA    Dynamic:SBA  Standing: min A at w/w  Sitting EOB:  SBA    Standing:  Ellis     Activity Tolerance F  Fair- F+   Visual/  Perceptual Grossly wfl     Blind R eye                       Hand Dominance right    Strength ROM Additional Info:    RUE   4/5 wfl good  and wfl FMC/dexterity noted during ADL tasks      LUE 4/5 wfl good  and wfl FMC/dexterity noted during ADL tasks      Hearing: wfl  Sensation:wfl  Tone: wfl  Edema:none noted            Education:  Pt was educated on role of OT, goals to be reached, importance of OOB activity, safety with bed mobility, safety and hand placement with transfers, safety/balance and walker management with functional mobility, and techniques to assist with LB dressing task. Comments: Upon arrival pt supine in bed, agreeable to therapy. Pt completed of bed mobility, functional mobility, transfers and light ADL tasks this session. Pt requiring max cueing for safety and follow through of tasks throughout session. At end of session, pt seated upright in chair, all lines and tubes intact, call light within reach. Pt has made fair progress towards set goals.    Continue with current plan of care focusing on increasing of independency with transfers and ADL tasks      Treatment Time In:  9:35am           Treatment Time Out: 10:00am                Treatment Charges: Mins Units   Ther Ex  58306     Manual Therapy 20998     Thera Activities 59874 17 1   ADL/Home Mgt 31175 8 1   Neuro Re-ed 41711     Group Therapy      Orthotic manage/training  79249     Non-Billable Time     Total Timed Treatment [address]OR[address]/L [id number]

## 2022-10-28 NOTE — CARE COORDINATION
Care Coordination: Spoke to Adrienne Rodríguez at [facility] again late afternoon yesterday, pt still has not been accepted- They still had not cleared through billing and would need to be cleared via  and county. She will update me this morning    Jody Acevedo    Addendum: Pt was not seen by therapy yesterday, Spoke to [address] at [phone], she is aware pt moved and will place request for pt to be seen this am. 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      Jody Acevedo

## 2022-10-29 PROCEDURE — A4216 STERILE WATER/SALINE, 10 ML: HCPCS | Performed by: STUDENT IN AN ORGANIZED HEALTH CARE EDUCATION/TRAINING PROGRAM

## 2022-10-29 PROCEDURE — 6360000002 HC RX W HCPCS: Performed by: STUDENT IN AN ORGANIZED HEALTH CARE EDUCATION/TRAINING PROGRAM

## 2022-10-29 PROCEDURE — 2580000003 HC RX 258: Performed by: STUDENT IN AN ORGANIZED HEALTH CARE EDUCATION/TRAINING PROGRAM

## 2022-10-29 PROCEDURE — 6370000000 HC RX 637 (ALT 250 FOR IP): Performed by: INTERNAL MEDICINE

## 2022-10-29 PROCEDURE — 1200000000 HC SEMI PRIVATE

## 2022-10-29 PROCEDURE — C9113 INJ PANTOPRAZOLE SODIUM, VIA: HCPCS | Performed by: STUDENT IN AN ORGANIZED HEALTH CARE EDUCATION/TRAINING PROGRAM

## 2022-10-29 PROCEDURE — 6370000000 HC RX 637 (ALT 250 FOR IP): Performed by: STUDENT IN AN ORGANIZED HEALTH CARE EDUCATION/TRAINING PROGRAM

## 2022-10-29 PROCEDURE — 6370000000 HC RX 637 (ALT 250 FOR IP)

## 2022-10-29 RX ORDER — POTASSIUM CHLORIDE 20 MEQ/1
40 TABLET, EXTENDED RELEASE ORAL ONCE
Status: COMPLETED | OUTPATIENT
Start: 2022-10-29 | End: 2022-10-29

## 2022-10-29 RX ADMIN — SODIUM CHLORIDE, PRESERVATIVE FREE 40 MG: 5 INJECTION INTRAVENOUS at 00:00

## 2022-10-29 RX ADMIN — AMLODIPINE BESYLATE 10 MG: 10 TABLET ORAL at 08:34

## 2022-10-29 RX ADMIN — METHOCARBAMOL 750 MG: 750 TABLET ORAL at 08:34

## 2022-10-29 RX ADMIN — HEPARIN SODIUM 5000 UNITS: 10000 INJECTION INTRAVENOUS; SUBCUTANEOUS at 05:24

## 2022-10-29 RX ADMIN — ALUMINA, MAGNESIA, AND SIMETHICONE ORAL SUSPENSION REGULAR STRENGTH 30 ML: 1200; 1200; 120 SUSPENSION ORAL at 08:34

## 2022-10-29 RX ADMIN — FAMOTIDINE 10 MG: 20 TABLET ORAL at 20:56

## 2022-10-29 RX ADMIN — FAMOTIDINE 10 MG: 20 TABLET ORAL at 08:34

## 2022-10-29 RX ADMIN — OXYCODONE 5 MG: 5 TABLET ORAL at 05:24

## 2022-10-29 RX ADMIN — METHOCARBAMOL 750 MG: 750 TABLET ORAL at 20:56

## 2022-10-29 RX ADMIN — ACETAMINOPHEN 650 MG: 325 TABLET, FILM COATED ORAL at 20:57

## 2022-10-29 RX ADMIN — SUCRALFATE 1 G: 1 TABLET ORAL at 05:28

## 2022-10-29 RX ADMIN — SODIUM CHLORIDE, PRESERVATIVE FREE 40 MG: 5 INJECTION INTRAVENOUS at 11:36

## 2022-10-29 RX ADMIN — HEPARIN SODIUM 5000 UNITS: 10000 INJECTION INTRAVENOUS; SUBCUTANEOUS at 20:56

## 2022-10-29 RX ADMIN — CARVEDILOL 25 MG: 25 TABLET, FILM COATED ORAL at 17:26

## 2022-10-29 RX ADMIN — LOSARTAN POTASSIUM 100 MG: 50 TABLET, FILM COATED ORAL at 08:33

## 2022-10-29 RX ADMIN — HEPARIN SODIUM 5000 UNITS: 10000 INJECTION INTRAVENOUS; SUBCUTANEOUS at 14:37

## 2022-10-29 RX ADMIN — SUCRALFATE 1 G: 1 TABLET ORAL at 11:36

## 2022-10-29 RX ADMIN — CARVEDILOL 25 MG: 25 TABLET, FILM COATED ORAL at 08:33

## 2022-10-29 RX ADMIN — POTASSIUM CHLORIDE 40 MEQ: 1500 TABLET, EXTENDED RELEASE ORAL at 09:28

## 2022-10-29 RX ADMIN — SUCRALFATE 1 G: 1 TABLET ORAL at 17:26

## 2022-10-29 RX ADMIN — OXYCODONE 5 MG: 5 TABLET ORAL at 17:26

## 2022-10-29 RX ADMIN — ACETAMINOPHEN 650 MG: 325 TABLET, FILM COATED ORAL at 13:47

## 2022-10-29 RX ADMIN — METHOCARBAMOL 750 MG: 750 TABLET ORAL at 17:26

## 2022-10-29 RX ADMIN — METHOCARBAMOL 750 MG: 750 TABLET ORAL at 11:36

## 2022-10-29 RX ADMIN — ACETAMINOPHEN 650 MG: 325 TABLET, FILM COATED ORAL at 05:24

## 2022-10-29 RX ADMIN — SUCRALFATE 1 G: 1 TABLET ORAL at 00:00

## 2022-10-29 RX ADMIN — OXYCODONE 5 MG: 5 TABLET ORAL at 11:36

## 2022-10-29 ASSESSMENT — PAIN SCALES - GENERAL
PAINLEVEL_OUTOF10: 4
PAINLEVEL_OUTOF10: 5
PAINLEVEL_OUTOF10: 5
PAINLEVEL_OUTOF10: 7
PAINLEVEL_OUTOF10: 7

## 2022-10-29 ASSESSMENT — PAIN DESCRIPTION - DESCRIPTORS
DESCRIPTORS: DISCOMFORT;TENDER
DESCRIPTORS: CRAMPING;ACHING
DESCRIPTORS: CRAMPING;ACHING;DISCOMFORT
DESCRIPTORS: ACHING;DISCOMFORT;SORE

## 2022-10-29 ASSESSMENT — PAIN DESCRIPTION - LOCATION
LOCATION: BACK;ABDOMEN
LOCATION: ABDOMEN
LOCATION: THROAT
LOCATION: BACK;ABDOMEN
LOCATION: BACK;ABDOMEN

## 2022-10-29 ASSESSMENT — PAIN - FUNCTIONAL ASSESSMENT
PAIN_FUNCTIONAL_ASSESSMENT: ACTIVITIES ARE NOT PREVENTED
PAIN_FUNCTIONAL_ASSESSMENT: ACTIVITIES ARE NOT PREVENTED

## 2022-10-29 ASSESSMENT — PAIN DESCRIPTION - ORIENTATION
ORIENTATION: RIGHT;LOWER
ORIENTATION: RIGHT;LOWER

## 2022-10-29 ASSESSMENT — PAIN DESCRIPTION - PAIN TYPE
TYPE: ACUTE PAIN
TYPE: ACUTE PAIN

## 2022-10-29 NOTE — PROGRESS NOTES
Hospitalist Progress Note      SYNOPSIS: Patient admitted on 10/20/2022 for     Patient was recently hospitalized discharged on . Patient presented with chest pain. CT chest showed large hiatal hernia. Upper GI series showed diffuse distention of the esophagus, severe esophageal dysmotility moderate sized hiatal hernia moderate to severe gastroesophageal reflux however examination was severely limited. EGD on  distal LA grade C esophagitis irregular Z-line at 30 cm large hiatal hernia containing fluid with gastric pinch at 40 cm fibrous food debris in the fundus normal stomach and normal duodenum. Patient presented to emergency room again on 10/20, this time with nausea vomiting chest pain sore throat over the past few days prior to her presentation. He had vomiting since he left the hospital.    He was found to have incarcerated paraesophageal hernia status post laparoscopic robotic assisted paraesophageal hernia repair with gastropexy and gastrostomy tube placement on . When he was started on tube feeds, he started having diarrhea. Surgery recommended holding tube feeds and feed the patient orally. Diarrhea improved. However diarrhea returned on 10/28. SUBJECTIVE:    Patient seen and examined in his room. Denies any chest pain, nausea, vomiting. Major overnight events. Records reviewed. Stable overnight. No other overnight issues reported. Temp (24hrs), Av °F (36.7 °C), Min:97.6 °F (36.4 °C), Max:98.3 °F (36.8 °C)    DIET: ADULT TUBE FEEDING; Gastrostomy; Standard with Fiber; Continuous; 20; No; 30; Q 6 hours; Wound Healing; via G tube with tube feeds  ADULT DIET;  Full Liquid  ADULT ORAL NUTRITION SUPPLEMENT; Breakfast, AM Snack, Lunch, PM Snack, Dinner, HS Snack; Standard High Calorie/High Protein Oral Supplement  CODE: Prior    Intake/Output Summary (Last 24 hours) at 10/29/2022 0909  Last data filed at 10/29/2022 0120  Gross per 24 hour   Intake 600 ml   Output 200 ml   Net 400 ml       OBJECTIVE:    BP (!) 157/82   Pulse 79   Temp 97.6 °F (36.4 °C) (Oral)   Resp 18   Ht 5' 3\" (1.6 m)   Wt 172 lb (78 kg)   SpO2 95%   BMI 30.47 kg/m²     General appearance: No apparent distress, appears stated age and cooperative. HEENT:  Conjunctivae/corneas clear. Neck: Supple. No jugular venous distention. Respiratory: Clear to auscultation bilaterally, normal respiratory effort  Cardiovascular: Regular rate rhythm, normal S1-S2  Abdomen: Soft, nontender, nondistended. Abdominal surgical site and PEG tube noted. Musculoskeletal: No clubbing, cyanosis, no bilateral lower extremity edema. Brisk capillary refill. Skin:  No rashes  on visible skin  Neurologic: awake, alert and following commands     Assessment:  Chest pain  Large hiatal hernia  Severe GERD  Hypertension  CKD stage III, baseline creatinine 1.3-1.5  HFpEF-EF 55%  VHD-moderate MS, mild TR, mild pulmonary hypertension  Obesity, Body mass index is 30.47 kg/m². Plan:  GS and cardiology following  incarcerated paraesophageal hernia status post laparoscopic robotic assisted paraesophageal hernia repair with gastropexy and gastrostomy tube placement on 10/24. Tube feeds started on 10/25, but had to be discontinued because of diarrhea   PPI BID, carafate  Monitor renal fxn    DISPOSITION:   Patient was stable for discharge on 10/27. Having placement issues.     Medications:  REVIEWED DAILY    Infusion Medications   Scheduled Medications    acetaminophen  650 mg Oral 3 times per day    methocarbamol  750 mg Oral 4x Daily    heparin (porcine)  5,000 Units SubCUTAneous 3 times per day    carvedilol  25 mg Oral BID WC    amLODIPine  10 mg Oral Daily    losartan  100 mg Oral Daily    sucralfate  1 g Oral 4 times per day    famotidine  10 mg Oral BID    pantoprazole (PROTONIX) 40 mg injection  40 mg IntraVENous Q12H     PRN Meds: oxyCODONE, HYDROmorphone, prochlorperazine, aluminum & magnesium hydroxide-simethicone, perflutren lipid microspheres    Labs:     Recent Labs     10/27/22  0449 10/28/22  0434   WBC 8.9 8.9   HGB 13.2 14.3   HCT 39.7 43.1    232       Recent Labs     10/27/22  0449 10/28/22  0434    142   K 3.2* 3.4*   * 104   CO2 26 25   BUN 22 19   CREATININE 1.3* 1.1   CALCIUM 8.8 9.2       No results for input(s): PROT, ALB, ALKPHOS, ALT, AST, BILITOT, AMYLASE, LIPASE in the last 72 hours. No results for input(s): INR in the last 72 hours. No results for input(s): Yaneth Gisselle in the last 72 hours. Chronic labs:    Lab Results   Component Value Date    TSH 1.490 10/06/2022    INR 1.1 09/30/2022       Radiology: REVIEWED DAILY    +++++++++++++++++++++++++++++++++++++++++++++++++  Griselda Eck, MD  [facility]ician - [address] [place], [place]  +++++++++++++++++++++++++++++++++++++++++++++++++  NOTE: This report was transcribed using voice recognition software. Every effort was made to ensure accuracy; however, inadvertent computerized transcription errors may be present.

## 2022-10-29 NOTE — PROGRESS NOTES
Hospitalist Progress Note      SYNOPSIS: Patient admitted on 10/20/2022 for     Patient was recently hospitalized discharged on . Patient presented with chest pain. CT chest showed large hiatal hernia. Upper GI series showed diffuse distention of the esophagus, severe esophageal dysmotility moderate sized hiatal hernia moderate to severe gastroesophageal reflux however examination was severely limited. EGD on  distal LA grade C esophagitis irregular Z-line at 30 cm large hiatal hernia containing fluid with gastric pinch at 40 cm fibrous food debris in the fundus normal stomach and normal duodenum. Patient presented to emergency room again on 10/20, this time with nausea vomiting chest pain sore throat over the past few days prior to her presentation. He had vomiting since he left the hospital.    He was found to have incarcerated paraesophageal hernia status post laparoscopic robotic assisted paraesophageal hernia repair with gastropexy and gastrostomy tube placement on . When he was started on tube feeds, he started having diarrhea. Surgery recommended holding tube feeds and feed the patient orally. Diarrhea improved. However diarrhea returned on 10/28. SUBJECTIVE:    Patient seen and examined in his room. Denies any chest pain, nausea, vomiting. Major overnight events. Records reviewed. Stable overnight. No other overnight issues reported. Temp (24hrs), Av °F (36.7 °C), Min:97.6 °F (36.4 °C), Max:98.3 °F (36.8 °C)    DIET: ADULT TUBE FEEDING; Gastrostomy; Standard with Fiber; Continuous; 20; No; 30; Q 6 hours; Wound Healing; via G tube with tube feeds  ADULT DIET;  Full Liquid  ADULT ORAL NUTRITION SUPPLEMENT; Breakfast, AM Snack, Lunch, PM Snack, Dinner, HS Snack; Standard High Calorie/High Protein Oral Supplement  CODE: Prior    Intake/Output Summary (Last 24 hours) at 10/29/2022 0908  Last data filed at 10/29/2022 0120  Gross per 24 hour   Intake 600 ml   Output 200 ml   Net 400 ml       OBJECTIVE:    BP (!) 157/82   Pulse 79   Temp 97.6 °F (36.4 °C) (Oral)   Resp 18   Ht 5' 3\" (1.6 m)   Wt 172 lb (78 kg)   SpO2 95%   BMI 30.47 kg/m²     General appearance: No apparent distress, appears stated age and cooperative. HEENT:  Conjunctivae/corneas clear. Neck: Supple. No jugular venous distention. Respiratory: Clear to auscultation bilaterally, normal respiratory effort  Cardiovascular: Regular rate rhythm, normal S1-S2  Abdomen: Soft, nontender, nondistended. Abdominal surgical site and PEG tube noted. Musculoskeletal: No clubbing, cyanosis, no bilateral lower extremity edema. Brisk capillary refill. Skin:  No rashes  on visible skin  Neurologic: awake, alert and following commands     Assessment:  Chest pain  Large hiatal hernia  Severe GERD  Hypertension  CKD stage III, baseline creatinine 1.3-1.5  HFpEF-EF 55%  VHD-moderate MS, mild TR, mild pulmonary hypertension  Obesity, Body mass index is 30.47 kg/m². Plan:  GS and cardiology following  incarcerated paraesophageal hernia status post laparoscopic robotic assisted paraesophageal hernia repair with gastropexy and gastrostomy tube placement on 10/24. Tube feeds started on 10/25, but had to be discontinued because of diarrhea and was stable for discharge on 10/27.   PPI BID, carafate  Reglan   Gentle IVF, once tube feeds started, discontinue IV fluids  Monitor renal fxn    DISPOSITION:   Plan to discharge on 10/27    Medications:  REVIEWED DAILY    Infusion Medications   Scheduled Medications    acetaminophen  650 mg Oral 3 times per day    methocarbamol  750 mg Oral 4x Daily    heparin (porcine)  5,000 Units SubCUTAneous 3 times per day    carvedilol  25 mg Oral BID WC    amLODIPine  10 mg Oral Daily    losartan  100 mg Oral Daily    sucralfate  1 g Oral 4 times per day    famotidine  10 mg Oral BID    pantoprazole (PROTONIX) 40 mg injection  40 mg IntraVENous Q12H     PRN Meds: oxyCODONE, HYDROmorphone, prochlorperazine, aluminum & magnesium hydroxide-simethicone, perflutren lipid microspheres    Labs:     Recent Labs     10/27/22  0449 10/28/22  0434   WBC 8.9 8.9   HGB 13.2 14.3   HCT 39.7 43.1    232       Recent Labs     10/27/22  0449 10/28/22  0434    142   K 3.2* 3.4*   * 104   CO2 26 25   BUN 22 19   CREATININE 1.3* 1.1   CALCIUM 8.8 9.2       No results for input(s): PROT, ALB, ALKPHOS, ALT, AST, BILITOT, AMYLASE, LIPASE in the last 72 hours. No results for input(s): INR in the last 72 hours. No results for input(s): Marc Valentin in the last 72 hours. Chronic labs:    Lab Results   Component Value Date    TSH 1.490 10/06/2022    INR 1.1 09/30/2022       Radiology: REVIEWED DAILY    +++++++++++++++++++++++++++++++++++++++++++++++++  Rose Mcallister MD  [facility] Physician - [address] [place] [address], [place]  +++++++++++++++++++++++++++++++++++++++++++++++++  NOTE: This report was transcribed using voice recognition software. Every effort was made to ensure accuracy; however, inadvertent computerized transcription errors may be present.

## 2022-10-30 LAB
ALBUMIN SERPL-MCNC: 3.5 G/DL (ref 3.5–5.2)
ALP BLD-CCNC: 52 U/L (ref 40–129)
ALT SERPL-CCNC: 38 U/L (ref 0–40)
ANION GAP SERPL CALCULATED.3IONS-SCNC: 12 MMOL/L (ref 7–16)
AST SERPL-CCNC: 22 U/L (ref 0–39)
BILIRUB SERPL-MCNC: 0.4 MG/DL (ref 0–1.2)
BUN BLDV-MCNC: 25 MG/DL (ref 6–23)
CALCIUM SERPL-MCNC: 9.3 MG/DL (ref 8.6–10.2)
CHLORIDE BLD-SCNC: 108 MMOL/L (ref 98–107)
CO2: 24 MMOL/L (ref 22–29)
CREAT SERPL-MCNC: 1.2 MG/DL (ref 0.7–1.2)
GFR SERPL CREATININE-BSD FRML MDRD: >60 ML/MIN/1.73
GLUCOSE BLD-MCNC: 114 MG/DL (ref 74–99)
HCT VFR BLD CALC: 43.2 % (ref 37–54)
HEMOGLOBIN: 14.3 G/DL (ref 12.5–16.5)
MCH RBC QN AUTO: 31.2 PG (ref 26–35)
MCHC RBC AUTO-ENTMCNC: 33.1 % (ref 32–34.5)
MCV RBC AUTO: 94.1 FL (ref 80–99.9)
PDW BLD-RTO: 13 FL (ref 11.5–15)
PLATELET # BLD: 243 E9/L (ref 130–450)
PMV BLD AUTO: 10.7 FL (ref 7–12)
POTASSIUM SERPL-SCNC: 3.9 MMOL/L (ref 3.5–5)
RBC # BLD: 4.59 E12/L (ref 3.8–5.8)
SODIUM BLD-SCNC: 144 MMOL/L (ref 132–146)
TOTAL PROTEIN: 6.7 G/DL (ref 6.4–8.3)
WBC # BLD: 8.5 E9/L (ref 4.5–11.5)

## 2022-10-30 PROCEDURE — 6370000000 HC RX 637 (ALT 250 FOR IP)

## 2022-10-30 PROCEDURE — 6370000000 HC RX 637 (ALT 250 FOR IP): Performed by: STUDENT IN AN ORGANIZED HEALTH CARE EDUCATION/TRAINING PROGRAM

## 2022-10-30 PROCEDURE — 2580000003 HC RX 258: Performed by: STUDENT IN AN ORGANIZED HEALTH CARE EDUCATION/TRAINING PROGRAM

## 2022-10-30 PROCEDURE — 80053 COMPREHEN METABOLIC PANEL: CPT

## 2022-10-30 PROCEDURE — 6360000002 HC RX W HCPCS: Performed by: STUDENT IN AN ORGANIZED HEALTH CARE EDUCATION/TRAINING PROGRAM

## 2022-10-30 PROCEDURE — 1200000000 HC SEMI PRIVATE

## 2022-10-30 PROCEDURE — C9113 INJ PANTOPRAZOLE SODIUM, VIA: HCPCS | Performed by: STUDENT IN AN ORGANIZED HEALTH CARE EDUCATION/TRAINING PROGRAM

## 2022-10-30 PROCEDURE — 85027 COMPLETE CBC AUTOMATED: CPT

## 2022-10-30 PROCEDURE — 36415 COLL VENOUS BLD VENIPUNCTURE: CPT

## 2022-10-30 PROCEDURE — A4216 STERILE WATER/SALINE, 10 ML: HCPCS | Performed by: STUDENT IN AN ORGANIZED HEALTH CARE EDUCATION/TRAINING PROGRAM

## 2022-10-30 RX ADMIN — SUCRALFATE 1 G: 1 TABLET ORAL at 17:38

## 2022-10-30 RX ADMIN — OXYCODONE 5 MG: 5 TABLET ORAL at 17:38

## 2022-10-30 RX ADMIN — SUCRALFATE 1 G: 1 TABLET ORAL at 12:42

## 2022-10-30 RX ADMIN — METHOCARBAMOL 750 MG: 750 TABLET ORAL at 12:42

## 2022-10-30 RX ADMIN — CARVEDILOL 25 MG: 25 TABLET, FILM COATED ORAL at 08:38

## 2022-10-30 RX ADMIN — AMLODIPINE BESYLATE 10 MG: 10 TABLET ORAL at 08:38

## 2022-10-30 RX ADMIN — METHOCARBAMOL 750 MG: 750 TABLET ORAL at 21:21

## 2022-10-30 RX ADMIN — SODIUM CHLORIDE, PRESERVATIVE FREE 40 MG: 5 INJECTION INTRAVENOUS at 00:24

## 2022-10-30 RX ADMIN — FAMOTIDINE 10 MG: 20 TABLET ORAL at 08:38

## 2022-10-30 RX ADMIN — OXYCODONE 5 MG: 5 TABLET ORAL at 02:05

## 2022-10-30 RX ADMIN — CARVEDILOL 25 MG: 25 TABLET, FILM COATED ORAL at 17:38

## 2022-10-30 RX ADMIN — ACETAMINOPHEN 650 MG: 325 TABLET, FILM COATED ORAL at 05:30

## 2022-10-30 RX ADMIN — HEPARIN SODIUM 5000 UNITS: 10000 INJECTION INTRAVENOUS; SUBCUTANEOUS at 13:59

## 2022-10-30 RX ADMIN — HEPARIN SODIUM 5000 UNITS: 10000 INJECTION INTRAVENOUS; SUBCUTANEOUS at 05:30

## 2022-10-30 RX ADMIN — ACETAMINOPHEN 650 MG: 325 TABLET, FILM COATED ORAL at 13:59

## 2022-10-30 RX ADMIN — OXYCODONE 5 MG: 5 TABLET ORAL at 08:38

## 2022-10-30 RX ADMIN — ACETAMINOPHEN 650 MG: 325 TABLET, FILM COATED ORAL at 21:21

## 2022-10-30 RX ADMIN — SODIUM CHLORIDE, PRESERVATIVE FREE 40 MG: 5 INJECTION INTRAVENOUS at 23:23

## 2022-10-30 RX ADMIN — FAMOTIDINE 10 MG: 20 TABLET ORAL at 21:21

## 2022-10-30 RX ADMIN — SUCRALFATE 1 G: 1 TABLET ORAL at 00:24

## 2022-10-30 RX ADMIN — SUCRALFATE 1 G: 1 TABLET ORAL at 23:24

## 2022-10-30 RX ADMIN — HEPARIN SODIUM 5000 UNITS: 10000 INJECTION INTRAVENOUS; SUBCUTANEOUS at 21:21

## 2022-10-30 RX ADMIN — SODIUM CHLORIDE, PRESERVATIVE FREE 40 MG: 5 INJECTION INTRAVENOUS at 12:42

## 2022-10-30 RX ADMIN — SUCRALFATE 1 G: 1 TABLET ORAL at 05:30

## 2022-10-30 RX ADMIN — OXYCODONE 5 MG: 5 TABLET ORAL at 12:42

## 2022-10-30 RX ADMIN — LOSARTAN POTASSIUM 100 MG: 50 TABLET, FILM COATED ORAL at 08:38

## 2022-10-30 RX ADMIN — METHOCARBAMOL 750 MG: 750 TABLET ORAL at 17:38

## 2022-10-30 RX ADMIN — OXYCODONE 5 MG: 5 TABLET ORAL at 23:23

## 2022-10-30 RX ADMIN — METHOCARBAMOL 750 MG: 750 TABLET ORAL at 08:38

## 2022-10-30 ASSESSMENT — PAIN DESCRIPTION - LOCATION: LOCATION: ABDOMEN

## 2022-10-30 ASSESSMENT — PAIN DESCRIPTION - PAIN TYPE: TYPE: ACUTE PAIN

## 2022-10-30 ASSESSMENT — PAIN - FUNCTIONAL ASSESSMENT: PAIN_FUNCTIONAL_ASSESSMENT: ACTIVITIES ARE NOT PREVENTED

## 2022-10-30 ASSESSMENT — PAIN SCALES - GENERAL
PAINLEVEL_OUTOF10: 5
PAINLEVEL_OUTOF10: 6
PAINLEVEL_OUTOF10: 6

## 2022-10-30 ASSESSMENT — PAIN DESCRIPTION - ORIENTATION: ORIENTATION: RIGHT;LEFT

## 2022-10-30 ASSESSMENT — PAIN DESCRIPTION - DESCRIPTORS: DESCRIPTORS: ACHING;DISCOMFORT;CRAMPING

## 2022-10-30 NOTE — PLAN OF CARE
Problem: Pain  Goal: Verbalizes/displays adequate comfort level or baseline comfort level  Outcome: Progressing     Problem: Chronic Conditions and Co-morbidities  Goal: Patient's chronic conditions and co-morbidity symptoms are monitored and maintained or improved  Outcome: Progressing     Problem: Safety - Adult  Goal: Free from fall injury  Outcome: Progressing     Problem: Nutrition Deficit:  Goal: Optimize nutritional status  Outcome: Progressing

## 2022-10-30 NOTE — PROGRESS NOTES
Hospitalist Progress Note      SYNOPSIS: Patient admitted on 10/20/2022 for     Patient was recently hospitalized discharged on . Patient presented with chest pain. CT chest showed large hiatal hernia. Upper GI series showed diffuse distention of the esophagus, severe esophageal dysmotility moderate sized hiatal hernia moderate to severe gastroesophageal reflux however examination was severely limited. EGD on  distal LA grade C esophagitis irregular Z-line at 30 cm large hiatal hernia containing fluid with gastric pinch at 40 cm fibrous food debris in the fundus normal stomach and normal duodenum. Patient presented to emergency room again on 10/20, this time with nausea vomiting chest pain sore throat over the past few days prior to her presentation. He had vomiting since he left the hospital.    He was found to have incarcerated paraesophageal hernia status post laparoscopic robotic assisted paraesophageal hernia repair with gastropexy and gastrostomy tube placement on . When he was started on tube feeds, he started having diarrhea. Surgery recommended holding tube feeds and feed the patient orally. Diarrhea improved. However diarrhea returned on 10/28. Diarrhea resolved when his stool softeners were discontinued. SUBJECTIVE:    Patient seen and examined in his room. Denies any chest pain, nausea, vomiting. Major overnight events. Complains of pain at the surgical site. Records reviewed. Stable overnight. No other overnight issues reported. Temp (24hrs), Av.7 °F (36.5 °C), Min:97 °F (36.1 °C), Max:98.4 °F (36.9 °C)    DIET: ADULT TUBE FEEDING; Gastrostomy; Standard with Fiber; Continuous; 20; No; 30; Q 6 hours; Wound Healing; via G tube with tube feeds  ADULT ORAL NUTRITION SUPPLEMENT; Breakfast, AM Snack, Lunch, PM Snack, Dinner, HS Snack; Standard High Calorie/High Protein Oral Supplement  ADULT DIET;  Regular  CODE: Prior    Intake/Output Summary (Last 24 hours) at 10/30/2022 0918  Last data filed at 10/30/2022 7067  Gross per 24 hour   Intake 360 ml   Output 900 ml   Net -540 ml       OBJECTIVE:    BP (!) 169/94   Pulse 83   Temp 98.4 °F (36.9 °C) (Oral)   Resp 18   Ht 5' 3\" (1.6 m)   Wt 172 lb (78 kg)   SpO2 93%   BMI 30.47 kg/m²     General appearance: No apparent distress, appears stated age and cooperative. HEENT:  Conjunctivae/corneas clear. Neck: Supple. No jugular venous distention. Respiratory: Clear to auscultation bilaterally, normal respiratory effort  Cardiovascular: Regular rate rhythm, normal S1-S2  Abdomen: Soft, nontender, nondistended. Abdominal surgical site and PEG tube noted. Musculoskeletal: No clubbing, cyanosis, no bilateral lower extremity edema. Brisk capillary refill. Skin:  No rashes  on visible skin  Neurologic: awake, alert and following commands     Assessment:  Chest pain  Large hiatal hernia  Severe GERD  Hypertension  CKD stage III, baseline creatinine 1.3-1.5  HFpEF-EF 55%  VHD-moderate MS, mild TR, mild pulmonary hypertension  Obesity, Body mass index is 30.47 kg/m². Plan:  GS and cardiology following  incarcerated paraesophageal hernia status post laparoscopic robotic assisted paraesophageal hernia repair with gastropexy and gastrostomy tube placement on 10/24. Tube feeds started on 10/25, but had to be discontinued because of diarrhea, diarrhea resolved  No plan to start tube feeds, patient taking optimal nutrition by mouth  PPI BID, carafate  Monitor renal fxn    DISPOSITION:   Patient was stable for discharge on 10/27. Having placement issues.     Medications:  REVIEWED DAILY    Infusion Medications   Scheduled Medications    acetaminophen  650 mg Oral 3 times per day    methocarbamol  750 mg Oral 4x Daily    heparin (porcine)  5,000 Units SubCUTAneous 3 times per day    carvedilol  25 mg Oral BID WC    amLODIPine  10 mg Oral Daily    losartan  100 mg Oral Daily    sucralfate  1 g Oral 4 times per day    famotidine  10 mg Oral BID    pantoprazole (PROTONIX) 40 mg injection  40 mg IntraVENous Q12H     PRN Meds: oxyCODONE, prochlorperazine, perflutren lipid microspheres    Labs:     Recent Labs     10/28/22  0434 10/30/22  0434   WBC 8.9 8.5   HGB 14.3 14.3   HCT 43.1 43.2    243       Recent Labs     10/28/22  0434 10/30/22  0434    144   K 3.4* 3.9    108*   CO2 25 24   BUN 19 25*   CREATININE 1.1 1.2   CALCIUM 9.2 9.3       Recent Labs     10/30/22  0434   PROT 6.7   ALKPHOS 52   ALT 38   AST 22   BILITOT 0.4       No results for input(s): INR in the last 72 hours. No results for input(s): [place] in the last 72 hours. Chronic labs:    Lab Results   Component Value Date    TSH 1.490 10/06/2022    INR 1.1 09/30/2022       Radiology: REVIEWED DAILY    +++++++++++++++++++++++++++++++++++++++++++++++++  Janeth Tubbs MD  [facility] Physician - [address] [place] [address]  +++++++++++++++++++++++++++++++++++++++++++++++++  NOTE: This report was transcribed using voice recognition software. Every effort was made to ensure accuracy; however, inadvertent computerized transcription errors may be present.

## 2022-10-31 LAB
ANION GAP SERPL CALCULATED.3IONS-SCNC: 13 MMOL/L (ref 7–16)
BUN BLDV-MCNC: 24 MG/DL (ref 6–23)
CALCIUM SERPL-MCNC: 9.3 MG/DL (ref 8.6–10.2)
CHLORIDE BLD-SCNC: 107 MMOL/L (ref 98–107)
CO2: 22 MMOL/L (ref 22–29)
CREAT SERPL-MCNC: 1.2 MG/DL (ref 0.7–1.2)
GFR SERPL CREATININE-BSD FRML MDRD: >60 ML/MIN/1.73
GLUCOSE BLD-MCNC: 117 MG/DL (ref 74–99)
HCT VFR BLD CALC: 44.1 % (ref 37–54)
HEMOGLOBIN: 14.5 G/DL (ref 12.5–16.5)
MCH RBC QN AUTO: 30.4 PG (ref 26–35)
MCHC RBC AUTO-ENTMCNC: 32.9 % (ref 32–34.5)
MCV RBC AUTO: 92.5 FL (ref 80–99.9)
PDW BLD-RTO: 12.8 FL (ref 11.5–15)
PLATELET # BLD: 246 E9/L (ref 130–450)
PMV BLD AUTO: 11 FL (ref 7–12)
POTASSIUM SERPL-SCNC: 3.8 MMOL/L (ref 3.5–5)
RBC # BLD: 4.77 E12/L (ref 3.8–5.8)
SODIUM BLD-SCNC: 142 MMOL/L (ref 132–146)
WBC # BLD: 8.5 E9/L (ref 4.5–11.5)

## 2022-10-31 PROCEDURE — 6370000000 HC RX 637 (ALT 250 FOR IP): Performed by: STUDENT IN AN ORGANIZED HEALTH CARE EDUCATION/TRAINING PROGRAM

## 2022-10-31 PROCEDURE — 6370000000 HC RX 637 (ALT 250 FOR IP)

## 2022-10-31 PROCEDURE — 80048 BASIC METABOLIC PNL TOTAL CA: CPT

## 2022-10-31 PROCEDURE — 85027 COMPLETE CBC AUTOMATED: CPT

## 2022-10-31 PROCEDURE — 6360000002 HC RX W HCPCS: Performed by: STUDENT IN AN ORGANIZED HEALTH CARE EDUCATION/TRAINING PROGRAM

## 2022-10-31 PROCEDURE — A4216 STERILE WATER/SALINE, 10 ML: HCPCS | Performed by: STUDENT IN AN ORGANIZED HEALTH CARE EDUCATION/TRAINING PROGRAM

## 2022-10-31 PROCEDURE — 2580000003 HC RX 258: Performed by: STUDENT IN AN ORGANIZED HEALTH CARE EDUCATION/TRAINING PROGRAM

## 2022-10-31 PROCEDURE — 36415 COLL VENOUS BLD VENIPUNCTURE: CPT

## 2022-10-31 PROCEDURE — 1200000000 HC SEMI PRIVATE

## 2022-10-31 PROCEDURE — C9113 INJ PANTOPRAZOLE SODIUM, VIA: HCPCS | Performed by: STUDENT IN AN ORGANIZED HEALTH CARE EDUCATION/TRAINING PROGRAM

## 2022-10-31 RX ADMIN — FAMOTIDINE 10 MG: 20 TABLET ORAL at 08:27

## 2022-10-31 RX ADMIN — CARVEDILOL 25 MG: 25 TABLET, FILM COATED ORAL at 08:27

## 2022-10-31 RX ADMIN — OXYCODONE 5 MG: 5 TABLET ORAL at 08:27

## 2022-10-31 RX ADMIN — OXYCODONE 5 MG: 5 TABLET ORAL at 23:24

## 2022-10-31 RX ADMIN — METHOCARBAMOL 750 MG: 750 TABLET ORAL at 21:51

## 2022-10-31 RX ADMIN — OXYCODONE 5 MG: 5 TABLET ORAL at 03:49

## 2022-10-31 RX ADMIN — OXYCODONE 5 MG: 5 TABLET ORAL at 16:50

## 2022-10-31 RX ADMIN — METHOCARBAMOL 750 MG: 750 TABLET ORAL at 12:43

## 2022-10-31 RX ADMIN — SUCRALFATE 1 G: 1 TABLET ORAL at 11:39

## 2022-10-31 RX ADMIN — SUCRALFATE 1 G: 1 TABLET ORAL at 16:50

## 2022-10-31 RX ADMIN — CARVEDILOL 25 MG: 25 TABLET, FILM COATED ORAL at 16:50

## 2022-10-31 RX ADMIN — FAMOTIDINE 10 MG: 20 TABLET ORAL at 21:51

## 2022-10-31 RX ADMIN — ACETAMINOPHEN 650 MG: 325 TABLET, FILM COATED ORAL at 12:42

## 2022-10-31 RX ADMIN — ACETAMINOPHEN 650 MG: 325 TABLET, FILM COATED ORAL at 05:29

## 2022-10-31 RX ADMIN — PROCHLORPERAZINE EDISYLATE 10 MG: 5 INJECTION INTRAMUSCULAR; INTRAVENOUS at 16:50

## 2022-10-31 RX ADMIN — METHOCARBAMOL 750 MG: 750 TABLET ORAL at 16:51

## 2022-10-31 RX ADMIN — SUCRALFATE 1 G: 1 TABLET ORAL at 05:29

## 2022-10-31 RX ADMIN — HEPARIN SODIUM 5000 UNITS: 10000 INJECTION INTRAVENOUS; SUBCUTANEOUS at 05:29

## 2022-10-31 RX ADMIN — LOSARTAN POTASSIUM 100 MG: 50 TABLET, FILM COATED ORAL at 08:27

## 2022-10-31 RX ADMIN — AMLODIPINE BESYLATE 10 MG: 10 TABLET ORAL at 08:27

## 2022-10-31 RX ADMIN — METHOCARBAMOL 750 MG: 750 TABLET ORAL at 08:27

## 2022-10-31 RX ADMIN — HEPARIN SODIUM 5000 UNITS: 10000 INJECTION INTRAVENOUS; SUBCUTANEOUS at 21:50

## 2022-10-31 RX ADMIN — SODIUM CHLORIDE, PRESERVATIVE FREE 40 MG: 5 INJECTION INTRAVENOUS at 11:38

## 2022-10-31 RX ADMIN — ACETAMINOPHEN 650 MG: 325 TABLET, FILM COATED ORAL at 21:50

## 2022-10-31 ASSESSMENT — PAIN SCALES - GENERAL
PAINLEVEL_OUTOF10: 8
PAINLEVEL_OUTOF10: 4
PAINLEVEL_OUTOF10: 6

## 2022-10-31 ASSESSMENT — PAIN DESCRIPTION - LOCATION: LOCATION: ABDOMEN

## 2022-10-31 ASSESSMENT — PAIN DESCRIPTION - DESCRIPTORS: DESCRIPTORS: STABBING

## 2022-10-31 NOTE — PROGRESS NOTES
Hospitalist Progress Note      SYNOPSIS: Patient admitted on 10/20/2022 for     Patient was recently hospitalized discharged on . Patient presented with chest pain. CT chest showed large hiatal hernia. Upper GI series showed diffuse distention of the esophagus, severe esophageal dysmotility moderate sized hiatal hernia moderate to severe gastroesophageal reflux however examination was severely limited. EGD on  distal LA grade C esophagitis irregular Z-line at 30 cm large hiatal hernia containing fluid with gastric pinch at 40 cm fibrous food debris in the fundus normal stomach and normal duodenum. Patient presented to emergency room again on 10/20, this time with nausea vomiting chest pain sore throat over the past few days prior to her presentation. He had vomiting since he left the hospital.    He was found to have incarcerated paraesophageal hernia status post laparoscopic robotic assisted paraesophageal hernia repair with gastropexy and gastrostomy tube placement on . When he was started on tube feeds, he started having diarrhea. Surgery recommended holding tube feeds and feed the patient orally. Diarrhea improved. However diarrhea returned on 10/28. Diarrhea resolved when his stool softeners were discontinued. SUBJECTIVE:    Patient seen and examined in his room. Denies any chest pain, nausea, vomiting. Appears comfortable. Records reviewed. Stable overnight. No other overnight issues reported. Temp (24hrs), Av.9 °F (36.6 °C), Min:97.4 °F (36.3 °C), Max:98.4 °F (36.9 °C)    DIET: ADULT ORAL NUTRITION SUPPLEMENT; Breakfast, AM Snack, Lunch, PM Snack, Dinner, HS Snack; Standard High Calorie/High Protein Oral Supplement  ADULT DIET;  Regular  CODE: Prior    Intake/Output Summary (Last 24 hours) at 10/31/2022 0818  Last data filed at 10/31/2022 0532  Gross per 24 hour   Intake 960 ml   Output 500 ml   Net 460 ml       OBJECTIVE:    BP (!) 158/96 microspheres    Labs:     Recent Labs     10/30/22  0434 10/31/22  0427   WBC 8.5 8.5   HGB 14.3 14.5   HCT 43.2 44.1    246       Recent Labs     10/30/22  0434 10/31/22  0427    142   K 3.9 3.8   * 107   CO2 24 22   BUN 25* 24*   CREATININE 1.2 1.2   CALCIUM 9.3 9.3       Recent Labs     10/30/22  0434   PROT 6.7   ALKPHOS 52   ALT 38   AST 22   BILITOT 0.4       No results for input(s): INR in the last 72 hours. No results for input(s): [place] in the last 72 hours. Chronic labs:    Lab Results   Component Value Date    TSH 1.490 10/06/2022    INR 1.1 09/30/2022       Radiology: REVIEWED DAILY    +++++++++++++++++++++++++++++++++++++++++++++++++  Patrick Vidales MD  [facility] Physician - [address], [place]  +++++++++++++++++++++++++++++++++++++++++++++++++  NOTE: This report was transcribed using voice recognition software. Every effort was made to ensure accuracy; however, inadvertent computerized transcription errors may be present.

## 2022-10-31 NOTE — PROGRESS NOTES
Comprehensive Nutrition Assessment    Type and Reason for Visit:  Reassess    Nutrition Recommendations/Plan:   Continue current diet w/ ONS as tolerated. No further nutrition intervention indicated at this time. Will continue to monitor. Consult RD as needed. Malnutrition Assessment:  Malnutrition Status: At risk for malnutrition (Comment) (10/24/22 1857)    Context:  Acute Illness     Findings of the 6 clinical characteristics of malnutrition:  Energy Intake:  Mild decrease in energy intake (Comment) (since admission)  Weight Loss:  Unable to assess (d/t lack of actual weight history)     Body Fat Loss:  Unable to assess (d/t pt off floor at this time)     Muscle Mass Loss:  Unable to assess (d/t pt off floor at this time)    Fluid Accumulation:  No significant fluid accumulation     Strength:  Not Performed    Nutrition Assessment:    Pt. PO intake currently stable-consuming % of meals. Pt. is s/p laparoscopic robotic assisted paraesophageal hernia repair with gastropexy and gastrostomy tube placement on 9/24. Tube feeds started on 10/25, but discontinued 2/2 diarrhea(resolved). No plan to start tube feeds, patient taking optimal nutrition by mouth. Noted N/V and chest pain PTA ; s/p EGD on 10/13 showing diffuse distention of the esophagus/severe esophageal dysmotility/moderate sized hiatal hernia/moderate to severe gastroesophageal reflux ; hx of CVA and dysphagia ; hx of CKD and CHF ; Continue ONS and monitor. Nutrition Related Findings:    -I&Os, elevated BUN, no edema, A&O x 4, missing teeth, active BS, tenderness to abd, PEG LUQ clamped Wound Type: Surgical Incision (Incision x 1)       Current Nutrition Intake & Therapies:    Average Meal Intake: % (most meals)  Average Supplements Intake: Unable to assess  ADULT DIET; Regular  ADULT ORAL NUTRITION SUPPLEMENT; Breakfast, Lunch;  Low Calorie/High Protein Oral Supplement    Anthropometric Measures:  Height: 5' 3\" (160 cm)  Ideal Body Weight (IBW): 124 lbs (56 kg)       Current Body Weight: 172 lb (78 kg) (10/20, actual), 138.7 % IBW. Current BMI (kg/m2): 30.5  Usual Body Weight:  (UTO ; no actual weights available in EMR hx from previous encounters ; EMR shows past weight of 172# no method on 10/6/22)                       BMI Categories: Obese Class 1 (BMI 30.0-34. 9)    Estimated Daily Nutrient Needs:  Energy Requirements Based On: Formula  Weight Used for Energy Requirements: Current  Energy (kcal/day): 7556-5917 (REE 1427 x 1.2 SF)  Weight Used for Protein Requirements: Ideal  Protein (g/day): 70-85 (1.3-1.5g/kg IBW)  Method Used for Fluid Requirements: 1 ml/kcal  Fluid (ml/day): 0160-1691    Nutrition Diagnosis:   Inadequate oral intake (improved) related to swallowing difficulty (2/2 severe esophageal dysmotility) as evidenced by poor intake prior to admission, GI abnormality    Nutrition Interventions:   Food and/or Nutrient Delivery: Continue Current Diet, Continue Oral Nutrition Supplement (Ensure HP BID)  Nutrition Education/Counseling: Education not indicated  Coordination of Nutrition Care: Continue to monitor while inpatient       Goals:  Previous Goal Met: Goal(s) Achieved  Goals: PO intake 75% or greater, by next RD assessment (to continue)       Nutrition Monitoring and Evaluation:   Behavioral-Environmental Outcomes: None Identified  Food/Nutrient Intake Outcomes: Food and Nutrient Intake, Supplement Intake  Physical Signs/Symptoms Outcomes: Biochemical Data, Chewing or Swallowing, GI Status, Fluid Status or Edema, Hemodynamic Status, Nutrition Focused Physical Findings, Skin, Weight, Nausea or Vomiting    Discharge Planning:    No discharge needs at this time     Celsa Lui RD  Contact: ext [phone]

## 2022-10-31 NOTE — CARE COORDINATION
Social Work/Case Management Transition of Care Planning (Ajay Murphy [place] [phone]):   Updated PT/OT notes and note from general surgery regarding g tube use/care were faxed to [facility]. Phone call to [facility] in admissions. She indicated she will review with DON and update SW later in the day. CM/SW will follow. JOURDAN Rmoe  10/31/2022    Update:  Per [facility], they will accept patient. Level of care with PAS/RR will be needed. Phone call to patient's , Officer Brodie Burger. He is in agreement with plan for patient to discharge to [facility]. He requested notification day of discharge. ([phone])  PAS/RR completed and faxed with LOC. Await response.   JOURDAN Rome  10/31/2022

## 2022-11-01 VITALS
HEIGHT: 63 IN | HEART RATE: 72 BPM | DIASTOLIC BLOOD PRESSURE: 79 MMHG | WEIGHT: 172 LBS | SYSTOLIC BLOOD PRESSURE: 125 MMHG | TEMPERATURE: 97.9 F | BODY MASS INDEX: 30.48 KG/M2 | RESPIRATION RATE: 18 BRPM | OXYGEN SATURATION: 93 %

## 2022-11-01 LAB — SARS-COV-2, NAAT: NOT DETECTED

## 2022-11-01 PROCEDURE — 6370000000 HC RX 637 (ALT 250 FOR IP): Performed by: STUDENT IN AN ORGANIZED HEALTH CARE EDUCATION/TRAINING PROGRAM

## 2022-11-01 PROCEDURE — 6360000002 HC RX W HCPCS: Performed by: STUDENT IN AN ORGANIZED HEALTH CARE EDUCATION/TRAINING PROGRAM

## 2022-11-01 PROCEDURE — 87635 SARS-COV-2 COVID-19 AMP PRB: CPT

## 2022-11-01 PROCEDURE — 6370000000 HC RX 637 (ALT 250 FOR IP)

## 2022-11-01 PROCEDURE — A4216 STERILE WATER/SALINE, 10 ML: HCPCS | Performed by: STUDENT IN AN ORGANIZED HEALTH CARE EDUCATION/TRAINING PROGRAM

## 2022-11-01 PROCEDURE — C9113 INJ PANTOPRAZOLE SODIUM, VIA: HCPCS | Performed by: STUDENT IN AN ORGANIZED HEALTH CARE EDUCATION/TRAINING PROGRAM

## 2022-11-01 PROCEDURE — 2580000003 HC RX 258: Performed by: STUDENT IN AN ORGANIZED HEALTH CARE EDUCATION/TRAINING PROGRAM

## 2022-11-01 RX ADMIN — SODIUM CHLORIDE, PRESERVATIVE FREE 40 MG: 5 INJECTION INTRAVENOUS at 11:38

## 2022-11-01 RX ADMIN — CARVEDILOL 25 MG: 25 TABLET, FILM COATED ORAL at 09:54

## 2022-11-01 RX ADMIN — AMLODIPINE BESYLATE 10 MG: 10 TABLET ORAL at 09:55

## 2022-11-01 RX ADMIN — METHOCARBAMOL 750 MG: 750 TABLET ORAL at 09:55

## 2022-11-01 RX ADMIN — HEPARIN SODIUM 5000 UNITS: 10000 INJECTION INTRAVENOUS; SUBCUTANEOUS at 06:03

## 2022-11-01 RX ADMIN — ACETAMINOPHEN 650 MG: 325 TABLET, FILM COATED ORAL at 06:03

## 2022-11-01 RX ADMIN — METHOCARBAMOL 750 MG: 750 TABLET ORAL at 13:00

## 2022-11-01 RX ADMIN — FAMOTIDINE 10 MG: 20 TABLET ORAL at 09:55

## 2022-11-01 RX ADMIN — LOSARTAN POTASSIUM 100 MG: 50 TABLET, FILM COATED ORAL at 09:55

## 2022-11-01 RX ADMIN — ACETAMINOPHEN 650 MG: 325 TABLET, FILM COATED ORAL at 13:49

## 2022-11-01 RX ADMIN — SUCRALFATE 1 G: 1 TABLET ORAL at 11:36

## 2022-11-01 RX ADMIN — SUCRALFATE 1 G: 1 TABLET ORAL at 06:03

## 2022-11-01 RX ADMIN — HEPARIN SODIUM 5000 UNITS: 10000 INJECTION INTRAVENOUS; SUBCUTANEOUS at 13:50

## 2022-11-01 ASSESSMENT — PAIN DESCRIPTION - LOCATION: LOCATION: ABDOMEN

## 2022-11-01 ASSESSMENT — PAIN SCALES - GENERAL
PAINLEVEL_OUTOF10: 7
PAINLEVEL_OUTOF10: 1
PAINLEVEL_OUTOF10: 7
PAINLEVEL_OUTOF10: 0

## 2022-11-01 ASSESSMENT — PAIN DESCRIPTION - DESCRIPTORS: DESCRIPTORS: STABBING

## 2022-11-01 ASSESSMENT — PAIN - FUNCTIONAL ASSESSMENT: PAIN_FUNCTIONAL_ASSESSMENT: ACTIVITIES ARE NOT PREVENTED

## 2022-11-01 ASSESSMENT — PAIN DESCRIPTION - FREQUENCY: FREQUENCY: INTERMITTENT

## 2022-11-01 ASSESSMENT — PAIN DESCRIPTION - ORIENTATION: ORIENTATION: RIGHT;LEFT

## 2022-11-01 ASSESSMENT — PAIN DESCRIPTION - PAIN TYPE: TYPE: ACUTE PAIN

## 2022-11-01 NOTE — DISCHARGE SUMMARY
Hospitalist Discharge Summary    Patient ID: Farrah Martinez   Patient : 1950  Patient's PCP: No primary care provider on file. Admit Date: 10/20/2022   Admitting Physician: Lynn Shaver MD    Discharge Date:  2022   Discharge Physician: Royer Lara MD   Discharge Condition: Stable  Discharge Disposition: [facility] course in brief:  (Please refer to daily progress notes for a comprehensive review of the hospitalization by requesting medical records)      Patient was recently hospitalized discharged on . Patient presented with chest pain. CT chest showed large hiatal hernia. Upper GI series showed diffuse distention of the esophagus, severe esophageal dysmotility moderate sized hiatal hernia moderate to severe gastroesophageal reflux however examination was severely limited. EGD on  distal LA grade C esophagitis irregular Z-line at 30 cm large hiatal hernia containing fluid with gastric pinch at 40 cm fibrous food debris in the fundus normal stomach and normal duodenum. Patient presented to emergency room again on 10/20, this time with nausea vomiting chest pain sore throat over the past few days prior to her presentation. He had vomiting since he left the hospital.    He was found to have incarcerated paraesophageal hernia status post laparoscopic robotic assisted paraesophageal hernia repair with gastropexy and gastrostomy tube placement on . When he was started on tube feeds, he started having diarrhea. Surgery recommended holding tube feeds and feed the patient orally. Diarrhea improved. However diarrhea returned on 10/28. Diarrhea resolved when his stool softeners were discontinued. As patient is having optimal oral nutrition, no plan for tube feeds. As per general surgery, PEG tube was mostly for his GI tract issues.        Consults:   IP CONSULT TO GENERAL SURGERY  IP CONSULT TO CARDIOLOGY    Discharge Diagnoses:    Assessment:  Chest pain  Large hiatal hernia  Severe GERD  Hypertension  CKD stage III, baseline creatinine 1.3-1.5  HFpEF-EF 55%  VHD-moderate MS, mild TR, mild pulmonary hypertension  Obesity, Body mass index is 30.47 kg/m². Plan:  GS and cardiology following  incarcerated paraesophageal hernia status post laparoscopic robotic assisted paraesophageal hernia repair with gastropexy and gastrostomy tube placement on 10/24. Tube feeds started on 10/25, but had to be discontinued because of diarrhea, diarrhea resolved  No plan to start tube feeds, patient taking optimal nutrition by mouth  PPI BID, carafate  Monitor renal fxn    Discharge Instructions / Follow up:    Future Appointments   Date Time Provider Ernestina Jackie   11/9/2022 11:45 AM Bessy Randhawa MD [facility]       Continued appropriate risk factor modification of blood pressure, diabetes and serum lipids will remain essential to reducing risk of future atherosclerotic development    Activity: activity as tolerated    Significant labs:  CBC:   Recent Labs     10/30/22  0434 10/31/22  0427   WBC 8.5 8.5   RBC 4.59 4.77   HGB 14.3 14.5   HCT 43.2 44.1   MCV 94.1 92.5   RDW 13.0 12.8    246     BMP:   Recent Labs     10/30/22  0434 10/31/22  0427    142   K 3.9 3.8   * 107   CO2 24 22   BUN 25* 24*   CREATININE 1.2 1.2     LFT:  Recent Labs     10/30/22  0434   PROT 6.7   ALKPHOS 52   ALT 38   AST 22   BILITOT 0.4     PT/INR: No results for input(s): INR, APTT in the last 72 hours. BNP: No results for input(s): BNP in the last 72 hours.   Hgb A1C: No results found for: LABA1C  Folate and B12: No results found for: [id number], No results found for: FOLATE  Thyroid Studies:   Lab Results   Component Value Date    TSH 1.490 10/06/2022       Urinalysis:    Lab Results   Component Value Date/Time    NITRU Negative 10/06/2022 09:34 AM    WBCUA NONE 10/06/2022 09:34 AM    BACTERIA NONE SEEN 10/06/2022 09:34 AM    RBCUA NONE 10/06/2022 09:34 AM    BLOODU Negative 10/06/2022 09:34 AM    SPECGRAV >=1.030 10/06/2022 09:34 AM    GLUCOSEU Negative 10/06/2022 09:34 AM       Imaging:  XR CHEST (2 VW)    Result Date: 10/9/2022  EXAMINATION: TWO XRAY VIEWS OF THE CHEST 10/9/2022 8:12 am COMPARISON: October 6, 2022 HISTORY: ORDERING SYSTEM PROVIDED HISTORY: pneumomnia eval TECHNOLOGIST PROVIDED HISTORY: Reason for exam:->pneumomnia eval What reading provider will be dictating this exam?->CRC FINDINGS: No airspace opacity or pleural effusion. The heart is normal size. No pneumothorax. No free air beneath the hemidiaphragms. Large hiatal hernia. No pneumonia or pleural effusion. CT ABDOMEN PELVIS W IV CONTRAST Additional Contrast? None    Result Date: 10/20/2022  EXAMINATION: CT OF THE ABDOMEN AND PELVIS WITH CONTRAST 10/20/2022 4:05 am TECHNIQUE: CT of the abdomen and pelvis was performed with the administration of intravenous contrast. Multiplanar reformatted images are provided for review. Automated exposure control, iterative reconstruction, and/or weight based adjustment of the mA/kV was utilized to reduce the radiation dose to as low as reasonably achievable. COMPARISON: None. HISTORY: ORDERING SYSTEM PROVIDED HISTORY: abdominal pain TECHNOLOGIST PROVIDED HISTORY: Reason for exam:->abdominal pain Additional Contrast?->None Decision Support Exception - unselect if not a suspected or confirmed emergency medical condition->Emergency Medical Condition (MA) What reading provider will be dictating this exam?->CRC FINDINGS: Lower Chest: Moderate retrocardiac hiatal hernia. Mild basilar atelectasis. No pleural or pericardial effusions. Organs: Liver, biliary tree, bilateral adrenal glands, bilateral kidneys, spleen and pancreas are normal. GI/Bowel: Mild descending and sigmoid colonic diverticulosis. No diverticulitis. Appendical Oth within the otherwise normal appendix. No bowel inflammatory process detected.   No ileus or obstruction. Pelvis: No free fluid or adenopathy. Peritoneum/Retroperitoneum: Aortic atherosclerosis without aneurysm. No acute features. Bones/Soft Tissues: Mild lumbar spondylosis. 1. No acute disease. RECOMMENDATIONS: Careful clinical correlation and follow up recommended. FL UGI W KUB    Result Date: 10/12/2022  EXAMINATION: DOUBLE CONTRAST UPPER GI SERIES 10/11/2022 TECHNIQUE: Double contrast upper GI series was performed with barium and air contrast. FLUOROSCOPY DOSE AND TYPE OR TIME AND EXPOSURES: Fluoroscopy time 2.8 minutes, Air Kerma 141 mGy. 33 fluoroscopic/radiographic images saved in PACS. COMPARISON: None. Correlated with CTA of the chest from October 6, 2022. HISTORY: ORDERING SYSTEM PROVIDED HISTORY: hiatal hernia dysphagia TECHNOLOGIST PROVIDED HISTORY: Reason for exam:->hiatal hernia dysphagia What reading provider will be dictating this exam?->CRC FINDINGS: Please note that the examination is significantly limited, as the patient is unable to stand. This led to limited amount of contrast ingestion. Positioning other than standard AP view was also significantly limited. There is diffuse esophageal dilation with a moderate-sized hiatal hernia. Severe tertiary esophageal contractions/esophageal dysmotility was noted. Moderate to severe gastroesophageal reflux was noted. There is no obvious mucosal abnormality involving the stomach. No obstructing lesion is appreciated. Severely limited examination with diffuse distension of the esophagus, severe esophageal dysmotility and a moderate-sized hiatal hernia. Moderate to severe gastroesophageal reflux is also present.      XR CHEST PORTABLE    Result Date: 10/20/2022  EXAMINATION: ONE XRAY VIEW OF THE CHEST 10/20/2022 3:29 am COMPARISON: October 9, 2022 HISTORY: ORDERING SYSTEM PROVIDED HISTORY: chest pain TECHNOLOGIST PROVIDED HISTORY: Reason for exam:->chest pain What reading provider will be dictating this exam?->CRC FINDINGS: Shallow inspiration. Increased left basilar opacity suggesting atelectasis or other airspace disease. This is newly developed in comparison to the prior examination referenced above. A small left effusion cannot be excluded. The right lung is clear. 1. Left basilar opacity suggesting atelectasis or other airspace disease. RECOMMENDATION: Careful clinical correlation and follow up recommended. XR CHEST PORTABLE    Result Date: 10/6/2022  EXAMINATION: ONE XRAY VIEW OF THE CHEST 10/6/2022 12:46 pm COMPARISON: 09/30/2022 HISTORY: ORDERING SYSTEM PROVIDED HISTORY: pain  hx pneumonia TECHNOLOGIST PROVIDED HISTORY: Reason for exam:->pain  hx pneumonia FINDINGS: The cardiac silhouette is at upper limits of normal in size. There are no findings of CHF. The right lung is clear. There is patchy left perihilar airspace disease. There is no pleural effusion     1. Patchy left perihilar left lower lobe airspace disease. CTA PULMONARY W CONTRAST    Result Date: 10/6/2022  EXAMINATION: CTA OF THE CHEST 10/6/2022 5:12 pm TECHNIQUE: CTA of the chest was performed after the administration of intravenous contrast.  Multiplanar reformatted images are provided for review. MIP images are provided for review. Automated exposure control, iterative reconstruction, and/or weight based adjustment of the mA/kV was utilized to reduce the radiation dose to as low as reasonably achievable. COMPARISON: None. HISTORY: ORDERING SYSTEM PROVIDED HISTORY: ro pe TECHNOLOGIST PROVIDED HISTORY: Reason for exam:->ro pe Decision Support Exception - unselect if not a suspected or confirmed emergency medical condition->Emergency Medical Condition (MA) What reading provider will be dictating this exam?->CRC FINDINGS: No filling defect in the pulmonary arteries to suggest pulmonary arterial embolism. The heart appears normal in size. No pericardial effusion. Calcifications are associated with the aortic valve. No airspace opacity or pleural effusion. No pneumothorax. Large hiatal hernia. View of the upper abdomen show severe calcified and noncalcified plaque involving the abdominal aorta. 1. No evidence of pulmonary embolism or acute pulmonary abnormality. 2. Large hiatal hernia. 3. View of the upper abdomen show severe calcified and noncalcified atherosclerotic plaque involving the abdominal aorta. NM Cardiac Stress Test Nuclear Imaging    Result Date: 10/8/2022  Indication:  Chest Pain Clinical History:   Patient has no known history of coronary artery disease. Procedure:  Pharmacologic stress testing was performed with Regadenoson 0.4 mg for 15 seconds. IMAGING: Myocardial perfusion imaging was performed at rest 30-35 minutes following the intravenous injection of 13.1 mCi of (Tc-Sestamibi) followed by 10 ml of Normal Saline. As per infusion protocol, the patient was injected intravenously with 39.7 mCi of (Tc-Sestamibi) followed by 10 ml of Normal Saline. Gated post-stress tomographic imaging was performed 20-25 minutes after stress. FINDINGS: The overall quality of the study was good. Left ventricular cavity size was noted to be normal. Rotational analog analysis demonstrated no patient motion, extracardiac activity or attenuation artifact. The gated SPECT stress imaging in the short, vertical long, and horizontal long axis demonstrated normal homogeneous tracer distribution throughout the myocardium. The resting images show no change. Gated SPECT left ventricular ejection fraction was calculated to be 69% with normal wall motion and thickening. TID ratio 1.21 Low-dose noncontrast chest CT used for attenuation correction showed extensive coronary artery calcification suggestive severe plaque burden. Large hiatal hernia was noted. 1.  ECG during the infusion did not change. 2.  The myocardial perfusion imaging was normal without ischemia or infarct.  3.  Overall left ventricular systolic function was normal without regional wall motion abnormalities. 4.  No transient ischemic dilatation. 5.  Severe plaque burden based on noncontrast chest CT used for attenuation correction 6. Low risk general pharmacologic stress test. Thank you for sending your patient to this [employer]. Rose Darling MD, [facility] cardiology. Discharge Medications:      Medication List        START taking these medications      amLODIPine 10 MG tablet  Commonly known as: NORVASC  Take 1 tablet by mouth daily     carvedilol 25 MG tablet  Commonly known as: COREG  Take 1 tablet by mouth 2 times daily (with meals)            CONTINUE taking these medications      docusate sodium 100 MG capsule  Commonly known as: COLACE     loperamide 2 MG capsule  Commonly known as: IMODIUM     losartan 25 MG tablet  Commonly known as: COZAAR  Take 1 tablet by mouth daily     pantoprazole 40 MG tablet  Commonly known as: PROTONIX  Take 1 tablet by mouth 2 times daily (before meals)     sucralfate 1 GM tablet  Commonly known as: CARAFATE  Take 1 tablet by mouth 4 times daily               Where to Get Your Medications        You can get these medications from any pharmacy    Bring a paper prescription for each of these medications  amLODIPine 10 MG tablet  carvedilol 25 MG tablet         Time Spent on discharge is more than 45 minutes in the examination, evaluation, counseling and review of medications and discharge plan.    +++++++++++++++++++++++++++++++++++++++++++++++++  Michel Means MD  [address]  +++++++++++++++++++++++++++++++++++++++++++++++++  NOTE: This report was transcribed using voice recognition software. Every effort was made to ensure accuracy; however, inadvertent computerized transcription errors may be present.

## 2022-11-01 NOTE — PROGRESS NOTES
SURGERY NOTE  Patient was placed onto a regular diet. .  Per our last notes he is to remain on a full liquid diet for two weeks post-op. The diet should not be advanced until seen in follow up.     Alexandro Zimmerman, DO  Surgery Resident PGY-5  11/1/2022  10:31 AM

## 2022-11-01 NOTE — PLAN OF CARE
Problem: Discharge Planning  Goal: Discharge to home or other facility with appropriate resources  11/1/2022 1515 by Cuco Betancourt RN  Outcome: Adequate for Discharge  11/1/2022 1514 by Cuco Betancourt RN  Outcome: Progressing  Flowsheets (Taken 11/1/2022 9911)  Discharge to home or other facility with appropriate resources: Identify barriers to discharge with patient and caregiver     Problem: Pain  Goal: Verbalizes/displays adequate comfort level or baseline comfort level  11/1/2022 1515 by Cuco Betancourt RN  Outcome: Adequate for Discharge  11/1/2022 1514 by Cuco Betancourt RN  Outcome: Progressing     Problem: Chronic Conditions and Co-morbidities  Goal: Patient's chronic conditions and co-morbidity symptoms are monitored and maintained or improved  11/1/2022 1515 by Cuco Betancourt RN  Outcome: Adequate for Discharge  11/1/2022 1514 by Cuco Betancourt RN  Outcome: Progressing  [address] (Taken 11/1/2022 0828)  Care Plan - Patient's Chronic Conditions and Co-Morbidity Symptoms are Monitored and Maintained or Improved: Monitor and assess patient's chronic conditions and comorbid symptoms for stability, deterioration, or improvement     Problem: Safety - Adult  Goal: Free from fall injury  11/1/2022 1515 by Cuco Betancourt RN  Outcome: Adequate for Discharge  11/1/2022 1514 by Cuco Betancourt RN  Outcome: Progressing  Flowsheets (Taken 11/1/2022 0828)  Free From Fall Injury: Instruct family/caregiver on patient safety     Problem: Skin/Tissue Integrity  Goal: Absence of new skin breakdown  Description: 1. Monitor for areas of redness and/or skin breakdown  2. Assess vascular access sites hourly  3. Every 4-6 hours minimum:  Change oxygen saturation probe site  4. Every 4-6 hours:  If on nasal continuous positive airway pressure, respiratory therapy assess nares and determine need for appliance change or resting period.   11/1/2022 1515 by Cuco Betancourt RN  Outcome: Adequate for Discharge  11/1/2022 1514 by Cuco Betancourt RN  Outcome: Progressing     Problem: Nutrition Deficit:  Goal: Optimize nutritional status  11/1/2022 1515 by Nancy Berrios RN  Outcome: Adequate for Discharge  11/1/2022 1514 by Nancy Berrios RN  Outcome: Progressing

## 2022-11-01 NOTE — PROGRESS NOTES
Patient is planned to discharge at 3:30 p.m. Spoke with Apolonia Marx at [facility] ([facility]) to provide report on patient. IV has been removed with tip in tact. Patient will be transported via ambulance.

## 2022-11-01 NOTE — PLAN OF CARE
Problem: Discharge Planning  Goal: Discharge to home or other facility with appropriate resources  Outcome: Progressing  Flowsheets (Taken 11/1/2022 7552)  Discharge to home or other facility with appropriate resources: Identify barriers to discharge with patient and caregiver     Problem: Pain  Goal: Verbalizes/displays adequate comfort level or baseline comfort level  Outcome: Progressing     Problem: Chronic Conditions and Co-morbidities  Goal: Patient's chronic conditions and co-morbidity symptoms are monitored and maintained or improved  Outcome: Progressing  Flowsheets (Taken 11/1/2022 0828)  Care Plan - Patient's Chronic Conditions and Co-Morbidity Symptoms are Monitored and Maintained or Improved: Monitor and assess patient's chronic conditions and comorbid symptoms for stability, deterioration, or improvement     Problem: Safety - Adult  Goal: Free from fall injury  Outcome: Progressing  Flowsheets (Taken 11/1/2022 0828)  Free From Fall Injury: Instruct family/caregiver on patient safety     Problem: Skin/Tissue Integrity  Goal: Absence of new skin breakdown  Description: 1. Monitor for areas of redness and/or skin breakdown  2. Assess vascular access sites hourly  3. Every 4-6 hours minimum:  Change oxygen saturation probe site  4. Every 4-6 hours:  If on nasal continuous positive airway pressure, respiratory therapy assess nares and determine need for appliance change or resting period.   Outcome: Progressing     Problem: Nutrition Deficit:  Goal: Optimize nutritional status  Outcome: Progressing

## 2022-11-01 NOTE — CARE COORDINATION
Social Work/Case Management Transition of Care Planning [place] [phone]): Level of care results received. Phone call to Ghada Villa of [facility]. She requested level of care results and PAS/RR be faxed to them. Requested information faxed. Transport arranged with [facility] ambulette with a 3:30 pm . Notified Officer Jessi Livingston, patient's parole office, of discharge plan. Covid test will be needed. Notified patient, floor nurse and Elsy Ren of  time.   JOURDAN Boateng  11/1/2022

## (undated) DEVICE — ADHESIVE SKIN CLSR 0.7ML TOP DERMBND ADV

## (undated) DEVICE — 4-PORT MANIFOLD: Brand: NEPTUNE 2

## (undated) DEVICE — SPONGE GZ W4XL4IN RAYON POLY FILL CVR W/ NONWOVEN FAB

## (undated) DEVICE — SYNCHROSEAL: Brand: DA VINCI ENERGY

## (undated) DEVICE — CONNECTOR IRRIGATION AUXILIARY H2O JET W/ PRT MTL THRD HYDR

## (undated) DEVICE — PUMP SUC IRR TBNG L10FT W/ HNDPC ASSEMB STRYKEFLOW 2

## (undated) DEVICE — AGENT HEMSTAT 5GM ARISTA AH

## (undated) DEVICE — GOWN,SIRUS,FABRNF,XL,20/CS: Brand: MEDLINE

## (undated) DEVICE — ELECTRODE PT RET AD L9FT HI MOIST COND ADH HYDRGEL CORDED

## (undated) DEVICE — BITEBLOCK 54FR W/ DENT RIM BLOX

## (undated) DEVICE — Z DISCONTINUED NO SUB IDED TUBING ETCO2 AD L6.5FT NSL ORAL CVD PRNG NONFLARED TIP OVR

## (undated) DEVICE — GLOVE ORANGE PI 7   MSG9070

## (undated) DEVICE — NEEDLE CLOSURE OMNICLOSE

## (undated) DEVICE — CANNULA SEAL

## (undated) DEVICE — SUCTION IRRIGATOR: Brand: ENDOWRIST

## (undated) DEVICE — GARMENT,MEDLINE,DVT,INT,CALF,MED, GEN2: Brand: MEDLINE

## (undated) DEVICE — [HIGH FLOW INSUFFLATOR,  DO NOT USE IF PACKAGE IS DAMAGED,  KEEP DRY,  KEEP AWAY FROM SUNLIGHT,  PROTECT FROM HEAT AND RADIOACTIVE SOURCES.]: Brand: PNEUMOSURE

## (undated) DEVICE — STANDARD HYPODERMIC NEEDLE,POLYPROPYLENE HUB: Brand: MONOJECT

## (undated) DEVICE — TOTAL TRAY, DB, 100% SILI FOLEY, 16FR 10: Brand: MEDLINE

## (undated) DEVICE — DEFENDO AIR WATER SUCTION AND BIOPSY VALVE KIT FOR  OLYMPUS: Brand: DEFENDO AIR/WATER/SUCTION AND BIOPSY VALVE

## (undated) DEVICE — TUBING STRL INST 6INX656FT

## (undated) DEVICE — KIT,ANTI FOG,W/SPONGE & FLUID,SOFT PACK: Brand: MEDLINE

## (undated) DEVICE — TIBURON GENERAL ENDOSCOPY DRAPE: Brand: CONVERTORS

## (undated) DEVICE — ARM DRAPE

## (undated) DEVICE — TIP COVER ACCESSORY

## (undated) DEVICE — ROBOTIC: Brand: MEDLINE INDUSTRIES, INC.

## (undated) DEVICE — LAPAROSCOPIC SCISSORS: Brand: EPIX LAPAROSCOPIC SCISSORS

## (undated) DEVICE — INSUFFLATION NEEDLE TO ESTABLISH PNEUMOPERITONEUM.: Brand: INSUFFLATION NEEDLE

## (undated) DEVICE — TUBE,GASTROSTOMY,22FR,3-PORT,WHITE,1/CS: Brand: MEDLINE

## (undated) DEVICE — TROCAR: Brand: KII FIOS FIRST ENTRY

## (undated) DEVICE — COLUMN DRAPE

## (undated) DEVICE — BLADELESS OBTURATOR: Brand: WECK VISTA